# Patient Record
Sex: FEMALE | Race: WHITE | NOT HISPANIC OR LATINO
[De-identification: names, ages, dates, MRNs, and addresses within clinical notes are randomized per-mention and may not be internally consistent; named-entity substitution may affect disease eponyms.]

---

## 2019-10-03 PROBLEM — Z00.00 ENCOUNTER FOR PREVENTIVE HEALTH EXAMINATION: Status: ACTIVE | Noted: 2019-10-03

## 2019-10-09 PROBLEM — M54.16 LUMBAR RADICULOPATHY: Status: RESOLVED | Noted: 2019-10-09 | Resolved: 2019-10-09

## 2019-10-09 PROBLEM — M32.9 HISTORY OF SYSTEMIC LUPUS ERYTHEMATOSUS (SLE): Status: RESOLVED | Noted: 2019-10-09 | Resolved: 2019-10-09

## 2019-10-09 PROBLEM — Z87.891 FORMER SMOKER: Status: ACTIVE | Noted: 2019-10-09

## 2019-10-09 PROBLEM — Z71.9 ENCOUNTER FOR CONSULTATION: Status: ACTIVE | Noted: 2019-10-09

## 2019-10-09 PROBLEM — Z87.39 HISTORY OF SPINAL STENOSIS: Status: RESOLVED | Noted: 2019-10-09 | Resolved: 2019-10-09

## 2019-10-09 PROBLEM — Z86.39 HISTORY OF HYPOTHYROIDISM: Status: RESOLVED | Noted: 2019-10-09 | Resolved: 2019-10-09

## 2019-10-09 RX ORDER — FENTANYL 75 UG/H
75 PATCH, EXTENDED RELEASE TRANSDERMAL
Refills: 0 | Status: ACTIVE | COMMUNITY

## 2019-10-09 RX ORDER — MELOXICAM 15 MG/1
15 TABLET ORAL
Refills: 0 | Status: ACTIVE | COMMUNITY

## 2019-10-09 RX ORDER — POTASSIUM CHLORIDE 1500 MG/1
20 TABLET, FILM COATED, EXTENDED RELEASE ORAL
Refills: 0 | Status: ACTIVE | COMMUNITY

## 2019-10-09 RX ORDER — GABAPENTIN 300 MG/1
300 CAPSULE ORAL
Refills: 0 | Status: ACTIVE | COMMUNITY

## 2019-10-09 RX ORDER — ATORVASTATIN CALCIUM 40 MG/1
40 TABLET, FILM COATED ORAL
Refills: 0 | Status: ACTIVE | COMMUNITY

## 2019-10-09 RX ORDER — CYCLOBENZAPRINE HYDROCHLORIDE 10 MG/1
10 TABLET, FILM COATED ORAL
Refills: 0 | Status: ACTIVE | COMMUNITY

## 2019-10-09 RX ORDER — FUROSEMIDE 20 MG/1
20 TABLET ORAL
Refills: 0 | Status: ACTIVE | COMMUNITY

## 2019-10-09 RX ORDER — PSYLLIUM HUSK 0.4 G
25 MCG CAPSULE ORAL
Refills: 0 | Status: ACTIVE | COMMUNITY

## 2019-10-09 RX ORDER — VERAPAMIL HYDROCHLORIDE 240 MG/1
240 TABLET ORAL
Refills: 0 | Status: ACTIVE | COMMUNITY

## 2019-10-09 RX ORDER — DULOXETINE HYDROCHLORIDE 60 MG/1
60 CAPSULE, DELAYED RELEASE PELLETS ORAL
Refills: 0 | Status: ACTIVE | COMMUNITY

## 2019-10-09 RX ORDER — LEVOTHYROXINE SODIUM 0.15 MG/1
150 TABLET ORAL
Refills: 0 | Status: ACTIVE | COMMUNITY

## 2019-10-09 RX ORDER — LOSARTAN POTASSIUM 100 MG/1
100 TABLET, FILM COATED ORAL
Refills: 0 | Status: ACTIVE | COMMUNITY

## 2019-10-09 RX ORDER — HYDRALAZINE HYDROCHLORIDE 100 MG/1
100 TABLET ORAL
Refills: 0 | Status: ACTIVE | COMMUNITY

## 2019-10-10 ENCOUNTER — APPOINTMENT (OUTPATIENT)
Dept: CARDIOTHORACIC SURGERY | Facility: CLINIC | Age: 69
End: 2019-10-10
Payer: MEDICARE

## 2019-10-10 VITALS
TEMPERATURE: 97.9 F | SYSTOLIC BLOOD PRESSURE: 120 MMHG | DIASTOLIC BLOOD PRESSURE: 84 MMHG | OXYGEN SATURATION: 99 % | HEART RATE: 60 BPM | RESPIRATION RATE: 12 BRPM

## 2019-10-10 DIAGNOSIS — Z87.39 PERSONAL HISTORY OF OTHER DISEASES OF THE MUSCULOSKELETAL SYSTEM AND CONNECTIVE TISSUE: ICD-10-CM

## 2019-10-10 DIAGNOSIS — M32.9 SYSTEMIC LUPUS ERYTHEMATOSUS, UNSPECIFIED: ICD-10-CM

## 2019-10-10 DIAGNOSIS — M54.16 RADICULOPATHY, LUMBAR REGION: ICD-10-CM

## 2019-10-10 DIAGNOSIS — Z78.9 OTHER SPECIFIED HEALTH STATUS: ICD-10-CM

## 2019-10-10 DIAGNOSIS — Z87.891 PERSONAL HISTORY OF NICOTINE DEPENDENCE: ICD-10-CM

## 2019-10-10 DIAGNOSIS — Z71.9 COUNSELING, UNSPECIFIED: ICD-10-CM

## 2019-10-10 DIAGNOSIS — Z86.39 PERSONAL HISTORY OF OTHER ENDOCRINE, NUTRITIONAL AND METABOLIC DISEASE: ICD-10-CM

## 2019-10-10 PROCEDURE — 99204 OFFICE O/P NEW MOD 45 MIN: CPT

## 2019-10-10 RX ORDER — VERAPAMIL HYDROCHLORIDE 240 MG/1
240 CAPSULE, DELAYED RELEASE PELLETS ORAL
Qty: 90 | Refills: 0 | Status: DISCONTINUED | COMMUNITY
Start: 2019-07-15

## 2019-10-11 ENCOUNTER — APPOINTMENT (OUTPATIENT)
Dept: CARDIOTHORACIC SURGERY | Facility: CLINIC | Age: 69
End: 2019-10-11

## 2019-10-11 VITALS — WEIGHT: 198 LBS | HEIGHT: 66 IN | BODY MASS INDEX: 31.82 KG/M2

## 2019-10-11 NOTE — HISTORY OF PRESENT ILLNESS
[FreeTextEntry1] : Gia is a 69 year old female former smoker with PMH of spinal stenosis, osteoarthritis in right knee and bilateral hips, HTN, HLD, hypothyroidism, RA, SLE, moderate AS and dilatation of ascending aorta (5.1 cm) on recent imaging. Recent TTE on 9/24/2019 demonstrated normal global and regional LV systolic function, EF 65%, aortic valve is calcified, moderate AS, mGr 32.5 mmHg, pGr 53.9 mmHg, AoV 3.7 m/s, mild MR, mild TR and mild pulmonary HTN. She was referred for surgical consultation. She denies angina, palpitations, back pain, dizziness or syncope. She reports GONZALEZ and describes "I feel like I cant get enough air in my lung." She also reports pedal edema and she titrates her Lasix dose accordingly.

## 2019-10-11 NOTE — PHYSICAL EXAM
[General Appearance - Alert] : alert [PERRL With Normal Accommodation] : pupils were equal in size, round, and reactive to light [Sclera] : the sclera and conjunctiva were normal [General Appearance - In No Acute Distress] : in no acute distress [Extraocular Movements] : extraocular movements were intact [Jugular Venous Distention Increased] : there was no jugular-venous distention [Outer Ear] : the ears and nose were normal in appearance [] : no respiratory distress [Respiration, Rhythm And Depth] : normal respiratory rhythm and effort [Chest Visual Inspection Thoracic Asymmetry] : no chest asymmetry [Examination Of The Chest] : the chest was normal in appearance [Heart Rate And Rhythm] : heart rate was normal and rhythm regular [Bowel Sounds] : normal bowel sounds [Breast Appearance] : normal in appearance [Breast Palpation Mass] : no palpable masses [Abdomen Soft] : soft [Cervical Lymph Nodes Enlarged Posterior Bilaterally] : posterior cervical [No CVA Tenderness] : no ~M costovertebral angle tenderness [Cervical Lymph Nodes Enlarged Anterior Bilaterally] : anterior cervical [Skin Turgor] : normal skin turgor [Involuntary Movements] : no involuntary movements were seen [Skin Color & Pigmentation] : normal skin color and pigmentation [Oriented To Time, Place, And Person] : oriented to person, place, and time [Impaired Insight] : insight and judgment were intact [No Focal Deficits] : no focal deficits [FreeTextEntry1] : deferred

## 2019-10-11 NOTE — REVIEW OF SYSTEMS
[Feeling Tired] : feeling tired [Negative] : ENT [Chest Pain] : no chest pain [Palpitations] : no palpitations

## 2019-10-11 NOTE — CONSULT LETTER
[Dear  ___] : Dear ~YOLIE, [Please see my note below.] : Please see my note below. [Courtesy Letter:] : I had the pleasure of seeing your patient, [unfilled], in my office today. [Consult Closing:] : Thank you very much for allowing me to participate in the care of this patient.  If you have any questions, please do not hesitate to contact me. [Sincerely,] : Sincerely, [FreeTextEntry2] : Dr. Chetan Montana\par [address]\par [place] [id number] [FreeTextEntry3] : Werner Alvares MD\par  & \par \par Cardiovascular & Thoracic Surgery\par [facility] \par [address]\par [facility] [address]\par

## 2019-10-11 NOTE — REASON FOR VISIT
[Initial Evaluation] : an initial evaluation [FreeTextEntry1] : moderate to severe AS ascending aortic dilatation

## 2019-10-11 NOTE — DATA REVIEWED
[FreeTextEntry1] : TTE on 9/24/2019 demonstrated\par Normal global and regional LV systolic function, EF 65%\par Aortic valve is calcified, Moderate AS, mGr 32.5mmHg, pGr 53.9 mmHg, AoV 3.7 m/s\par mild MR, mild TR\par mild pulmonary HTN \par \par CTA of chest  9/27/2019 demonstrated\par dilatation of the ascending aorta with a max. diameter of 5.1 cm\par Aortic root arch and descending thoracic aorta are normal in size.\par Normal aortic arch branch vessels are present

## 2019-10-11 NOTE — ASSESSMENT
[FreeTextEntry1] : Gia is a 69 year old female with PMH of HTN, HLD, hypothyroidism, RA, SLE, moderate AS and dilatation of ascending aorta (5.1 cm) on recent imaging. She was referred for surgical consultation. \par \par Cardiac imaging, medical records and reports reviewed at length with patient. Recent transthoracic echocardiogram demonstrated normal global and regional LV systolic function, EF 65%, aortic valve is calcified, moderate AS, mGr 32.5 mmHg, pGr 53.9 mmHg, AoV 3.7 m/s, mild MR, mild TR and mild pulmonary HTN.  CTA of chest demonstrated 5 cm ascending aortic aneurysm. Risks, benefits and alternatives to surgery discussed. Risks included but not limited to bleeding, stroke, myocardial Infarction, kidney problems, blood transfusion, permanent pacemaker implantation, infections and death. Operative mortality and complication risks are low. Various approaches discussed in detail. I would recommend an aortic valve replacement and ascending aorta replacement.  All questions have been fully answered and concerns addressed. Patient would like to proceed with surgery.\par \par Plan:\par 1) Cardiac Catherization (RIGHT AND LEFT)\par 2) Complete antibiotic therapy as per \par 3) Complete dental work before proceeding with surgery \par

## 2019-11-05 ENCOUNTER — OUTPATIENT (OUTPATIENT)
Dept: OUTPATIENT SERVICES | Facility: HOSPITAL | Age: 69
LOS: 1 days | End: 2019-11-05
Payer: MEDICARE

## 2019-11-05 VITALS
SYSTOLIC BLOOD PRESSURE: 169 MMHG | DIASTOLIC BLOOD PRESSURE: 86 MMHG | RESPIRATION RATE: 17 BRPM | WEIGHT: 190.92 LBS | TEMPERATURE: 98 F | HEART RATE: 63 BPM | OXYGEN SATURATION: 96 % | HEIGHT: 70 IN

## 2019-11-05 DIAGNOSIS — I35.0 NONRHEUMATIC AORTIC (VALVE) STENOSIS: ICD-10-CM

## 2019-11-05 DIAGNOSIS — I77.810 THORACIC AORTIC ECTASIA: ICD-10-CM

## 2019-11-05 DIAGNOSIS — Z98.49 CATARACT EXTRACTION STATUS, UNSPECIFIED EYE: Chronic | ICD-10-CM

## 2019-11-05 DIAGNOSIS — Z98.890 OTHER SPECIFIED POSTPROCEDURAL STATES: Chronic | ICD-10-CM

## 2019-11-05 DIAGNOSIS — Z90.710 ACQUIRED ABSENCE OF BOTH CERVIX AND UTERUS: Chronic | ICD-10-CM

## 2019-11-05 DIAGNOSIS — Z98.51 TUBAL LIGATION STATUS: Chronic | ICD-10-CM

## 2019-11-05 DIAGNOSIS — Z98.891 HISTORY OF UTERINE SCAR FROM PREVIOUS SURGERY: Chronic | ICD-10-CM

## 2019-11-05 LAB
ALBUMIN SERPL ELPH-MCNC: 4.4 G/DL — SIGNIFICANT CHANGE UP (ref 3.3–5)
ALP SERPL-CCNC: 81 U/L — SIGNIFICANT CHANGE UP (ref 40–120)
ALT FLD-CCNC: 23 U/L — SIGNIFICANT CHANGE UP (ref 10–45)
ANION GAP SERPL CALC-SCNC: 10 MMOL/L — SIGNIFICANT CHANGE UP (ref 5–17)
AST SERPL-CCNC: 81 U/L — HIGH (ref 10–40)
BILIRUB SERPL-MCNC: 0.4 MG/DL — SIGNIFICANT CHANGE UP (ref 0.2–1.2)
BUN SERPL-MCNC: 13 MG/DL — SIGNIFICANT CHANGE UP (ref 7–23)
CALCIUM SERPL-MCNC: 9.5 MG/DL — SIGNIFICANT CHANGE UP (ref 8.4–10.5)
CHLORIDE SERPL-SCNC: 102 MMOL/L — SIGNIFICANT CHANGE UP (ref 96–108)
CO2 SERPL-SCNC: 23 MMOL/L — SIGNIFICANT CHANGE UP (ref 22–31)
CREAT SERPL-MCNC: 0.61 MG/DL — SIGNIFICANT CHANGE UP (ref 0.5–1.3)
GLUCOSE SERPL-MCNC: 101 MG/DL — HIGH (ref 70–99)
HCT VFR BLD CALC: 39.4 % — SIGNIFICANT CHANGE UP (ref 34.5–45)
HCT VFR BLD CALC: 40.2 % — SIGNIFICANT CHANGE UP (ref 34.5–45)
HGB BLD-MCNC: 13 G/DL — SIGNIFICANT CHANGE UP (ref 11.5–15.5)
HGB BLD-MCNC: 13.5 G/DL — SIGNIFICANT CHANGE UP (ref 11.5–15.5)
MCHC RBC-ENTMCNC: 31.2 PG — SIGNIFICANT CHANGE UP (ref 27–34)
MCHC RBC-ENTMCNC: 31.8 PG — SIGNIFICANT CHANGE UP (ref 27–34)
MCHC RBC-ENTMCNC: 33 GM/DL — SIGNIFICANT CHANGE UP (ref 32–36)
MCHC RBC-ENTMCNC: 33.6 GM/DL — SIGNIFICANT CHANGE UP (ref 32–36)
MCV RBC AUTO: 94.5 FL — SIGNIFICANT CHANGE UP (ref 80–100)
MCV RBC AUTO: 94.6 FL — SIGNIFICANT CHANGE UP (ref 80–100)
NRBC # BLD: 0 /100 WBCS — SIGNIFICANT CHANGE UP (ref 0–0)
NRBC # BLD: 0 /100 WBCS — SIGNIFICANT CHANGE UP (ref 0–0)
PLATELET # BLD AUTO: 277 K/UL — SIGNIFICANT CHANGE UP (ref 150–400)
PLATELET # BLD AUTO: 288 K/UL — SIGNIFICANT CHANGE UP (ref 150–400)
POTASSIUM SERPL-MCNC: 3.7 MMOL/L — SIGNIFICANT CHANGE UP (ref 3.5–5.3)
POTASSIUM SERPL-MCNC: 7.6 MMOL/L — CRITICAL HIGH (ref 3.5–5.3)
POTASSIUM SERPL-SCNC: 3.7 MMOL/L — SIGNIFICANT CHANGE UP (ref 3.5–5.3)
POTASSIUM SERPL-SCNC: 7.6 MMOL/L — CRITICAL HIGH (ref 3.5–5.3)
PROT SERPL-MCNC: 8.1 G/DL — SIGNIFICANT CHANGE UP (ref 6–8.3)
RBC # BLD: 4.17 M/UL — SIGNIFICANT CHANGE UP (ref 3.8–5.2)
RBC # BLD: 4.25 M/UL — SIGNIFICANT CHANGE UP (ref 3.8–5.2)
RBC # FLD: 12.8 % — SIGNIFICANT CHANGE UP (ref 10.3–14.5)
RBC # FLD: 12.8 % — SIGNIFICANT CHANGE UP (ref 10.3–14.5)
SODIUM SERPL-SCNC: 135 MMOL/L — SIGNIFICANT CHANGE UP (ref 135–145)
WBC # BLD: 7.4 K/UL — SIGNIFICANT CHANGE UP (ref 3.8–10.5)
WBC # BLD: 8.08 K/UL — SIGNIFICANT CHANGE UP (ref 3.8–10.5)
WBC # FLD AUTO: 7.4 K/UL — SIGNIFICANT CHANGE UP (ref 3.8–10.5)
WBC # FLD AUTO: 8.08 K/UL — SIGNIFICANT CHANGE UP (ref 3.8–10.5)

## 2019-11-05 PROCEDURE — 84132 ASSAY OF SERUM POTASSIUM: CPT

## 2019-11-05 PROCEDURE — C1894: CPT

## 2019-11-05 PROCEDURE — C1769: CPT

## 2019-11-05 PROCEDURE — 80053 COMPREHEN METABOLIC PANEL: CPT

## 2019-11-05 PROCEDURE — 93010 ELECTROCARDIOGRAM REPORT: CPT

## 2019-11-05 PROCEDURE — 99152 MOD SED SAME PHYS/QHP 5/>YRS: CPT

## 2019-11-05 PROCEDURE — C1887: CPT

## 2019-11-05 PROCEDURE — 93456 R HRT CORONARY ARTERY ANGIO: CPT

## 2019-11-05 PROCEDURE — 93456 R HRT CORONARY ARTERY ANGIO: CPT | Mod: 26

## 2019-11-05 PROCEDURE — 93005 ELECTROCARDIOGRAM TRACING: CPT

## 2019-11-05 PROCEDURE — 85027 COMPLETE CBC AUTOMATED: CPT

## 2019-11-05 RX ORDER — SODIUM CHLORIDE 9 MG/ML
3 INJECTION INTRAMUSCULAR; INTRAVENOUS; SUBCUTANEOUS EVERY 8 HOURS
Refills: 0 | Status: DISCONTINUED | OUTPATIENT
Start: 2019-11-05 | End: 2019-11-22

## 2019-11-05 RX ORDER — HYDRALAZINE HCL 50 MG
100 TABLET ORAL ONCE
Refills: 0 | Status: DISCONTINUED | OUTPATIENT
Start: 2019-11-05 | End: 2019-11-22

## 2019-11-05 RX ORDER — OXYCODONE AND ACETAMINOPHEN 5; 325 MG/1; MG/1
2 TABLET ORAL ONCE
Refills: 0 | Status: COMPLETED | OUTPATIENT
Start: 2019-11-05 | End: 2019-11-05

## 2019-11-05 NOTE — H&P CARDIOLOGY - FAMILY HISTORY
Mother  Still living? No  Family history of pancreatic cancer, Age at diagnosis: Age Unknown     Grandparent  Still living? No  Family history of stomach cancer, Age at diagnosis: Age Unknown  Family history of aneurysm, Age at diagnosis: Age Unknown     Father  Still living? No  Family history of aneurysm, Age at diagnosis: Age Unknown

## 2019-11-05 NOTE — H&P CARDIOLOGY - PMH
Bipolar depression    Cataract    Fibromyalgia    Hip dislocation, left  avascular necrosis  HLD (hyperlipidemia)    HTN (hypertension)    Hypothyroid    Spinal stenosis of lumbar region    Uterine cancer

## 2019-11-05 NOTE — H&P CARDIOLOGY - HISTORY OF PRESENT ILLNESS
68 yo no implantable device with PMHx of HTN, HLD, spinal stenosis, bipolar depression, fibromyalgia, former smoker, hypothyroidism, avascular hip necrosis with left hip dislocation presents for cardiac cath. pt reports she has been feeling SOB, dry cough, difficulty of swallowing and hoarseness for few years, and had recent dental abscess which lead to further investigation found to have Aortic aneurysm.   cardiologist Dr. Cristobal ([facility]) 68 yo no implantable device with PMHx of HTN, HLD, spinal stenosis, bipolar depression, fibromyalgia, former smoker, hypothyroidism, osteoarthritis with right knee and bilateral hips (avascular hip necrosis with left hip dislocation), RA, SLE, heart murmur (mod AS) presents for cardiac cath. pt reports she has been feeling SOB, dry cough, difficulty of swallowing and hoarseness for few years, Echo showed moderate AS and dilatation of ascending aorta (5.1 cm) on recent imaging. Recent TTE on 9/24/2019 showed EF 65%, aortic valve is calcified, moderate AS, mGr 32.5 mmHg, pGr 53.9 mmHg, AoV 3.7 m/s, mild MR, mild TR and mild pulmonary HTN. She was referred for surgical consultation. She denies angina, palpitations, back pain, dizziness or syncope. She reports GONZALEZ.     cardiologist Dr. Cristobal ([place])

## 2019-11-05 NOTE — CHART NOTE - NSCHARTNOTEFT_GEN_A_CORE
Called by pt's spouse "bleeding."   Rt Groin bleeding noted after pt stood up pulled her pants.   5x5 cm sized hematoma on the puncture site manually compressed over 20 minutes.   VSS, no c/o dizziness or light headed.   CBC stable, no further hematoma noted.   Plan BR another hour, monitor   Slowly ambulate pt and discharge if stable. Called by pt's spouse "bleeding."   Rt Groin bleeding noted after pt stood up pulled her pants.   5x5 cm sized hematoma on the puncture site manually compressed over 20 minutes.   VSS, no c/o dizziness or light headed.   CBC stable, no further hematoma noted.   Plan BR another hour, monitor   Slowly ambulate pt and discharge if stable.      2 hours after above event, pt's c/o pain on right groin, site mild tender.   Recompressed again for 10-15 minutes.   /90, Hydralazine 100mg po & Ativan 2mg po (pt's home mid day dose) are given.    1720 pt is slowly sitting up in bed, VSS. site mild sore, no bleeding or hematoma. Called by pt's spouse "bleeding."   Rt Groin bleeding noted after pt stood up pulled her pants.   5x5 cm sized hematoma on the puncture site manually compressed over 20 minutes.   VSS, no c/o dizziness or light headed.   CBC stable, no further hematoma noted.   Plan BR another hour, monitor   Slowly ambulate pt and discharge if stable.      15:30   2 hours after above event, pt's c/o pain on right groin, site mild tender.   Recompressed again for 10-15 minutes.   /90, Hydralazine 100mg po & Ativan 2mg po (pt's home mid day dose) are given.    17:20   pt is slowly sitting up in bed, VSS. site mild sore, no bleeding or hematoma.

## 2019-11-05 NOTE — H&P CARDIOLOGY - PSH
H/O total hysterectomy    H/O tubal ligation    H/O:   x2  History of cataract surgery    History of hip surgery

## 2019-12-04 PROBLEM — S73.005A UNSPECIFIED DISLOCATION OF LEFT HIP, INITIAL ENCOUNTER: Chronic | Status: ACTIVE | Noted: 2019-11-05

## 2019-12-04 PROBLEM — I10 ESSENTIAL (PRIMARY) HYPERTENSION: Chronic | Status: ACTIVE | Noted: 2019-11-05

## 2019-12-04 PROBLEM — E03.9 HYPOTHYROIDISM, UNSPECIFIED: Chronic | Status: ACTIVE | Noted: 2019-11-05

## 2019-12-04 PROBLEM — F31.9 BIPOLAR DISORDER, UNSPECIFIED: Chronic | Status: ACTIVE | Noted: 2019-11-05

## 2019-12-04 PROBLEM — M48.061 SPINAL STENOSIS, LUMBAR REGION WITHOUT NEUROGENIC CLAUDICATION: Chronic | Status: ACTIVE | Noted: 2019-11-05

## 2019-12-04 PROBLEM — E78.5 HYPERLIPIDEMIA, UNSPECIFIED: Chronic | Status: ACTIVE | Noted: 2019-11-05

## 2019-12-04 PROBLEM — C55 MALIGNANT NEOPLASM OF UTERUS, PART UNSPECIFIED: Chronic | Status: ACTIVE | Noted: 2019-11-05

## 2019-12-04 PROBLEM — M79.7 FIBROMYALGIA: Chronic | Status: ACTIVE | Noted: 2019-11-05

## 2019-12-04 PROBLEM — H26.9 UNSPECIFIED CATARACT: Chronic | Status: ACTIVE | Noted: 2019-11-05

## 2020-01-15 PROBLEM — I77.810 ASCENDING AORTA DILATION: Status: ACTIVE | Noted: 2019-10-10

## 2020-01-15 PROBLEM — I35.0 AORTIC STENOSIS: Status: ACTIVE | Noted: 2019-10-09

## 2020-01-16 ENCOUNTER — APPOINTMENT (OUTPATIENT)
Dept: CARDIOTHORACIC SURGERY | Facility: CLINIC | Age: 70
End: 2020-01-16

## 2020-01-16 DIAGNOSIS — I77.810 THORACIC AORTIC ECTASIA: ICD-10-CM

## 2020-01-16 DIAGNOSIS — I35.0 NONRHEUMATIC AORTIC (VALVE) STENOSIS: ICD-10-CM

## 2020-01-21 ENCOUNTER — APPOINTMENT (OUTPATIENT)
Dept: CARDIOTHORACIC SURGERY | Facility: CLINIC | Age: 70
End: 2020-01-21

## 2020-01-23 VITALS — BODY MASS INDEX: 31.66 KG/M2 | WEIGHT: 197 LBS | HEIGHT: 66 IN

## 2020-01-23 VITALS — RESPIRATION RATE: 12 BRPM | SYSTOLIC BLOOD PRESSURE: 120 MMHG | TEMPERATURE: 98 F | DIASTOLIC BLOOD PRESSURE: 68 MMHG

## 2020-01-23 VITALS — OXYGEN SATURATION: 96 % | HEART RATE: 64 BPM | RESPIRATION RATE: 12 BRPM

## 2020-01-23 RX ORDER — LAMOTRIGINE 100 MG/1
100 TABLET ORAL DAILY
Refills: 0 | Status: ACTIVE | COMMUNITY

## 2020-01-23 RX ORDER — PROCHLORPERAZINE MALEATE 10 MG/1
10 TABLET ORAL DAILY
Refills: 0 | Status: ACTIVE | COMMUNITY

## 2020-01-23 RX ORDER — AMOXICILLIN AND CLAVULANATE POTASSIUM 875; 125 MG/1; MG/1
875-125 TABLET, COATED ORAL
Qty: 20 | Refills: 0 | Status: COMPLETED | COMMUNITY
Start: 2019-09-21 | End: 2020-01-23

## 2020-01-23 RX ORDER — LORAZEPAM 2 MG/1
2 TABLET ORAL 3 TIMES DAILY
Refills: 0 | Status: ACTIVE | COMMUNITY

## 2020-01-27 ENCOUNTER — APPOINTMENT (OUTPATIENT)
Dept: CARDIOTHORACIC SURGERY | Facility: CLINIC | Age: 70
End: 2020-01-27

## 2020-02-06 NOTE — HISTORY OF PRESENT ILLNESS
[FreeTextEntry1] : Gia is a 69 year old female former smoker with PMH of spinal stenosis, osteoarthritis in right knee and bilateral hips, HTN, HLD, hypothyroidism, RA, SLE, moderate AS and dilatation of ascending aorta (5.1 cm) on recent imaging. Recent TTE on 9/24/2019 demonstrated normal global and regional LV systolic function, EF 65%, aortic valve is calcified, moderate AS, mGr 32.5 mmHg, pGr 53.9 mmHg, AoV 3.7 m/s, mild MR, mild TR and mild pulmonary HTN. She was referred for surgical consultation. She denies angina, palpitations, back pain, dizziness or syncope. She reports GONZALEZ and describes "I feel like I cant get enough air in my lung." She also reports pedal edema and she titrates her Lasix dose accordingly. \par \par Surgery (AVR/ascending aortic replacement) booked for 12/5/2019 but postponed due to recent hip fracture. She returns today for followup and further discussion of surgical plan.

## 2020-02-06 NOTE — CONSULT LETTER
[Dear  ___] : Dear ~YOLIE, [Courtesy Letter:] : I had the pleasure of seeing your patient, [unfilled], in my office today. [Please see my note below.] : Please see my note below. [Consult Closing:] : Thank you very much for allowing me to participate in the care of this patient.  If you have any questions, please do not hesitate to contact me. [Sincerely,] : Sincerely, [FreeTextEntry2] : Dr. Chetan Montana\par [address]\par [place] [phone] [FreeTextEntry3] : Werner Alvares MD\par  & \par \par Cardiovascular & Thoracic Surgery\par [facility] \par [address]\par [facility] [address]\par

## 2020-02-12 ENCOUNTER — APPOINTMENT (OUTPATIENT)
Dept: CARDIOTHORACIC SURGERY | Facility: CLINIC | Age: 70
End: 2020-02-12

## 2020-02-13 ENCOUNTER — OUTPATIENT (OUTPATIENT)
Dept: OUTPATIENT SERVICES | Facility: HOSPITAL | Age: 70
LOS: 1 days | End: 2020-02-13
Payer: MEDICARE

## 2020-02-13 VITALS
RESPIRATION RATE: 16 BRPM | HEIGHT: 65.5 IN | SYSTOLIC BLOOD PRESSURE: 156 MMHG | OXYGEN SATURATION: 96 % | DIASTOLIC BLOOD PRESSURE: 86 MMHG | WEIGHT: 197.09 LBS | TEMPERATURE: 98 F | HEART RATE: 57 BPM

## 2020-02-13 DIAGNOSIS — F31.9 BIPOLAR DISORDER, UNSPECIFIED: ICD-10-CM

## 2020-02-13 DIAGNOSIS — Z98.890 OTHER SPECIFIED POSTPROCEDURAL STATES: Chronic | ICD-10-CM

## 2020-02-13 DIAGNOSIS — Z98.49 CATARACT EXTRACTION STATUS, UNSPECIFIED EYE: Chronic | ICD-10-CM

## 2020-02-13 DIAGNOSIS — R52 PAIN, UNSPECIFIED: ICD-10-CM

## 2020-02-13 DIAGNOSIS — I35.0 NONRHEUMATIC AORTIC (VALVE) STENOSIS: ICD-10-CM

## 2020-02-13 DIAGNOSIS — Z98.51 TUBAL LIGATION STATUS: Chronic | ICD-10-CM

## 2020-02-13 DIAGNOSIS — Z01.818 ENCOUNTER FOR OTHER PREPROCEDURAL EXAMINATION: ICD-10-CM

## 2020-02-13 DIAGNOSIS — I71.2 THORACIC AORTIC ANEURYSM, WITHOUT RUPTURE: ICD-10-CM

## 2020-02-13 DIAGNOSIS — I10 ESSENTIAL (PRIMARY) HYPERTENSION: ICD-10-CM

## 2020-02-13 DIAGNOSIS — Z29.9 ENCOUNTER FOR PROPHYLACTIC MEASURES, UNSPECIFIED: ICD-10-CM

## 2020-02-13 DIAGNOSIS — Z98.891 HISTORY OF UTERINE SCAR FROM PREVIOUS SURGERY: Chronic | ICD-10-CM

## 2020-02-13 DIAGNOSIS — Z90.710 ACQUIRED ABSENCE OF BOTH CERVIX AND UTERUS: Chronic | ICD-10-CM

## 2020-02-13 LAB
ANION GAP SERPL CALC-SCNC: 14 MMOL/L — SIGNIFICANT CHANGE UP (ref 5–17)
BLD GP AB SCN SERPL QL: NEGATIVE — SIGNIFICANT CHANGE UP
BUN SERPL-MCNC: 18 MG/DL — SIGNIFICANT CHANGE UP (ref 7–23)
CALCIUM SERPL-MCNC: 9.5 MG/DL — SIGNIFICANT CHANGE UP (ref 8.4–10.5)
CHLORIDE SERPL-SCNC: 104 MMOL/L — SIGNIFICANT CHANGE UP (ref 96–108)
CO2 SERPL-SCNC: 23 MMOL/L — SIGNIFICANT CHANGE UP (ref 22–31)
CREAT SERPL-MCNC: 0.66 MG/DL — SIGNIFICANT CHANGE UP (ref 0.5–1.3)
GLUCOSE SERPL-MCNC: 106 MG/DL — HIGH (ref 70–99)
HCT VFR BLD CALC: 39.5 % — SIGNIFICANT CHANGE UP (ref 34.5–45)
HGB BLD-MCNC: 12.5 G/DL — SIGNIFICANT CHANGE UP (ref 11.5–15.5)
MCHC RBC-ENTMCNC: 31.6 GM/DL — LOW (ref 32–36)
MCHC RBC-ENTMCNC: 31.8 PG — SIGNIFICANT CHANGE UP (ref 27–34)
MCV RBC AUTO: 100.5 FL — HIGH (ref 80–100)
NRBC # BLD: 0 /100 WBCS — SIGNIFICANT CHANGE UP (ref 0–0)
PLATELET # BLD AUTO: 278 K/UL — SIGNIFICANT CHANGE UP (ref 150–400)
POTASSIUM SERPL-MCNC: 4 MMOL/L — SIGNIFICANT CHANGE UP (ref 3.5–5.3)
POTASSIUM SERPL-SCNC: 4 MMOL/L — SIGNIFICANT CHANGE UP (ref 3.5–5.3)
RBC # BLD: 3.93 M/UL — SIGNIFICANT CHANGE UP (ref 3.8–5.2)
RBC # FLD: 14 % — SIGNIFICANT CHANGE UP (ref 10.3–14.5)
RH IG SCN BLD-IMP: POSITIVE — SIGNIFICANT CHANGE UP
SODIUM SERPL-SCNC: 141 MMOL/L — SIGNIFICANT CHANGE UP (ref 135–145)
WBC # BLD: 6.61 K/UL — SIGNIFICANT CHANGE UP (ref 3.8–10.5)
WBC # FLD AUTO: 6.61 K/UL — SIGNIFICANT CHANGE UP (ref 3.8–10.5)

## 2020-02-13 PROCEDURE — 71046 X-RAY EXAM CHEST 2 VIEWS: CPT | Mod: 26

## 2020-02-13 PROCEDURE — 87640 STAPH A DNA AMP PROBE: CPT

## 2020-02-13 PROCEDURE — 86850 RBC ANTIBODY SCREEN: CPT

## 2020-02-13 PROCEDURE — 83036 HEMOGLOBIN GLYCOSYLATED A1C: CPT

## 2020-02-13 PROCEDURE — 87641 MR-STAPH DNA AMP PROBE: CPT

## 2020-02-13 PROCEDURE — 80048 BASIC METABOLIC PNL TOTAL CA: CPT

## 2020-02-13 PROCEDURE — 86900 BLOOD TYPING SEROLOGIC ABO: CPT

## 2020-02-13 PROCEDURE — 86901 BLOOD TYPING SEROLOGIC RH(D): CPT

## 2020-02-13 PROCEDURE — G0463: CPT

## 2020-02-13 PROCEDURE — 71046 X-RAY EXAM CHEST 2 VIEWS: CPT

## 2020-02-13 PROCEDURE — 85027 COMPLETE CBC AUTOMATED: CPT

## 2020-02-13 RX ORDER — OMEPRAZOLE 10 MG/1
1 CAPSULE, DELAYED RELEASE ORAL
Qty: 0 | Refills: 0 | DISCHARGE

## 2020-02-13 RX ORDER — SODIUM CHLORIDE 9 MG/ML
3 INJECTION INTRAMUSCULAR; INTRAVENOUS; SUBCUTANEOUS EVERY 8 HOURS
Refills: 0 | Status: DISCONTINUED | OUTPATIENT
Start: 2020-02-19 | End: 2020-03-01

## 2020-02-13 NOTE — H&P PST ADULT - NSICDXPASTMEDICALHX_GEN_ALL_CORE_FT
PAST MEDICAL HISTORY:  Bipolar depression     Cataract     Fibromyalgia     Hip dislocation, left avascular necrosis    HLD (hyperlipidemia)     HTN (hypertension)     Hypothyroid     Spinal stenosis of lumbar region     Subacute systemic lupus erythematosus     Uterine cancer PAST MEDICAL HISTORY:  Bipolar depression     Cataract     Fibromyalgia     H/O aortic aneurysm and thoracic aortic aneurysm    Hip dislocation, left avascular necrosis    HLD (hyperlipidemia)     HTN (hypertension)     Hypothyroid     Moderate aortic stenosis     Spinal stenosis of lumbar region     Subacute systemic lupus erythematosus     Uterine cancer

## 2020-02-13 NOTE — H&P PST ADULT - ACTIVITY
Cane, walker with spinal stenosis, wheelchair presently Presently on wheelchair due to fractured left hip

## 2020-02-13 NOTE — H&P PST ADULT - HISTORY OF PRESENT ILLNESS
68 yo no implantable device with PMHx of HTN, HLD, spinal stenosis, bipolar depression, fibromyalgia, former smoker, hypothyroidism, osteoarthritis with right knee and bilateral hips (avascular hip necrosis with left hip dislocation), RA, SLE, heart murmur (mod AS) presents for cardiac cath. pt reports she has been feeling SOB, dry cough, difficulty of swallowing and hoarseness for few years, Echo showed moderate AS and dilatation of ascending aorta (5.1 cm) on recent imaging. Recent TTE on 9/24/2019 showed EF 65%, aortic valve is calcified, moderate AS, mGr 32.5 mmHg, pGr 53.9 mmHg, AoV 3.7 m/s, mild MR, mild TR and mild pulmonary HTN. She was referred for surgical consultation. She denies angina, palpitations, back pain, dizziness or syncope. She reports GONZALEZ.     cardiologist Dr. Cristobal ([place]) 70 yo female, PMH HTN, HLD, bipolar disorder, fibromyalgia, hypothyroid, chronic pain due to OA bilateral knees and hips, SLE, RA, known heart murmur due to moderate AS (follows up with cardiology), incidental finding dilation of ascending aorta 5.1cm on CT scan and aortic/thoracic aneurysm, had pre-op cardiac cath 11/5/19 (normal coronaries), pt. fell on left hip and suffered fracture, and surgery postponed. Pt. presents to PST today for scheduled Aortic Valve Replacement, Ascending Aortic Replacement on 2/20/20. Pt. was on antibiotics for a cough and taking her last dose today - no further cough, denies fever, chills, ches pain, SOB, changes in bowel/urinary habits.    Pt. will have her Fentanyl patch DOS - pt. was told to tell anesthesiologist that she is wearing it and it will have to be removed right before going to OR (d/w Dr. Castillo) 68 yo female, PMH HTN, HLD, bipolar disorder, fibromyalgia, hypothyroid, chronic pain due to OA bilateral knees and hips, SLE, RA, known heart murmur due to moderate AS (follows up with cardiology), incidental finding dilation of ascending aorta 5.1cm on CT scan and aortic/thoracic aneurysm, had pre-op cardiac cath 11/5/19 (normal coronaries), pt. fell on left hip and suffered fracture, and surgery postponed. Pt. presents to PST today for scheduled Aortic Valve Replacement, Ascending Aortic Replacement on 2/20/20. Pt. was on antibiotics for a cough and taking her last dose today - no further cough, denies fever, chills, ches pain, SOB, changes in bowel/urinary habits.    Pt. will have her Fentanyl patch DOS - pt. was told to tell anesthesiologist that she is wearing it and it will have to be removed right before going to OR (d/w Dr. Castillo) Pain management consult requested

## 2020-02-13 NOTE — H&P PST ADULT - OTHER CARE PROVIDERS
Dr. De, cardiologist ; Dr. Ba, pt. bairon Dr. De, cardiologist ; Dr. Ba, pain management, [phone]

## 2020-02-13 NOTE — H&P PST ADULT - NSICDXPASTSURGICALHX_GEN_ALL_CORE_FT
PAST SURGICAL HISTORY:  H/O total hysterectomy     H/O tubal ligation     H/O:  x2    History of cataract surgery     History of hip surgery

## 2020-02-13 NOTE — H&P PST ADULT - DOES THIS PATIENT HAVE A HISTORY OF OR HAS BEEN DX WITH HEART FAILURE?
Chief Complaint:  Ashok Bean is here today for follow up visit from ER - Constipation.    Medications:  medications verified and updated  Refills needed today?  NO  denies Latex allergy or sensitivity  Tobacco History:  verified               yes

## 2020-02-13 NOTE — H&P PST ADULT - ASSESSMENT
ELVIAI VTE 2.0 SCORE [CLOT updated 2019]    AGE RELATED RISK FACTORS                                                       MOBILITY RELATED FACTORS  [ ] Age 41-60 years                                            (1 Point)                    [ ] Bed rest                                                        (1 Point)  [x ] Age: 61-74 years                                           (2 Points)                  [ ] Plaster cast                                                   (2 Points)  [ ] Age= 75 years                                              (3 Points)                    [ ] Bed bound for more than 72 hours                 (2 Points)    DISEASE RELATED RISK FACTORS                                               GENDER SPECIFIC FACTORS  [ x] Edema in the lower extremities                       (1 Point)              [ ] Pregnancy                                                     (1 Point)  [ ] Varicose veins                                               (1 Point)                     [ ] Post-partum < 6 weeks                                   (1 Point)             [ x] BMI > 25 Kg/m2                                            (1 Point)                     [ ] Hormonal therapy  or oral contraception          (1 Point)                 [ ] Sepsis (in the previous month)                        (1 Point)               [ ] History of pregnancy complications                 (1 point)  [ ] Pneumonia or serious lung disease                                               [ ] Unexplained or recurrent                     (1 Point)           (in the previous month)                               (1 Point)  [ ] Abnormal pulmonary function test                     (1 Point)                 SURGERY RELATED RISK FACTORS  [ ] Acute myocardial infarction                              (1 Point)               [ ]  Section                                             (1 Point)  [ ] Congestive heart failure (in the previous month)  (1 Point)      [ ] Minor surgery                                                  (1 Point)   [ ] Inflammatory bowel disease                             (1 Point)               [ ] Arthroscopic surgery                                        (2 Points)  [ ] Central venous access                                      (2 Points)                [x ] General surgery lasting more than 45 minutes (2 points)  [x ] Malignancy- Present or previous                   (2 Points)                [ ] Elective arthroplasty                                         (5 points)    [ ] Stroke (in the previous month)                          (5 Points)                                                                                                                                                           HEMATOLOGY RELATED FACTORS                                                 TRAUMA RELATED RISK FACTORS  [ ] Prior episodes of VTE                                     (3 Points)                [ ] Fracture of the hip, pelvis, or leg                       (5 Points)  [ ] Positive family history for VTE                         (3 Points)             [ ] Acute spinal cord injury (in the previous month)  (5 Points)  [ ] Prothrombin 33893 A                                     (3 Points)               [ ] Paralysis  (less than 1 month)                             (5 Points)  [ ] Factor V Leiden                                             (3 Points)                  [ ] Multiple Trauma within 1 month                        (5 Points)  [ ] Lupus anticoagulants                                     (3 Points)                                                           [ ] Anticardiolipin antibodies                               (3 Points)                                                       [ ] High homocysteine in the blood                      (3 Points)                                             [ ] Other congenital or acquired thrombophilia      (3 Points)                                                [ ] Heparin induced thrombocytopenia                  (3 Points)                                     Total Score [8          ]

## 2020-02-13 NOTE — H&P PST ADULT - NSANTHOSAYNRD_GEN_A_CORE
No. SAYDA screening performed.  STOP BANG Legend: 0-2 = LOW Risk; 3-4 = INTERMEDIATE Risk; 5-8 = HIGH Risk

## 2020-02-13 NOTE — H&P PST ADULT - NSICDXFAMILYHX_GEN_ALL_CORE_FT
FAMILY HISTORY:  Father  Still living? No  Family history of aneurysm, Age at diagnosis: Age Unknown    Mother  Still living? No  Family history of pancreatic cancer, Age at diagnosis: Age Unknown    Grandparent  Still living? No  Family history of aneurysm, Age at diagnosis: Age Unknown  Family history of stomach cancer, Age at diagnosis: Age Unknown

## 2020-02-13 NOTE — H&P PST ADULT - NSICDXPROBLEM_GEN_ALL_CORE_FT
PROBLEM DIAGNOSES  Problem: Aortic valve stenosis  Assessment and Plan: Aortic Valve Replacement, Ascending Aortic Replacement  Pre-op instructions, including Chlorhexidine soap, provided - all questions answered    Problem: Bipolar disorder  Assessment and Plan: Continue pshych meds as directed    Problem: Chronic generalized pain  Assessment and Plan: Continue pain medications and muscle relaxers as indicated    Problem: Hypertension  Assessment and Plan: continue BP/cardiac meds as directed, including am of surgery with sip of water    Problem: Prophylactic measure  Assessment and Plan: The Caprini score indicates that this patient is at high risk for a VTE event (score 6 or greater). Surgical patients in this group will benefit from both pharmacologic prophylaxis and intermittent compression devices.  The surgical team will determine the balance between VTE risk and bleeding risk, and other clinical considerations

## 2020-02-14 DIAGNOSIS — Z22.322 CARRIER OR SUSPECTED CARRIER OF METHICILLIN RESISTANT STAPHYLOCOCCUS AUREUS: ICD-10-CM

## 2020-02-14 LAB
HBA1C BLD-MCNC: 5.5 % — SIGNIFICANT CHANGE UP (ref 4–5.6)
MRSA PCR RESULT.: DETECTED
S AUREUS DNA NOSE QL NAA+PROBE: DETECTED

## 2020-02-14 RX ORDER — MUPIROCIN 20 MG/G
2 OINTMENT TOPICAL
Qty: 1 | Refills: 0 | Status: ACTIVE | COMMUNITY
Start: 2020-02-14 | End: 1900-01-01

## 2020-02-19 ENCOUNTER — APPOINTMENT (OUTPATIENT)
Dept: CARDIOTHORACIC SURGERY | Facility: HOSPITAL | Age: 70
End: 2020-02-19

## 2020-02-19 ENCOUNTER — RESULT REVIEW (OUTPATIENT)
Age: 70
End: 2020-02-19

## 2020-02-19 ENCOUNTER — INPATIENT (INPATIENT)
Facility: HOSPITAL | Age: 70
LOS: 10 days | Discharge: HOME | DRG: 219 | End: 2020-03-01
Attending: THORACIC SURGERY (CARDIOTHORACIC VASCULAR SURGERY) | Admitting: THORACIC SURGERY (CARDIOTHORACIC VASCULAR SURGERY)
Payer: MEDICARE

## 2020-02-19 VITALS
OXYGEN SATURATION: 94 % | HEART RATE: 60 BPM | TEMPERATURE: 98 F | SYSTOLIC BLOOD PRESSURE: 139 MMHG | WEIGHT: 197.09 LBS | RESPIRATION RATE: 16 BRPM | HEIGHT: 65 IN | DIASTOLIC BLOOD PRESSURE: 85 MMHG

## 2020-02-19 DIAGNOSIS — I35.0 NONRHEUMATIC AORTIC (VALVE) STENOSIS: ICD-10-CM

## 2020-02-19 DIAGNOSIS — Z90.710 ACQUIRED ABSENCE OF BOTH CERVIX AND UTERUS: Chronic | ICD-10-CM

## 2020-02-19 DIAGNOSIS — Z98.51 TUBAL LIGATION STATUS: Chronic | ICD-10-CM

## 2020-02-19 DIAGNOSIS — I71.2 THORACIC AORTIC ANEURYSM, WITHOUT RUPTURE: ICD-10-CM

## 2020-02-19 DIAGNOSIS — Z98.49 CATARACT EXTRACTION STATUS, UNSPECIFIED EYE: Chronic | ICD-10-CM

## 2020-02-19 DIAGNOSIS — Z98.890 OTHER SPECIFIED POSTPROCEDURAL STATES: Chronic | ICD-10-CM

## 2020-02-19 DIAGNOSIS — Z98.891 HISTORY OF UTERINE SCAR FROM PREVIOUS SURGERY: Chronic | ICD-10-CM

## 2020-02-19 PROBLEM — Z86.79 PERSONAL HISTORY OF OTHER DISEASES OF THE CIRCULATORY SYSTEM: Chronic | Status: ACTIVE | Noted: 2020-02-13

## 2020-02-19 PROBLEM — L93.0 DISCOID LUPUS ERYTHEMATOSUS: Chronic | Status: ACTIVE | Noted: 2020-02-13

## 2020-02-19 LAB
ALBUMIN SERPL ELPH-MCNC: 2.8 G/DL — LOW (ref 3.3–5)
ALP SERPL-CCNC: 34 U/L — LOW (ref 40–120)
ALT FLD-CCNC: 8 U/L — LOW (ref 10–45)
ANION GAP SERPL CALC-SCNC: 18 MMOL/L — HIGH (ref 5–17)
APTT BLD: 29.7 SEC — SIGNIFICANT CHANGE UP (ref 27.5–36.3)
AST SERPL-CCNC: 20 U/L — SIGNIFICANT CHANGE UP (ref 10–40)
BASE EXCESS BLDV CALC-SCNC: -1.2 MMOL/L — SIGNIFICANT CHANGE UP (ref -2–2)
BASE EXCESS BLDV CALC-SCNC: -2.4 MMOL/L — LOW (ref -2–2)
BASE EXCESS BLDV CALC-SCNC: 1.8 MMOL/L — SIGNIFICANT CHANGE UP (ref -2–2)
BASE EXCESS BLDV CALC-SCNC: 2 MMOL/L — SIGNIFICANT CHANGE UP (ref -2–2)
BASE EXCESS BLDV CALC-SCNC: 2.7 MMOL/L — HIGH (ref -2–2)
BASE EXCESS BLDV CALC-SCNC: 2.8 MMOL/L — HIGH (ref -2–2)
BASOPHILS # BLD AUTO: 0.03 K/UL — SIGNIFICANT CHANGE UP (ref 0–0.2)
BASOPHILS NFR BLD AUTO: 0.3 % — SIGNIFICANT CHANGE UP (ref 0–2)
BILIRUB SERPL-MCNC: 0.5 MG/DL — SIGNIFICANT CHANGE UP (ref 0.2–1.2)
BLOOD GAS VENOUS - CREATININE: SIGNIFICANT CHANGE UP MG/DL (ref 0.5–1.3)
BUN SERPL-MCNC: 15 MG/DL — SIGNIFICANT CHANGE UP (ref 7–23)
CA-I SERPL-SCNC: 0.93 MMOL/L — LOW (ref 1.12–1.3)
CA-I SERPL-SCNC: 0.97 MMOL/L — LOW (ref 1.12–1.3)
CA-I SERPL-SCNC: 0.99 MMOL/L — LOW (ref 1.12–1.3)
CA-I SERPL-SCNC: 0.99 MMOL/L — LOW (ref 1.12–1.3)
CA-I SERPL-SCNC: 1 MMOL/L — LOW (ref 1.12–1.3)
CA-I SERPL-SCNC: 1.01 MMOL/L — LOW (ref 1.12–1.3)
CALCIUM SERPL-MCNC: 8.2 MG/DL — LOW (ref 8.4–10.5)
CHLORIDE BLDV-SCNC: SIGNIFICANT CHANGE UP MMOL/L (ref 96–108)
CHLORIDE SERPL-SCNC: 105 MMOL/L — SIGNIFICANT CHANGE UP (ref 96–108)
CK MB BLD-MCNC: 12.5 % — HIGH (ref 0–3.5)
CK MB CFR SERPL CALC: 21.7 NG/ML — HIGH (ref 0–3.8)
CK SERPL-CCNC: 174 U/L — HIGH (ref 25–170)
CO2 SERPL-SCNC: 20 MMOL/L — LOW (ref 22–31)
CREAT SERPL-MCNC: 0.46 MG/DL — LOW (ref 0.5–1.3)
EOSINOPHIL # BLD AUTO: 0.06 K/UL — SIGNIFICANT CHANGE UP (ref 0–0.5)
EOSINOPHIL NFR BLD AUTO: 0.5 % — SIGNIFICANT CHANGE UP (ref 0–6)
FIBRINOGEN PPP-MCNC: 289 MG/DL — LOW (ref 350–510)
GAS PNL BLDA: SIGNIFICANT CHANGE UP
GAS PNL BLDV: 139 MMOL/L — SIGNIFICANT CHANGE UP (ref 135–145)
GAS PNL BLDV: 140 MMOL/L — SIGNIFICANT CHANGE UP (ref 135–145)
GAS PNL BLDV: 140 MMOL/L — SIGNIFICANT CHANGE UP (ref 135–145)
GAS PNL BLDV: 141 MMOL/L — SIGNIFICANT CHANGE UP (ref 135–145)
GAS PNL BLDV: SIGNIFICANT CHANGE UP
GLUCOSE BLDV-MCNC: 113 MG/DL — HIGH (ref 70–99)
GLUCOSE BLDV-MCNC: 127 MG/DL — HIGH (ref 70–99)
GLUCOSE BLDV-MCNC: 139 MG/DL — HIGH (ref 70–99)
GLUCOSE BLDV-MCNC: 153 MG/DL — HIGH (ref 70–99)
GLUCOSE BLDV-MCNC: 162 MG/DL — HIGH (ref 70–99)
GLUCOSE BLDV-MCNC: 177 MG/DL — HIGH (ref 70–99)
GLUCOSE SERPL-MCNC: 126 MG/DL — HIGH (ref 70–99)
HCO3 BLDV-SCNC: 22 MMOL/L — SIGNIFICANT CHANGE UP (ref 21–29)
HCO3 BLDV-SCNC: 26 MMOL/L — SIGNIFICANT CHANGE UP (ref 21–29)
HCO3 BLDV-SCNC: 27 MMOL/L — SIGNIFICANT CHANGE UP (ref 21–29)
HCO3 BLDV-SCNC: 28 MMOL/L — SIGNIFICANT CHANGE UP (ref 21–29)
HCO3 BLDV-SCNC: 28 MMOL/L — SIGNIFICANT CHANGE UP (ref 21–29)
HCO3 BLDV-SCNC: 29 MMOL/L — SIGNIFICANT CHANGE UP (ref 21–29)
HCT VFR BLD CALC: 25.7 % — LOW (ref 34.5–45)
HCT VFR BLD CALC: 28.1 % — LOW (ref 34.5–45)
HCT VFR BLDA CALC: 24 % — LOW (ref 39–50)
HCT VFR BLDA CALC: 25 % — LOW (ref 39–50)
HCT VFR BLDA CALC: 28 % — LOW (ref 39–50)
HCT VFR BLDA CALC: 30 % — LOW (ref 39–50)
HGB BLD CALC-MCNC: 7.6 G/DL — LOW (ref 11.5–15.5)
HGB BLD CALC-MCNC: 8 G/DL — LOW (ref 11.5–15.5)
HGB BLD CALC-MCNC: 8.9 G/DL — LOW (ref 11.5–15.5)
HGB BLD CALC-MCNC: 9.1 G/DL — LOW (ref 11.5–15.5)
HGB BLD CALC-MCNC: 9.1 G/DL — LOW (ref 11.5–15.5)
HGB BLD CALC-MCNC: 9.7 G/DL — LOW (ref 11.5–15.5)
HGB BLD-MCNC: 8.5 G/DL — LOW (ref 11.5–15.5)
HGB BLD-MCNC: 9.1 G/DL — LOW (ref 11.5–15.5)
HOROWITZ INDEX BLDV+IHG-RTO: 0 — SIGNIFICANT CHANGE UP
IMM GRANULOCYTES NFR BLD AUTO: 1.2 % — SIGNIFICANT CHANGE UP (ref 0–1.5)
INR BLD: 1.07 RATIO — SIGNIFICANT CHANGE UP (ref 0.88–1.16)
LACTATE BLDV-MCNC: 0.8 MMOL/L — SIGNIFICANT CHANGE UP (ref 0.7–2)
LACTATE BLDV-MCNC: 0.9 MMOL/L — SIGNIFICANT CHANGE UP (ref 0.7–2)
LACTATE BLDV-MCNC: 1 MMOL/L — SIGNIFICANT CHANGE UP (ref 0.7–2)
LACTATE BLDV-MCNC: 1.4 MMOL/L — SIGNIFICANT CHANGE UP (ref 0.7–2)
LACTATE BLDV-MCNC: 1.6 MMOL/L — SIGNIFICANT CHANGE UP (ref 0.7–2)
LACTATE BLDV-MCNC: 2.4 MMOL/L — HIGH (ref 0.7–2)
LYMPHOCYTES # BLD AUTO: 1.1 K/UL — SIGNIFICANT CHANGE UP (ref 1–3.3)
LYMPHOCYTES # BLD AUTO: 9.7 % — LOW (ref 13–44)
MAGNESIUM SERPL-MCNC: 2.8 MG/DL — HIGH (ref 1.6–2.6)
MCHC RBC-ENTMCNC: 31.8 PG — SIGNIFICANT CHANGE UP (ref 27–34)
MCHC RBC-ENTMCNC: 32 PG — SIGNIFICANT CHANGE UP (ref 27–34)
MCHC RBC-ENTMCNC: 32.4 GM/DL — SIGNIFICANT CHANGE UP (ref 32–36)
MCHC RBC-ENTMCNC: 33.1 GM/DL — SIGNIFICANT CHANGE UP (ref 32–36)
MCV RBC AUTO: 96.6 FL — SIGNIFICANT CHANGE UP (ref 80–100)
MCV RBC AUTO: 98.3 FL — SIGNIFICANT CHANGE UP (ref 80–100)
MONOCYTES # BLD AUTO: 0.62 K/UL — SIGNIFICANT CHANGE UP (ref 0–0.9)
MONOCYTES NFR BLD AUTO: 5.5 % — SIGNIFICANT CHANGE UP (ref 2–14)
NEUTROPHILS # BLD AUTO: 9.37 K/UL — HIGH (ref 1.8–7.4)
NEUTROPHILS NFR BLD AUTO: 82.8 % — HIGH (ref 43–77)
NRBC # BLD: 0 /100 WBCS — SIGNIFICANT CHANGE UP (ref 0–0)
NRBC # BLD: 0 /100 WBCS — SIGNIFICANT CHANGE UP (ref 0–0)
PCO2 BLDV: 40 MMHG — SIGNIFICANT CHANGE UP (ref 35–50)
PCO2 BLDV: 48 MMHG — SIGNIFICANT CHANGE UP (ref 35–50)
PCO2 BLDV: 49 MMHG — SIGNIFICANT CHANGE UP (ref 35–50)
PCO2 BLDV: 53 MMHG — HIGH (ref 35–50)
PCO2 BLDV: 54 MMHG — HIGH (ref 35–50)
PCO2 BLDV: 58 MMHG — HIGH (ref 35–50)
PH BLDV: 7.27 — LOW (ref 7.35–7.45)
PH BLDV: 7.34 — LOW (ref 7.35–7.45)
PH BLDV: 7.34 — LOW (ref 7.35–7.45)
PH BLDV: 7.36 — SIGNIFICANT CHANGE UP (ref 7.35–7.45)
PH BLDV: 7.37 — SIGNIFICANT CHANGE UP (ref 7.35–7.45)
PH BLDV: 7.37 — SIGNIFICANT CHANGE UP (ref 7.35–7.45)
PHOSPHATE SERPL-MCNC: 2.2 MG/DL — LOW (ref 2.5–4.5)
PLATELET # BLD AUTO: 145 K/UL — LOW (ref 150–400)
PLATELET # BLD AUTO: SIGNIFICANT CHANGE UP K/UL (ref 150–400)
PLATELET MAPPING ACTF MAX AMPLITUDE: 10.9 MM — SIGNIFICANT CHANGE UP (ref 2–19)
PLATELET MAPPING ADP MAXIMUM AMPLITUDE: 57.6 MM — SIGNIFICANT CHANGE UP (ref 45–69)
PLATELET MAPPING ADP PERCENT INHIBITION: 13.8 % — SIGNIFICANT CHANGE UP (ref 0–17)
PLATELET MAPPING HKH MAXIMUM AMPLITUDE: 65.1 MM — SIGNIFICANT CHANGE UP (ref 53–68)
PO2 BLDV: 132 MMHG — HIGH (ref 25–45)
PO2 BLDV: 138 MMHG — HIGH (ref 25–45)
PO2 BLDV: 143 MMHG — HIGH (ref 25–45)
PO2 BLDV: 56 MMHG — HIGH (ref 25–45)
PO2 BLDV: 56 MMHG — HIGH (ref 25–45)
PO2 BLDV: 86 MMHG — HIGH (ref 25–45)
POTASSIUM BLDV-SCNC: 3.5 MMOL/L — SIGNIFICANT CHANGE UP (ref 3.5–5)
POTASSIUM BLDV-SCNC: 4 MMOL/L — SIGNIFICANT CHANGE UP (ref 3.5–5)
POTASSIUM BLDV-SCNC: 4.7 MMOL/L — SIGNIFICANT CHANGE UP (ref 3.5–5)
POTASSIUM BLDV-SCNC: 5.1 MMOL/L — HIGH (ref 3.5–5)
POTASSIUM BLDV-SCNC: 5.2 MMOL/L — HIGH (ref 3.5–5)
POTASSIUM BLDV-SCNC: 5.5 MMOL/L — HIGH (ref 3.5–5)
POTASSIUM SERPL-MCNC: 3.8 MMOL/L — SIGNIFICANT CHANGE UP (ref 3.5–5.3)
POTASSIUM SERPL-SCNC: 3.8 MMOL/L — SIGNIFICANT CHANGE UP (ref 3.5–5.3)
PROT SERPL-MCNC: 4.1 G/DL — LOW (ref 6–8.3)
PROTHROM AB SERPL-ACNC: 12.2 SEC — SIGNIFICANT CHANGE UP (ref 10–12.9)
RBC # BLD: 2.66 M/UL — LOW (ref 3.8–5.2)
RBC # BLD: 2.86 M/UL — LOW (ref 3.8–5.2)
RBC # FLD: 13.5 % — SIGNIFICANT CHANGE UP (ref 10.3–14.5)
RBC # FLD: 13.5 % — SIGNIFICANT CHANGE UP (ref 10.3–14.5)
SAO2 % BLDV: 82 % — SIGNIFICANT CHANGE UP (ref 67–88)
SAO2 % BLDV: 86 % — SIGNIFICANT CHANGE UP (ref 67–88)
SAO2 % BLDV: 96 % — HIGH (ref 67–88)
SAO2 % BLDV: 99 % — HIGH (ref 67–88)
SODIUM SERPL-SCNC: 143 MMOL/L — SIGNIFICANT CHANGE UP (ref 135–145)
TROPONIN T, HIGH SENSITIVITY RESULT: 289 NG/L — HIGH (ref 0–51)
WBC # BLD: 11.32 K/UL — HIGH (ref 3.8–10.5)
WBC # BLD: 7.38 K/UL — SIGNIFICANT CHANGE UP (ref 3.8–10.5)
WBC # FLD AUTO: 11.32 K/UL — HIGH (ref 3.8–10.5)
WBC # FLD AUTO: 7.38 K/UL — SIGNIFICANT CHANGE UP (ref 3.8–10.5)

## 2020-02-19 PROCEDURE — 93010 ELECTROCARDIOGRAM REPORT: CPT

## 2020-02-19 PROCEDURE — 88304 TISSUE EXAM BY PATHOLOGIST: CPT | Mod: 26

## 2020-02-19 PROCEDURE — 71045 X-RAY EXAM CHEST 1 VIEW: CPT | Mod: 26

## 2020-02-19 PROCEDURE — 88305 TISSUE EXAM BY PATHOLOGIST: CPT | Mod: 26

## 2020-02-19 PROCEDURE — 33859 AS-AORT GRF F/DS OTH/THN DSJ: CPT

## 2020-02-19 PROCEDURE — 99291 CRITICAL CARE FIRST HOUR: CPT

## 2020-02-19 PROCEDURE — 33405 REPLACEMENT AORTIC VALVE OPN: CPT

## 2020-02-19 RX ORDER — PANTOPRAZOLE SODIUM 20 MG/1
40 TABLET, DELAYED RELEASE ORAL DAILY
Refills: 0 | Status: DISCONTINUED | OUTPATIENT
Start: 2020-02-19 | End: 2020-02-26

## 2020-02-19 RX ORDER — CEFUROXIME AXETIL 250 MG
1500 TABLET ORAL ONCE
Refills: 0 | Status: DISCONTINUED | OUTPATIENT
Start: 2020-02-19 | End: 2020-02-19

## 2020-02-19 RX ORDER — PROPOFOL 10 MG/ML
50 INJECTION, EMULSION INTRAVENOUS
Qty: 1000 | Refills: 0 | Status: DISCONTINUED | OUTPATIENT
Start: 2020-02-19 | End: 2020-02-20

## 2020-02-19 RX ORDER — DULOXETINE HYDROCHLORIDE 30 MG/1
60 CAPSULE, DELAYED RELEASE ORAL
Refills: 0 | Status: DISCONTINUED | OUTPATIENT
Start: 2020-02-20 | End: 2020-03-01

## 2020-02-19 RX ORDER — MEPERIDINE HYDROCHLORIDE 50 MG/ML
25 INJECTION INTRAMUSCULAR; INTRAVENOUS; SUBCUTANEOUS ONCE
Refills: 0 | Status: DISCONTINUED | OUTPATIENT
Start: 2020-02-19 | End: 2020-02-20

## 2020-02-19 RX ORDER — DOBUTAMINE HCL 250MG/20ML
2.5 VIAL (ML) INTRAVENOUS
Qty: 500 | Refills: 0 | Status: DISCONTINUED | OUTPATIENT
Start: 2020-02-19 | End: 2020-02-20

## 2020-02-19 RX ORDER — POTASSIUM CHLORIDE 20 MEQ
10 PACKET (EA) ORAL
Refills: 0 | Status: DISCONTINUED | OUTPATIENT
Start: 2020-02-19 | End: 2020-02-22

## 2020-02-19 RX ORDER — ALBUMIN HUMAN 25 %
250 VIAL (ML) INTRAVENOUS ONCE
Refills: 0 | Status: COMPLETED | OUTPATIENT
Start: 2020-02-19 | End: 2020-02-19

## 2020-02-19 RX ORDER — DEXTROSE 50 % IN WATER 50 %
25 SYRINGE (ML) INTRAVENOUS
Refills: 0 | Status: DISCONTINUED | OUTPATIENT
Start: 2020-02-19 | End: 2020-02-20

## 2020-02-19 RX ORDER — POTASSIUM CHLORIDE 20 MEQ
10 PACKET (EA) ORAL
Refills: 0 | Status: COMPLETED | OUTPATIENT
Start: 2020-02-19 | End: 2020-02-19

## 2020-02-19 RX ORDER — VANCOMYCIN HCL 1 G
1250 VIAL (EA) INTRAVENOUS ONCE
Refills: 0 | Status: COMPLETED | OUTPATIENT
Start: 2020-02-19 | End: 2020-02-19

## 2020-02-19 RX ORDER — SODIUM CHLORIDE 9 MG/ML
1000 INJECTION, SOLUTION INTRAVENOUS
Refills: 0 | Status: DISCONTINUED | OUTPATIENT
Start: 2020-02-19 | End: 2020-02-20

## 2020-02-19 RX ORDER — FENTANYL CITRATE 50 UG/ML
50 INJECTION INTRAVENOUS ONCE
Refills: 0 | Status: DISCONTINUED | OUTPATIENT
Start: 2020-02-19 | End: 2020-02-19

## 2020-02-19 RX ORDER — LIDOCAINE HCL 20 MG/ML
0.2 VIAL (ML) INJECTION ONCE
Refills: 0 | Status: DISCONTINUED | OUTPATIENT
Start: 2020-02-19 | End: 2020-02-19

## 2020-02-19 RX ORDER — SODIUM CHLORIDE 9 MG/ML
250 INJECTION, SOLUTION INTRAVENOUS ONCE
Refills: 0 | Status: COMPLETED | OUTPATIENT
Start: 2020-02-19 | End: 2020-02-19

## 2020-02-19 RX ORDER — CHLORHEXIDINE GLUCONATE 213 G/1000ML
1 SOLUTION TOPICAL ONCE
Refills: 0 | Status: DISCONTINUED | OUTPATIENT
Start: 2020-02-19 | End: 2020-02-19

## 2020-02-19 RX ORDER — ALBUMIN HUMAN 25 %
250 VIAL (ML) INTRAVENOUS
Refills: 0 | Status: COMPLETED | OUTPATIENT
Start: 2020-02-19 | End: 2020-02-19

## 2020-02-19 RX ORDER — POTASSIUM PHOSPHATE, MONOBASIC POTASSIUM PHOSPHATE, DIBASIC 236; 224 MG/ML; MG/ML
30 INJECTION, SOLUTION INTRAVENOUS ONCE
Refills: 0 | Status: COMPLETED | OUTPATIENT
Start: 2020-02-19 | End: 2020-02-19

## 2020-02-19 RX ORDER — LEVOTHYROXINE SODIUM 125 MCG
150 TABLET ORAL DAILY
Refills: 0 | Status: DISCONTINUED | OUTPATIENT
Start: 2020-02-20 | End: 2020-03-01

## 2020-02-19 RX ORDER — ATORVASTATIN CALCIUM 80 MG/1
40 TABLET, FILM COATED ORAL AT BEDTIME
Refills: 0 | Status: DISCONTINUED | OUTPATIENT
Start: 2020-02-20 | End: 2020-03-01

## 2020-02-19 RX ORDER — DEXMEDETOMIDINE HYDROCHLORIDE IN 0.9% SODIUM CHLORIDE 4 UG/ML
0.3 INJECTION INTRAVENOUS
Qty: 200 | Refills: 0 | Status: DISCONTINUED | OUTPATIENT
Start: 2020-02-19 | End: 2020-02-20

## 2020-02-19 RX ORDER — POLYETHYLENE GLYCOL 3350 17 G/17G
17 POWDER, FOR SOLUTION ORAL DAILY
Refills: 0 | Status: DISCONTINUED | OUTPATIENT
Start: 2020-02-19 | End: 2020-03-01

## 2020-02-19 RX ORDER — MUPIROCIN 20 MG/G
1 OINTMENT TOPICAL EVERY 12 HOURS
Refills: 0 | Status: COMPLETED | OUTPATIENT
Start: 2020-02-19 | End: 2020-02-24

## 2020-02-19 RX ORDER — METOCLOPRAMIDE HCL 10 MG
10 TABLET ORAL EVERY 8 HOURS
Refills: 0 | Status: COMPLETED | OUTPATIENT
Start: 2020-02-19 | End: 2020-02-21

## 2020-02-19 RX ORDER — DEXTROSE 50 % IN WATER 50 %
50 SYRINGE (ML) INTRAVENOUS
Refills: 0 | Status: DISCONTINUED | OUTPATIENT
Start: 2020-02-19 | End: 2020-02-20

## 2020-02-19 RX ORDER — SODIUM CHLORIDE 9 MG/ML
1000 INJECTION INTRAMUSCULAR; INTRAVENOUS; SUBCUTANEOUS
Refills: 0 | Status: DISCONTINUED | OUTPATIENT
Start: 2020-02-19 | End: 2020-02-23

## 2020-02-19 RX ORDER — SODIUM CHLORIDE 9 MG/ML
1500 INJECTION, SOLUTION INTRAVENOUS ONCE
Refills: 0 | Status: COMPLETED | OUTPATIENT
Start: 2020-02-19 | End: 2020-02-19

## 2020-02-19 RX ORDER — CYCLOBENZAPRINE HYDROCHLORIDE 10 MG/1
10 TABLET, FILM COATED ORAL THREE TIMES A DAY
Refills: 0 | Status: DISCONTINUED | OUTPATIENT
Start: 2020-02-20 | End: 2020-03-01

## 2020-02-19 RX ORDER — NICARDIPINE HYDROCHLORIDE 30 MG/1
5 CAPSULE, EXTENDED RELEASE ORAL
Qty: 40 | Refills: 0 | Status: DISCONTINUED | OUTPATIENT
Start: 2020-02-19 | End: 2020-02-20

## 2020-02-19 RX ORDER — VANCOMYCIN HCL 1 G
1000 VIAL (EA) INTRAVENOUS EVERY 12 HOURS
Refills: 0 | Status: COMPLETED | OUTPATIENT
Start: 2020-02-19 | End: 2020-02-20

## 2020-02-19 RX ORDER — CHLORHEXIDINE GLUCONATE 213 G/1000ML
5 SOLUTION TOPICAL EVERY 4 HOURS
Refills: 0 | Status: DISCONTINUED | OUTPATIENT
Start: 2020-02-19 | End: 2020-02-20

## 2020-02-19 RX ORDER — CHLORHEXIDINE GLUCONATE 213 G/1000ML
1 SOLUTION TOPICAL
Refills: 0 | Status: DISCONTINUED | OUTPATIENT
Start: 2020-02-19 | End: 2020-03-01

## 2020-02-19 RX ORDER — SODIUM CHLORIDE 9 MG/ML
250 INJECTION INTRAMUSCULAR; INTRAVENOUS; SUBCUTANEOUS ONCE
Refills: 0 | Status: COMPLETED | OUTPATIENT
Start: 2020-02-19 | End: 2020-02-19

## 2020-02-19 RX ORDER — INSULIN HUMAN 100 [IU]/ML
3 INJECTION, SOLUTION SUBCUTANEOUS
Qty: 100 | Refills: 0 | Status: DISCONTINUED | OUTPATIENT
Start: 2020-02-19 | End: 2020-02-20

## 2020-02-19 RX ORDER — CEFUROXIME AXETIL 250 MG
1500 TABLET ORAL EVERY 8 HOURS
Refills: 0 | Status: COMPLETED | OUTPATIENT
Start: 2020-02-19 | End: 2020-02-21

## 2020-02-19 RX ORDER — MILRINONE LACTATE 1 MG/ML
0.2 INJECTION, SOLUTION INTRAVENOUS
Qty: 20 | Refills: 0 | Status: DISCONTINUED | OUTPATIENT
Start: 2020-02-19 | End: 2020-02-20

## 2020-02-19 RX ADMIN — FENTANYL CITRATE 50 MICROGRAM(S): 50 INJECTION INTRAVENOUS at 16:00

## 2020-02-19 RX ADMIN — Medication 6.66 MICROGRAM(S)/KG/MIN: at 17:09

## 2020-02-19 RX ADMIN — Medication 125 MILLILITER(S): at 17:07

## 2020-02-19 RX ADMIN — PROPOFOL 26.64 MICROGRAM(S)/KG/MIN: 10 INJECTION, EMULSION INTRAVENOUS at 20:18

## 2020-02-19 RX ADMIN — DEXMEDETOMIDINE HYDROCHLORIDE IN 0.9% SODIUM CHLORIDE 6.66 MICROGRAM(S)/KG/HR: 4 INJECTION INTRAVENOUS at 17:08

## 2020-02-19 RX ADMIN — Medication 100 MILLIEQUIVALENT(S): at 21:12

## 2020-02-19 RX ADMIN — Medication 125 MILLILITER(S): at 14:00

## 2020-02-19 RX ADMIN — Medication 100 MILLIEQUIVALENT(S): at 22:53

## 2020-02-19 RX ADMIN — PANTOPRAZOLE SODIUM 40 MILLIGRAM(S): 20 TABLET, DELAYED RELEASE ORAL at 16:25

## 2020-02-19 RX ADMIN — Medication 100 MILLIGRAM(S): at 16:26

## 2020-02-19 RX ADMIN — NICARDIPINE HYDROCHLORIDE 25 MG/HR: 30 CAPSULE, EXTENDED RELEASE ORAL at 17:08

## 2020-02-19 RX ADMIN — FENTANYL CITRATE 50 MICROGRAM(S): 50 INJECTION INTRAVENOUS at 16:15

## 2020-02-19 RX ADMIN — CHLORHEXIDINE GLUCONATE 5 MILLILITER(S): 213 SOLUTION TOPICAL at 21:13

## 2020-02-19 RX ADMIN — Medication 125 MILLILITER(S): at 15:30

## 2020-02-19 RX ADMIN — Medication 125 MILLILITER(S): at 22:26

## 2020-02-19 RX ADMIN — NICARDIPINE HYDROCHLORIDE 25 MG/HR: 30 CAPSULE, EXTENDED RELEASE ORAL at 20:18

## 2020-02-19 RX ADMIN — Medication 100 MILLIEQUIVALENT(S): at 15:30

## 2020-02-19 RX ADMIN — SODIUM CHLORIDE 10 MILLILITER(S): 9 INJECTION INTRAMUSCULAR; INTRAVENOUS; SUBCUTANEOUS at 17:08

## 2020-02-19 RX ADMIN — Medication 100 MILLIEQUIVALENT(S): at 17:06

## 2020-02-19 RX ADMIN — CHLORHEXIDINE GLUCONATE 1 APPLICATION(S): 213 SOLUTION TOPICAL at 20:17

## 2020-02-19 RX ADMIN — SODIUM CHLORIDE 3 MILLILITER(S): 9 INJECTION INTRAMUSCULAR; INTRAVENOUS; SUBCUTANEOUS at 21:27

## 2020-02-19 RX ADMIN — Medication 6.66 MICROGRAM(S)/KG/MIN: at 20:18

## 2020-02-19 RX ADMIN — SODIUM CHLORIDE 250 MILLILITER(S): 9 INJECTION INTRAMUSCULAR; INTRAVENOUS; SUBCUTANEOUS at 20:33

## 2020-02-19 RX ADMIN — Medication 10 MILLIGRAM(S): at 21:13

## 2020-02-19 RX ADMIN — Medication 125 MILLILITER(S): at 14:45

## 2020-02-19 RX ADMIN — POTASSIUM PHOSPHATE, MONOBASIC POTASSIUM PHOSPHATE, DIBASIC 83.33 MILLIMOLE(S): 236; 224 INJECTION, SOLUTION INTRAVENOUS at 17:48

## 2020-02-19 RX ADMIN — Medication 250 MILLIGRAM(S): at 20:17

## 2020-02-19 RX ADMIN — SODIUM CHLORIDE 3 MILLILITER(S): 9 INJECTION INTRAMUSCULAR; INTRAVENOUS; SUBCUTANEOUS at 16:03

## 2020-02-19 RX ADMIN — SODIUM CHLORIDE 750 MILLILITER(S): 9 INJECTION, SOLUTION INTRAVENOUS at 15:30

## 2020-02-19 RX ADMIN — SODIUM CHLORIDE 3000 MILLILITER(S): 9 INJECTION, SOLUTION INTRAVENOUS at 14:00

## 2020-02-19 RX ADMIN — Medication 125 MILLILITER(S): at 22:06

## 2020-02-19 RX ADMIN — CHLORHEXIDINE GLUCONATE 5 MILLILITER(S): 213 SOLUTION TOPICAL at 17:06

## 2020-02-19 RX ADMIN — MUPIROCIN 1 APPLICATION(S): 20 OINTMENT TOPICAL at 17:06

## 2020-02-19 RX ADMIN — MILRINONE LACTATE 10.66 MICROGRAM(S)/KG/MIN: 1 INJECTION, SOLUTION INTRAVENOUS at 23:31

## 2020-02-19 RX ADMIN — Medication 100 MILLIEQUIVALENT(S): at 20:32

## 2020-02-19 RX ADMIN — DEXMEDETOMIDINE HYDROCHLORIDE IN 0.9% SODIUM CHLORIDE 6.66 MICROGRAM(S)/KG/HR: 4 INJECTION INTRAVENOUS at 20:17

## 2020-02-19 RX ADMIN — PROPOFOL 26.64 MICROGRAM(S)/KG/MIN: 10 INJECTION, EMULSION INTRAVENOUS at 17:09

## 2020-02-19 NOTE — PRE-ANESTHESIA EVALUATION ADULT - NSANTHSNORERD_ENT_A_CORE
As per father, asthma and cough x3 days, took home nebulizers with no relief. No fever nor chills. Audibile wheezes on auscultation. No distress. Pt playful in triage. No

## 2020-02-19 NOTE — PRE-OP CHECKLIST - SITE MARKED BY ANESTHESIOLOGIST
Anesthesia Pre-Procedure Evaluation    Patient: Bindu Forte   MRN: [id number] : 1976          Preoperative Diagnosis: .    Procedure(s):  MINILAPAROTOMY, POSTPARTUM, WITH TUBAL LIGATION    Past Medical History:   Diagnosis Date     Postpartum depression     currently being treated for anxiety and depression denies any problems at present on mreds     History reviewed. No pertinent surgical history.  Anesthesia Evaluation     .             ROS/MED HX    ENT/Pulmonary:  - neg pulmonary ROS     Neurologic:  - neg neurologic ROS     Cardiovascular:  - neg cardiovascular ROS       METS/Exercise Tolerance:     Hematologic:  - neg hematologic  ROS       Musculoskeletal:  - neg musculoskeletal ROS       GI/Hepatic:  - neg GI/hepatic ROS       Renal/Genitourinary:  - ROS Renal section negative       Endo:  - neg endo ROS       Psychiatric:  - neg psychiatric ROS       Infectious Disease:  - neg infectious disease ROS       Malignancy:         Other: Comment: Post partum tubal  .Lab Test        19                       1010          1726          1755          1909          WBC           --          8.6          8.4          7.2           HGB          10.9*        11.2*        11.4*        11.8          MCV           --          84           85           87            PLT           --          339          343          346            Lab Test        19                       1726          1755          1800          NA           137          137          138           POTASSIUM    3.8          3.9          4.4           CHLORIDE     106          108          107           CO2          24           23           23            BUN          6*           5*           7             CR           0.72         0.80         0.70          ANIONGAP     7            6            8             ARPITA          8.5          8.5          9.1           GLC  "         85           64*          84                                     Physical Exam  Normal systems: cardiovascular and pulmonary    Airway   Mallampati: II    Dental     Cardiovascular   Rhythm and rate: regular and normal      Pulmonary    breath sounds clear to auscultation            Lab Results   Component Value Date    WBC 8.6 08/06/2019    HGB 10.9 (L) 08/30/2019    HCT 34.6 (L) 08/06/2019     08/06/2019     08/06/2019    POTASSIUM 3.8 08/06/2019    CHLORIDE 106 08/06/2019    CO2 24 08/06/2019    BUN 6 (L) 08/06/2019    CR 0.72 08/06/2019    GLC 85 08/06/2019    ARPITA 8.5 08/06/2019    MAG 1.9 09/19/2014    ALBUMIN 2.8 (L) 08/06/2019    PROTTOTAL 7.2 08/06/2019    ALT 13 08/06/2019    AST 17 08/06/2019    ALKPHOS 159 (H) 08/06/2019    BILITOTAL 0.3 08/06/2019    TSH 2.85 01/23/2015    HCGS Negative 03/15/2007       Preop Vitals  BP Readings from Last 3 Encounters:   08/30/19 (!) 149/91   08/28/19 110/72   08/22/19 96/62    Pulse Readings from Last 3 Encounters:   08/30/19 68   06/04/19 90   01/10/19 80      Resp Readings from Last 3 Encounters:   08/30/19 16   06/04/19 16   01/10/19 18    SpO2 Readings from Last 3 Encounters:   08/30/19 100%   06/04/19 100%   01/10/19 100%      Temp Readings from Last 1 Encounters:   08/30/19 97.8  F (36.6  C) (Temporal)    Ht Readings from Last 1 Encounters:   08/30/19 1.676 m (5' 5.98\")      Wt Readings from Last 1 Encounters:   08/28/19 88.1 kg (194 lb 4.8 oz)    Estimated body mass index is 31.38 kg/m  as calculated from the following:    Height as of this encounter: 1.676 m (5' 5.98\").    Weight as of an earlier encounter on 8/28/19: 88.1 kg (194 lb 4.8 oz).       Anesthesia Plan      History & Physical Review  History and physical reviewed and following examination; no interval change.    ASA Status:  2 .        Plan for General and ETT with Intravenous induction. Maintenance will be Inhalation and Balanced.    PONV prophylaxis:  Ondansetron (or other " 5HT-3) and Dexamethasone or Solumedrol       Postoperative Care  Postoperative pain management:  IV analgesics, Oral pain medications and Multi-modal analgesia.      Consents  Anesthetic plan, risks, benefits and alternatives discussed with:  Patient or representative..                 Tamir Patiño DO                    .   n/a

## 2020-02-19 NOTE — BRIEF OPERATIVE NOTE - COMMENTS
Aortic Cross Clamp Time:  133mins    EBL unable to be determined secondary to usage of cardiotomy suction and usage of cellsaver

## 2020-02-19 NOTE — BRIEF OPERATIVE NOTE - NSICDXBRIEFPROCEDURE_GEN_ALL_CORE_FT
PROCEDURES:  Replacement, aortic valve, and ascending aorta 19-Feb-2020 14:16:47  Jaylen Duffy  Replacement, aortic valve, with prosthetic valve, with cardiopulmonary bypass 19-Feb-2020 14:15:08  Jaylen Duffy

## 2020-02-19 NOTE — BRIEF OPERATIVE NOTE - NSICDXBRIEFPREOP_GEN_ALL_CORE_FT
PRE-OP DIAGNOSIS:  Ascending aortic aneurysm 19-Feb-2020 14:12:57  Jaylen Duffy  Aortic stenosis 19-Feb-2020 14:12:45  Jaylen Duffy

## 2020-02-19 NOTE — BRIEF OPERATIVE NOTE - OPERATION/FINDINGS
Ascending aortic aneurysm and bicuspid aortic valve; Native aortic valve excised and ascending aorta resected; AVR #21 inserted and ascending aorta replaced with Hemashield Platinum #34mm graft

## 2020-02-19 NOTE — BRIEF OPERATIVE NOTE - NSICDXBRIEFPOSTOP_GEN_ALL_CORE_FT
POST-OP DIAGNOSIS:  Bicuspid aortic valve 19-Feb-2020 14:13:44  Jaylen Duffy  Ascending aortic aneurysm 19-Feb-2020 14:13:20  Jaylen Duffy  Aortic stenosis 19-Feb-2020 14:13:09  Jaylen Duffy

## 2020-02-19 NOTE — PRE-ANESTHESIA EVALUATION ADULT - NSANTHPMHFT_GEN_ALL_CORE
68 yo.  PMHx of HTN, HLD, spinal stenosis, bipolar depression, fibromyalgia, former smoker, hypothyroidism, osteoarthritis with right knee and bilateral hips (avascular hip necrosis with left hip dislocation), RA, SLE, heart murmur (mod AS)   Previous Echo showed moderate AS and dilatation of ascending aorta (5.1 cm) on recent imaging (CT), clean coronaries on cath.  Recent TTE on 9/24/2019 showed EF 65%, aortic valve is calcified, moderate AS, mGr 32.5 mmHg, pGr 53.9 mmHg, AoV 3.7 m/s, mild MR, mild TR and mild pulmonary HTN.

## 2020-02-19 NOTE — PROGRESS NOTE ADULT - SUBJECTIVE AND OBJECTIVE BOX
SUZIE ELY  MRN-[id number]  Patient is a 69y old  Female who presents with a chief complaint of "aneurysm repair and aortic valve surgery" (2020 15:57)    HPI:  70 yo female, PMH HTN, HLD, bipolar disorder, fibromyalgia, hypothyroid, chronic pain due to OA bilateral knees and hips, SLE, RA, known heart murmur due to moderate AS (follows up with cardiology), incidental finding dilation of ascending aorta 5.1cm on CT scan and aortic/thoracic aneurysm, had pre-op cardiac cath 19 (normal coronaries), pt. fell on left hip and suffered fracture, and surgery postponed. Pt. presents to PST today for scheduled Aortic Valve Replacement, Ascending Aortic Replacement on 20. Pt. was on antibiotics for a cough and taking her last dose today - no further cough, denies fever, chills, ches pain, SOB, changes in bowel/urinary habits.    Pt. will have her Fentanyl patch DOS - pt. was told to tell anesthesiologist that she is wearing it and it will have to be removed right before going to OR (d/w Dr. Castillo) Pain management consult requested (2020 15:57)      Surgery/Hospital course:  2020 Aortic valve replacement AVR/ Ascending aorta replacement     post op, intubated. full vent support  sedated  inotropic support dobutamine  BP control with cardene titrate       Physical Exam:  Vital Signs Last 24 Hrs  T(C): 35 (2020 14:00), Max: 36.5 (2020 06:29)  T(F): 95 (2020 14:00), Max: 97.7 (2020 06:29)  HR: 75 (2020 20:00) (60 - 87)  BP: 139/85 (2020 07:03) (139/85 - 139/85)  BP(mean): --  RR: 0 (2020 20:00) (0 - 20)  SpO2: 99% (2020 20:00) (94% - 100%)  Gen:  full vent support   CNS: sedated  Neck: no JVD  RES : clear , no wheezing    Chest:   + chest tubes                     CVS: Regular  rhythm. Normal S1/S2  Abd: Soft, non-distended. Bowel sounds present.  Skin: No rash.+ Tatoo  Ext:  no edema, A Line  ============================I/O===========================   I&O's Detail    2020 07:01  -  2020 20:10  --------------------------------------------------------  IN:    Albumin 5%  - 250 mL: 1000 mL    dexmedetomidine Infusion: 233.8 mL    DOBUTamine Infusion: 46.9 mL    IV PiggyBack: 200 mL    Lactated Ringers IV Bolus: 1750 mL    niCARdipine Infusion: 75 mL    propofol Infusion: 160.2 mL    sodium chloride 0.9%.: 70 mL  Total IN: 3535.9 mL    OUT:    Bulb: 185 mL    Chest Tube: 15 mL    Indwelling Catheter - Urethral: 3950 mL  Total OUT: 4150 mL    Total NET: -614.1 mL  ============================ LABS =========================                        9.1    7.38  )-----------( 145      ( 2020 19:45 )             28.1     -    143  |  105  |  15  ----------------------------<  126<H>  3.8   |  20<L>  |  0.46<L>    Ca    8.2<L>      2020 14:43  Phos  2.2       Mg     2.8         TPro  4.1<L>  /  Alb  2.8<L>  /  TBili  0.5  /  DBili  x   /  AST  20  /  ALT  8<L>  /  AlkPhos  34<L>      LIVER FUNCTIONS - ( 2020 14:43 )  Alb: 2.8 g/dL / Pro: 4.1 g/dL / ALK PHOS: 34 U/L / ALT: 8 U/L / AST: 20 U/L / GGT: x           PT/INR - ( 2020 14:42 )   PT: 12.2 sec;   INR: 1.07 ratio  PTT - ( 2020 14:42 )  PTT:29.7 sec  ABG - ( 2020 19:34 )pH, Arterial: 7.44  pH, Blood: x     /  pCO2: 40    /  pO2: 135   / HCO3: 26    / Base Excess: 2.4   /  SaO2: 99        ======================Micro/Rad/Cardio=================  CXR: Reviewed  Echo:Reviewed  ======================================================  PAST MEDICAL & SURGICAL HISTORY:  H/O aortic aneurysm: and thoracic aortic aneurysm  Moderate aortic stenosis  Subacute systemic lupus erythematosus  Cataract  Hypothyroid  Hip dislocation, left: avascular necrosis  Fibromyalgia  Uterine cancer  Spinal stenosis of lumbar region  Bipolar depression  HLD (hyperlipidemia)  HTN (hypertension)  H/O tubal ligation  History of cataract surgery  H/O total hysterectomy  H/O: : x2  History of hip surgery    ====================ASSESMENT ==============  2020 Aortic valve replacement AVR/ Ascending aorta replacement   acute  systolic CHF  hemodynamic instability  lactic acidosis  Anemia blood loss  Hypothyroidism   Spinal stenosis of lumbar region  Bipolar depression  HLD (hyperlipidemia)  HTN (hypertension)    Plan:  ====================== NEUROLOGY=====================  iv sedation  dexMEDEtomidine Infusion 0.3 MICROgram(s)/kG/Hr (6.66 mL/Hr) IV Continuous <Continuous>  meperidine     Injectable 25 milliGRAM(s) IV Push once  metoclopramide Injectable 10 milliGRAM(s) IV Push every 8 hours  propofol Infusion 50 MICROgram(s)/kG/Min (26.64 mL/Hr) IV Continuous <Continuous>    ==================== RESPIRATORY======================  Mechanical Ventilation:  Mode: AC/ CMV (Assist Control/ Continuous Mandatory Ventilation)  RR (machine): 10  TV (machine): 650  FiO2: 50  PEEP: 5  wean to extubate when stable    ====================CARDIOVASCULAR==================  inotropic support  BP control   DOBUTamine Infusion 2.5 MICROgram(s)/kG/Min (6.66 mL/Hr) IV Continuous <Continuous>  niCARdipine Infusion 5 mG/Hr (25 mL/Hr) IV Continuous <Continuous>    ===================HEMATOLOGIC/ONC ===================    monitor plts and Hb/Hct  ===================== RENAL =========================  Monsivais for monitoring urine output    ==================== GASTROINTESTINAL===================  dextrose 5%. 1000 milliLiter(s) (15 mL/Hr) IV Continuous <Continuous>  pantoprazole  Injectable 40 milliGRAM(s) IV Push daily  polyethylene glycol 3350 17 Gram(s) Oral daily  potassium chloride  10 mEq/50 mL IVPB 10 milliEquivalent(s) IV Intermittent every 1 hour  potassium chloride  10 mEq/50 mL IVPB 10 milliEquivalent(s) IV Intermittent every 1 hour  potassium chloride  10 mEq/50 mL IVPB 10 milliEquivalent(s) IV Intermittent every 1 hour  sodium chloride 0.9% lock flush 3 milliLiter(s) IV Push every 8 hours  sodium chloride 0.9%. 1000 milliLiter(s) (10 mL/Hr) IV Continuous <Continuous>    =======================    ENDOCRINE  =====================  glycemic control  insulin regular Infusion 3 Unit(s)/Hr (3 mL/Hr) IV Continuous <Continuous>    ========================INFECTIOUS DISEASE================  cefuroxime  IVPB 1500 milliGRAM(s) IV Intermittent every 8 hours  vancomycin  IVPB 1000 milliGRAM(s) IV Intermittent every 12 hours    Patient requires continuous monitoring with bedside rhythm monitoring,arterial line,pulse oximetry,ventilator monitoring;intermittent blood gas analysis.  Care plan discussed with ICU care team.  patient remain critical; required more than usual post op care; I have spent 40 minutes providing non routine post op care, revaluated multiple times during the day . SUZIE ELY  MRN-[id number]  Patient is a 69y old  Female who presents with a chief complaint of "aneurysm repair and aortic valve surgery" (2020 15:57)    HPI:  70 yo female, PMH HTN, HLD, bipolar disorder, fibromyalgia, hypothyroid, chronic pain due to OA bilateral knees and hips, SLE, RA, known heart murmur due to moderate AS (follows up with cardiology), incidental finding dilation of ascending aorta 5.1cm on CT scan and aortic/thoracic aneurysm, had pre-op cardiac cath 19 (normal coronaries), pt. fell on left hip and suffered fracture, and surgery postponed. Pt. presents to PST today for scheduled Aortic Valve Replacement, Ascending Aortic Replacement on 20. Pt. was on antibiotics for a cough and taking her last dose today - no further cough, denies fever, chills, ches pain, SOB, changes in bowel/urinary habits.    Pt. will have her Fentanyl patch DOS - pt. was told to tell anesthesiologist that she is wearing it and it will have to be removed right before going to OR (d/w Dr. Castillo) Pain management consult requested (2020 15:57)      Surgery/Hospital course:  2020 Aortic valve replacement AVR/ Ascending aorta replacement     post op, intubated. full vent support  sedated  inotropic support dobutamine  BP control with cardene titrate   pt becomes hypertensive whenever wake up for weaning, will switch Dobutamine to primacor.      Physical Exam:  Vital Signs Last 24 Hrs  T(C): 35 (2020 14:00), Max: 36.5 (2020 06:29)  T(F): 95 (2020 14:00), Max: 97.7 (2020 06:29)  HR: 75 (2020 20:00) (60 - 87)  BP: 139/85 (2020 07:03) (139/85 - 139/85)  BP(mean): --  RR: 0 (2020 20:00) (0 - 20)  SpO2: 99% (2020 20:00) (94% - 100%)  Gen:  full vent support   CNS: sedated  Neck: no JVD  RES : clear , no wheezing    Chest:   + chest tubes                     CVS: Regular  rhythm. Normal S1/S2  Abd: Soft, non-distended. Bowel sounds present.  Skin: No rash.+ Tatoo  Ext:  no edema, A Line  ============================I/O===========================   I&O's Detail    2020 07:01  -  2020 20:10  --------------------------------------------------------  IN:    Albumin 5%  - 250 mL: 1000 mL    dexmedetomidine Infusion: 233.8 mL    DOBUTamine Infusion: 46.9 mL    IV PiggyBack: 200 mL    Lactated Ringers IV Bolus: 1750 mL    niCARdipine Infusion: 75 mL    propofol Infusion: 160.2 mL    sodium chloride 0.9%.: 70 mL  Total IN: 3535.9 mL    OUT:    Bulb: 185 mL    Chest Tube: 15 mL    Indwelling Catheter - Urethral: 3950 mL  Total OUT: 4150 mL    Total NET: -614.1 mL  ============================ LABS =========================                        9.1    7.38  )-----------( 145      ( 2020 19:45 )             28.1         143  |  105  |  15  ----------------------------<  126<H>  3.8   |  20<L>  |  0.46<L>    Ca    8.2<L>      2020 14:43  Phos  2.2       Mg     2.8         TPro  4.1<L>  /  Alb  2.8<L>  /  TBili  0.5  /  DBili  x   /  AST  20  /  ALT  8<L>  /  AlkPhos  34<L>      LIVER FUNCTIONS - ( 2020 14:43 )  Alb: 2.8 g/dL / Pro: 4.1 g/dL / ALK PHOS: 34 U/L / ALT: 8 U/L / AST: 20 U/L / GGT: x           PT/INR - ( 2020 14:42 )   PT: 12.2 sec;   INR: 1.07 ratio  PTT - ( 2020 14:42 )  PTT:29.7 sec  ABG - ( 2020 19:34 )pH, Arterial: 7.44  pH, Blood: x     /  pCO2: 40    /  pO2: 135   / HCO3: 26    / Base Excess: 2.4   /  SaO2: 99        ======================Micro/Rad/Cardio=================  CXR: Reviewed  Echo:Reviewed  ======================================================  PAST MEDICAL & SURGICAL HISTORY:  H/O aortic aneurysm: and thoracic aortic aneurysm  Moderate aortic stenosis  Subacute systemic lupus erythematosus  Cataract  Hypothyroid  Hip dislocation, left: avascular necrosis  Fibromyalgia  Uterine cancer  Spinal stenosis of lumbar region  Bipolar depression  HLD (hyperlipidemia)  HTN (hypertension)  H/O tubal ligation  History of cataract surgery  H/O total hysterectomy  H/O: : x2  History of hip surgery    ====================ASSESMENT ==============  2020 Aortic valve replacement AVR/ Ascending aorta replacement   acute  systolic CHF  hemodynamic instability  lactic acidosis  Anemia blood loss  Hypothyroidism   Spinal stenosis of lumbar region  Bipolar depression  HLD (hyperlipidemia)  HTN (hypertension)    Plan:  post op, intubated. full vent support  sedated  inotropic support dobutamine  BP control with cardene titrate   pt becomes hypertensive whenever wake up for weaning, will switch Dobutamine to primacor.  ====================== NEUROLOGY=====================  iv sedation  dexMEDEtomidine Infusion 0.3 MICROgram(s)/kG/Hr (6.66 mL/Hr) IV Continuous <Continuous>  meperidine     Injectable 25 milliGRAM(s) IV Push once  metoclopramide Injectable 10 milliGRAM(s) IV Push every 8 hours  propofol Infusion 50 MICROgram(s)/kG/Min (26.64 mL/Hr) IV Continuous <Continuous>  ==================== RESPIRATORY======================  Mechanical Ventilation:  Mode: AC/ CMV (Assist Control/ Continuous Mandatory Ventilation)  RR (machine): 10  TV (machine): 650  FiO2: 50  PEEP: 5  wean to extubate when stable    ====================CARDIOVASCULAR==================  inotropic support  BP control   DOBUTamine Infusion 2.5 MICROgram(s)/kG/Min (6.66 mL/Hr) IV Continuous <Continuous>  niCARdipine Infusion 5 mG/Hr (25 mL/Hr) IV Continuous <Continuous>  add primacor drip   ===================HEMATOLOGIC/ONC ===================  monitor plts and Hb/Hct  ===================== RENAL =========================  Monsivais for monitoring urine output    ==================== GASTROINTESTINAL===================  dextrose 5%. 1000 milliLiter(s) (15 mL/Hr) IV Continuous <Continuous>  pantoprazole  Injectable 40 milliGRAM(s) IV Push daily  polyethylene glycol 3350 17 Gram(s) Oral daily  potassium chloride  10 mEq/50 mL IVPB 10 milliEquivalent(s) IV Intermittent every 1 hour  potassium chloride  10 mEq/50 mL IVPB 10 milliEquivalent(s) IV Intermittent every 1 hour  potassium chloride  10 mEq/50 mL IVPB 10 milliEquivalent(s) IV Intermittent every 1 hour  sodium chloride 0.9% lock flush 3 milliLiter(s) IV Push every 8 hours  sodium chloride 0.9%. 1000 milliLiter(s) (10 mL/Hr) IV Continuous <Continuous>    =======================    ENDOCRINE  =====================  glycemic control  insulin regular Infusion 3 Unit(s)/Hr (3 mL/Hr) IV Continuous <Continuous>    ========================INFECTIOUS DISEASE================  cefuroxime  IVPB 1500 milliGRAM(s) IV Intermittent every 8 hours  vancomycin  IVPB 1000 milliGRAM(s) IV Intermittent every 12 hours    Patient requires continuous monitoring with bedside rhythm monitoring,arterial line,pulse oximetry,ventilator monitoring;intermittent blood gas analysis.  Care plan discussed with ICU care team.  patient remain critical; required more than usual post op care; I have spent 40 minutes providing non routine post op care, revaluated multiple times during the day .

## 2020-02-20 LAB
ALBUMIN SERPL ELPH-MCNC: 4.4 G/DL — SIGNIFICANT CHANGE UP (ref 3.3–5)
ALP SERPL-CCNC: 37 U/L — LOW (ref 40–120)
ALT FLD-CCNC: 10 U/L — SIGNIFICANT CHANGE UP (ref 10–45)
ANION GAP SERPL CALC-SCNC: 12 MMOL/L — SIGNIFICANT CHANGE UP (ref 5–17)
APTT BLD: 34.4 SEC — SIGNIFICANT CHANGE UP (ref 27.5–36.3)
AST SERPL-CCNC: 26 U/L — SIGNIFICANT CHANGE UP (ref 10–40)
BILIRUB SERPL-MCNC: 0.5 MG/DL — SIGNIFICANT CHANGE UP (ref 0.2–1.2)
BUN SERPL-MCNC: 10 MG/DL — SIGNIFICANT CHANGE UP (ref 7–23)
CALCIUM SERPL-MCNC: 8.3 MG/DL — LOW (ref 8.4–10.5)
CHLORIDE SERPL-SCNC: 107 MMOL/L — SIGNIFICANT CHANGE UP (ref 96–108)
CO2 SERPL-SCNC: 23 MMOL/L — SIGNIFICANT CHANGE UP (ref 22–31)
CREAT SERPL-MCNC: 0.46 MG/DL — LOW (ref 0.5–1.3)
GAS PNL BLDA: SIGNIFICANT CHANGE UP
GLUCOSE SERPL-MCNC: 131 MG/DL — HIGH (ref 70–99)
HCT VFR BLD CALC: 27.4 % — LOW (ref 34.5–45)
HCT VFR BLD CALC: 27.7 % — LOW (ref 34.5–45)
HGB BLD-MCNC: 8.4 G/DL — LOW (ref 11.5–15.5)
HGB BLD-MCNC: 9 G/DL — LOW (ref 11.5–15.5)
INR BLD: 1.16 RATIO — SIGNIFICANT CHANGE UP (ref 0.88–1.16)
MAGNESIUM SERPL-MCNC: 2.4 MG/DL — SIGNIFICANT CHANGE UP (ref 1.6–2.6)
MCHC RBC-ENTMCNC: 30.7 GM/DL — LOW (ref 32–36)
MCHC RBC-ENTMCNC: 31.3 PG — SIGNIFICANT CHANGE UP (ref 27–34)
MCHC RBC-ENTMCNC: 31.7 PG — SIGNIFICANT CHANGE UP (ref 27–34)
MCHC RBC-ENTMCNC: 32.5 GM/DL — SIGNIFICANT CHANGE UP (ref 32–36)
MCV RBC AUTO: 102.2 FL — HIGH (ref 80–100)
MCV RBC AUTO: 97.5 FL — SIGNIFICANT CHANGE UP (ref 80–100)
NRBC # BLD: 0 /100 WBCS — SIGNIFICANT CHANGE UP (ref 0–0)
NRBC # BLD: 0 /100 WBCS — SIGNIFICANT CHANGE UP (ref 0–0)
PHOSPHATE SERPL-MCNC: 3.3 MG/DL — SIGNIFICANT CHANGE UP (ref 2.5–4.5)
PLATELET # BLD AUTO: 145 K/UL — LOW (ref 150–400)
PLATELET # BLD AUTO: 192 K/UL — SIGNIFICANT CHANGE UP (ref 150–400)
POTASSIUM SERPL-MCNC: 4.1 MMOL/L — SIGNIFICANT CHANGE UP (ref 3.5–5.3)
POTASSIUM SERPL-SCNC: 4.1 MMOL/L — SIGNIFICANT CHANGE UP (ref 3.5–5.3)
PROT SERPL-MCNC: 5.8 G/DL — LOW (ref 6–8.3)
PROTHROM AB SERPL-ACNC: 13.4 SEC — HIGH (ref 10–12.9)
RBC # BLD: 2.68 M/UL — LOW (ref 3.8–5.2)
RBC # BLD: 2.84 M/UL — LOW (ref 3.8–5.2)
RBC # FLD: 13.6 % — SIGNIFICANT CHANGE UP (ref 10.3–14.5)
RBC # FLD: 13.8 % — SIGNIFICANT CHANGE UP (ref 10.3–14.5)
SODIUM SERPL-SCNC: 142 MMOL/L — SIGNIFICANT CHANGE UP (ref 135–145)
WBC # BLD: 11.2 K/UL — HIGH (ref 3.8–10.5)
WBC # BLD: 6.84 K/UL — SIGNIFICANT CHANGE UP (ref 3.8–10.5)
WBC # FLD AUTO: 11.2 K/UL — HIGH (ref 3.8–10.5)
WBC # FLD AUTO: 6.84 K/UL — SIGNIFICANT CHANGE UP (ref 3.8–10.5)

## 2020-02-20 PROCEDURE — 71045 X-RAY EXAM CHEST 1 VIEW: CPT | Mod: 26,76

## 2020-02-20 PROCEDURE — 99291 CRITICAL CARE FIRST HOUR: CPT

## 2020-02-20 PROCEDURE — 93010 ELECTROCARDIOGRAM REPORT: CPT

## 2020-02-20 RX ORDER — ONDANSETRON 8 MG/1
4 TABLET, FILM COATED ORAL EVERY 6 HOURS
Refills: 0 | Status: DISCONTINUED | OUTPATIENT
Start: 2020-02-20 | End: 2020-03-01

## 2020-02-20 RX ORDER — INSULIN LISPRO 100/ML
VIAL (ML) SUBCUTANEOUS AT BEDTIME
Refills: 0 | Status: DISCONTINUED | OUTPATIENT
Start: 2020-02-20 | End: 2020-02-23

## 2020-02-20 RX ORDER — DEXTROSE 50 % IN WATER 50 %
15 SYRINGE (ML) INTRAVENOUS ONCE
Refills: 0 | Status: DISCONTINUED | OUTPATIENT
Start: 2020-02-20 | End: 2020-02-22

## 2020-02-20 RX ORDER — HYDROMORPHONE HYDROCHLORIDE 2 MG/ML
30 INJECTION INTRAMUSCULAR; INTRAVENOUS; SUBCUTANEOUS
Refills: 0 | Status: DISCONTINUED | OUTPATIENT
Start: 2020-02-20 | End: 2020-02-21

## 2020-02-20 RX ORDER — NALOXONE HYDROCHLORIDE 4 MG/.1ML
0.1 SPRAY NASAL
Refills: 0 | Status: DISCONTINUED | OUTPATIENT
Start: 2020-02-20 | End: 2020-02-21

## 2020-02-20 RX ORDER — AMINOCAPROIC ACID 500 MG/1
5 TABLET ORAL
Qty: 5 | Refills: 0 | Status: COMPLETED | OUTPATIENT
Start: 2020-02-20 | End: 2020-02-20

## 2020-02-20 RX ORDER — POTASSIUM CHLORIDE 20 MEQ
10 PACKET (EA) ORAL ONCE
Refills: 0 | Status: COMPLETED | OUTPATIENT
Start: 2020-02-20 | End: 2020-02-20

## 2020-02-20 RX ORDER — ENOXAPARIN SODIUM 100 MG/ML
40 INJECTION SUBCUTANEOUS DAILY
Refills: 0 | Status: DISCONTINUED | OUTPATIENT
Start: 2020-02-20 | End: 2020-02-21

## 2020-02-20 RX ORDER — HYDROMORPHONE HYDROCHLORIDE 2 MG/ML
0.5 INJECTION INTRAMUSCULAR; INTRAVENOUS; SUBCUTANEOUS
Refills: 0 | Status: DISCONTINUED | OUTPATIENT
Start: 2020-02-20 | End: 2020-02-21

## 2020-02-20 RX ORDER — POTASSIUM CHLORIDE 20 MEQ
10 PACKET (EA) ORAL ONCE
Refills: 0 | Status: DISCONTINUED | OUTPATIENT
Start: 2020-02-20 | End: 2020-02-20

## 2020-02-20 RX ORDER — SODIUM CHLORIDE 9 MG/ML
500 INJECTION, SOLUTION INTRAVENOUS ONCE
Refills: 0 | Status: COMPLETED | OUTPATIENT
Start: 2020-02-20 | End: 2020-02-20

## 2020-02-20 RX ORDER — ACETAMINOPHEN 500 MG
1000 TABLET ORAL ONCE
Refills: 0 | Status: COMPLETED | OUTPATIENT
Start: 2020-02-20 | End: 2020-02-20

## 2020-02-20 RX ORDER — DOBUTAMINE HCL 250MG/20ML
1.25 VIAL (ML) INTRAVENOUS
Qty: 500 | Refills: 0 | Status: DISCONTINUED | OUTPATIENT
Start: 2020-02-20 | End: 2020-02-20

## 2020-02-20 RX ORDER — INSULIN LISPRO 100/ML
VIAL (ML) SUBCUTANEOUS
Refills: 0 | Status: DISCONTINUED | OUTPATIENT
Start: 2020-02-20 | End: 2020-02-23

## 2020-02-20 RX ORDER — POTASSIUM CHLORIDE 20 MEQ
20 PACKET (EA) ORAL ONCE
Refills: 0 | Status: COMPLETED | OUTPATIENT
Start: 2020-02-20 | End: 2020-02-20

## 2020-02-20 RX ORDER — DEXTROSE 50 % IN WATER 50 %
12.5 SYRINGE (ML) INTRAVENOUS ONCE
Refills: 0 | Status: DISCONTINUED | OUTPATIENT
Start: 2020-02-20 | End: 2020-02-22

## 2020-02-20 RX ORDER — KETOROLAC TROMETHAMINE 30 MG/ML
30 SYRINGE (ML) INJECTION EVERY 8 HOURS
Refills: 0 | Status: DISCONTINUED | OUTPATIENT
Start: 2020-02-20 | End: 2020-02-21

## 2020-02-20 RX ORDER — POTASSIUM CHLORIDE 20 MEQ
10 PACKET (EA) ORAL
Refills: 0 | Status: COMPLETED | OUTPATIENT
Start: 2020-02-20 | End: 2020-02-20

## 2020-02-20 RX ORDER — KETOROLAC TROMETHAMINE 30 MG/ML
30 SYRINGE (ML) INJECTION ONCE
Refills: 0 | Status: DISCONTINUED | OUTPATIENT
Start: 2020-02-20 | End: 2020-02-20

## 2020-02-20 RX ORDER — ASPIRIN/CALCIUM CARB/MAGNESIUM 324 MG
325 TABLET ORAL DAILY
Refills: 0 | Status: DISCONTINUED | OUTPATIENT
Start: 2020-02-20 | End: 2020-03-01

## 2020-02-20 RX ORDER — NOREPINEPHRINE BITARTRATE/D5W 8 MG/250ML
0.05 PLASTIC BAG, INJECTION (ML) INTRAVENOUS
Qty: 8 | Refills: 0 | Status: DISCONTINUED | OUTPATIENT
Start: 2020-02-20 | End: 2020-02-21

## 2020-02-20 RX ORDER — SODIUM CHLORIDE 9 MG/ML
1000 INJECTION, SOLUTION INTRAVENOUS
Refills: 0 | Status: DISCONTINUED | OUTPATIENT
Start: 2020-02-20 | End: 2020-02-23

## 2020-02-20 RX ORDER — ALBUMIN HUMAN 25 %
250 VIAL (ML) INTRAVENOUS ONCE
Refills: 0 | Status: COMPLETED | OUTPATIENT
Start: 2020-02-20 | End: 2020-02-20

## 2020-02-20 RX ORDER — LAMOTRIGINE 25 MG/1
150 TABLET, ORALLY DISINTEGRATING ORAL EVERY 12 HOURS
Refills: 0 | Status: DISCONTINUED | OUTPATIENT
Start: 2020-02-20 | End: 2020-03-01

## 2020-02-20 RX ORDER — GLUCAGON INJECTION, SOLUTION 0.5 MG/.1ML
1 INJECTION, SOLUTION SUBCUTANEOUS ONCE
Refills: 0 | Status: DISCONTINUED | OUTPATIENT
Start: 2020-02-20 | End: 2020-02-22

## 2020-02-20 RX ORDER — FENTANYL CITRATE 50 UG/ML
50 INJECTION INTRAVENOUS ONCE
Refills: 0 | Status: DISCONTINUED | OUTPATIENT
Start: 2020-02-20 | End: 2020-02-20

## 2020-02-20 RX ORDER — METHADONE HYDROCHLORIDE 40 MG/1
10 TABLET ORAL EVERY 12 HOURS
Refills: 0 | Status: DISCONTINUED | OUTPATIENT
Start: 2020-02-20 | End: 2020-02-21

## 2020-02-20 RX ADMIN — CHLORHEXIDINE GLUCONATE 5 MILLILITER(S): 213 SOLUTION TOPICAL at 05:21

## 2020-02-20 RX ADMIN — Medication 20 MILLIEQUIVALENT(S): at 17:55

## 2020-02-20 RX ADMIN — Medication 30 MILLIGRAM(S): at 00:11

## 2020-02-20 RX ADMIN — FENTANYL CITRATE 50 MICROGRAM(S): 50 INJECTION INTRAVENOUS at 05:52

## 2020-02-20 RX ADMIN — Medication 100 MILLIGRAM(S): at 10:31

## 2020-02-20 RX ADMIN — Medication 100 MILLIEQUIVALENT(S): at 02:30

## 2020-02-20 RX ADMIN — METHADONE HYDROCHLORIDE 10 MILLIGRAM(S): 40 TABLET ORAL at 06:46

## 2020-02-20 RX ADMIN — AMINOCAPROIC ACID 250 GM/HR: 500 TABLET ORAL at 20:10

## 2020-02-20 RX ADMIN — Medication 3.33 MICROGRAM(S)/KG/MIN: at 08:10

## 2020-02-20 RX ADMIN — Medication 150 MICROGRAM(S): at 07:17

## 2020-02-20 RX ADMIN — ATORVASTATIN CALCIUM 40 MILLIGRAM(S): 80 TABLET, FILM COATED ORAL at 21:40

## 2020-02-20 RX ADMIN — Medication 100 MILLIEQUIVALENT(S): at 02:03

## 2020-02-20 RX ADMIN — CHLORHEXIDINE GLUCONATE 5 MILLILITER(S): 213 SOLUTION TOPICAL at 02:05

## 2020-02-20 RX ADMIN — HYDROMORPHONE HYDROCHLORIDE 30 MILLILITER(S): 2 INJECTION INTRAMUSCULAR; INTRAVENOUS; SUBCUTANEOUS at 07:58

## 2020-02-20 RX ADMIN — SODIUM CHLORIDE 3 MILLILITER(S): 9 INJECTION INTRAMUSCULAR; INTRAVENOUS; SUBCUTANEOUS at 13:05

## 2020-02-20 RX ADMIN — PANTOPRAZOLE SODIUM 40 MILLIGRAM(S): 20 TABLET, DELAYED RELEASE ORAL at 11:51

## 2020-02-20 RX ADMIN — SODIUM CHLORIDE 3 MILLILITER(S): 9 INJECTION INTRAMUSCULAR; INTRAVENOUS; SUBCUTANEOUS at 21:47

## 2020-02-20 RX ADMIN — Medication 30 MILLIGRAM(S): at 14:36

## 2020-02-20 RX ADMIN — Medication 1000 MILLIGRAM(S): at 10:11

## 2020-02-20 RX ADMIN — Medication 10 MILLIGRAM(S): at 21:40

## 2020-02-20 RX ADMIN — MUPIROCIN 1 APPLICATION(S): 20 OINTMENT TOPICAL at 18:46

## 2020-02-20 RX ADMIN — Medication 10 MILLIGRAM(S): at 14:21

## 2020-02-20 RX ADMIN — Medication 125 MILLILITER(S): at 11:34

## 2020-02-20 RX ADMIN — Medication 10 MILLIGRAM(S): at 05:21

## 2020-02-20 RX ADMIN — SODIUM CHLORIDE 3 MILLILITER(S): 9 INJECTION INTRAMUSCULAR; INTRAVENOUS; SUBCUTANEOUS at 05:22

## 2020-02-20 RX ADMIN — Medication 325 MILLIGRAM(S): at 11:51

## 2020-02-20 RX ADMIN — POLYETHYLENE GLYCOL 3350 17 GRAM(S): 17 POWDER, FOR SOLUTION ORAL at 11:51

## 2020-02-20 RX ADMIN — FENTANYL CITRATE 50 MICROGRAM(S): 50 INJECTION INTRAVENOUS at 06:38

## 2020-02-20 RX ADMIN — Medication 100 MILLIGRAM(S): at 17:55

## 2020-02-20 RX ADMIN — Medication 100 MILLIGRAM(S): at 00:02

## 2020-02-20 RX ADMIN — Medication 400 MILLIGRAM(S): at 09:51

## 2020-02-20 RX ADMIN — Medication 250 MILLIGRAM(S): at 08:42

## 2020-02-20 RX ADMIN — SODIUM CHLORIDE 1000 MILLILITER(S): 9 INJECTION, SOLUTION INTRAVENOUS at 11:33

## 2020-02-20 RX ADMIN — METHADONE HYDROCHLORIDE 10 MILLIGRAM(S): 40 TABLET ORAL at 17:55

## 2020-02-20 RX ADMIN — HYDROMORPHONE HYDROCHLORIDE 30 MILLILITER(S): 2 INJECTION INTRAMUSCULAR; INTRAVENOUS; SUBCUTANEOUS at 19:18

## 2020-02-20 RX ADMIN — HYDROMORPHONE HYDROCHLORIDE 30 MILLILITER(S): 2 INJECTION INTRAMUSCULAR; INTRAVENOUS; SUBCUTANEOUS at 19:57

## 2020-02-20 RX ADMIN — Medication 30 MILLIGRAM(S): at 21:40

## 2020-02-20 RX ADMIN — Medication 6.66 MICROGRAM(S)/KG/MIN: at 10:00

## 2020-02-20 RX ADMIN — Medication 30 MILLIGRAM(S): at 00:26

## 2020-02-20 RX ADMIN — ENOXAPARIN SODIUM 40 MILLIGRAM(S): 100 INJECTION SUBCUTANEOUS at 11:51

## 2020-02-20 RX ADMIN — MUPIROCIN 1 APPLICATION(S): 20 OINTMENT TOPICAL at 05:22

## 2020-02-20 RX ADMIN — Medication 125 MILLILITER(S): at 08:30

## 2020-02-20 RX ADMIN — Medication 250 MILLIGRAM(S): at 21:40

## 2020-02-20 RX ADMIN — Medication 30 MILLIGRAM(S): at 14:21

## 2020-02-20 RX ADMIN — DULOXETINE HYDROCHLORIDE 60 MILLIGRAM(S): 30 CAPSULE, DELAYED RELEASE ORAL at 06:46

## 2020-02-20 RX ADMIN — CHLORHEXIDINE GLUCONATE 1 APPLICATION(S): 213 SOLUTION TOPICAL at 05:11

## 2020-02-20 RX ADMIN — Medication 8.32 MICROGRAM(S)/KG/MIN: at 10:17

## 2020-02-20 RX ADMIN — DULOXETINE HYDROCHLORIDE 60 MILLIGRAM(S): 30 CAPSULE, DELAYED RELEASE ORAL at 17:56

## 2020-02-20 RX ADMIN — LAMOTRIGINE 150 MILLIGRAM(S): 25 TABLET, ORALLY DISINTEGRATING ORAL at 14:22

## 2020-02-20 RX ADMIN — Medication 100 MILLIEQUIVALENT(S): at 03:32

## 2020-02-20 NOTE — PROVIDER CONTACT NOTE (OTHER) - SITUATION
Patient day 1 post op aortic aneurysm repair and AVR Patient day 1 post op aortic aneurysm repair and AVR.

## 2020-02-20 NOTE — PHYSICAL THERAPY INITIAL EVALUATION ADULT - PERTINENT HX OF CURRENT PROBLEM, REHAB EVAL
Pt is a 70 yo F w/ PMH HTN, HLD, bipolar disorder, fibromyalgia, chronic pain due to OA BL knees & hips, SLE, RA, heart murmur due to moderate AS (follows up with cardiology). An incidental finding dilation of ascending aorta 5.1cm on CT scan and aortic/thoracic aneurysm. Had pre-op cardiac cath 11/5/19 (normal coronaries), however pt fell on L hip and suffered fracture, surgery postponed. Presented to PST today for scheduled Aortic Valve Replacement, Ascending Aortic Replacement on 2/20/20.

## 2020-02-20 NOTE — PROVIDER CONTACT NOTE (OTHER) - BACKGROUND
Patient on PCA pump. 0.3mg dilaudid demand dose. Patient on PCA pump. Patient received 0.5mg initial dose per order and gets 0.3mg dilaudid demand dose. Toradol, methadone standing orders.

## 2020-02-20 NOTE — AIRWAY REMOVAL NOTE  ADULT & PEDS - ARTIFICAL AIRWAY REMOVAL COMMENTS
Written order for extubation verified. The patient was identified by full name and birth date compared to the identification band. Present during the procedure was Zen Ulloa RN.

## 2020-02-20 NOTE — PHYSICAL THERAPY INITIAL EVALUATION ADULT - PLANNED THERAPY INTERVENTIONS, PT EVAL
1. GOAL: Pt will be able to negotiate 4 steps +HR independently with reciprocal pattern in 3 weeks./transfer training/gait training/bed mobility training

## 2020-02-20 NOTE — PHYSICAL THERAPY INITIAL EVALUATION ADULT - ADDITIONAL COMMENTS
Pt states she lives with her daughter and friends. There are 4 steps to enter with +handrail and none once inside. At baseline, pt ambulates with rolling walker. Of note, pt states she has leg length discrepancy (LLE shorter than RLE), and ambulates on LLE toes.

## 2020-02-20 NOTE — PHYSICAL THERAPY INITIAL EVALUATION ADULT - GAIT DEVIATIONS NOTED, PT EVAL
decreased step length/leg length dispreancy, step to gait pattern/decreased han/decreased weight-shifting ability

## 2020-02-20 NOTE — PROGRESS NOTE ADULT - SUBJECTIVE AND OBJECTIVE BOX
CRITICAL CARE ATTENDING - CTICU    MEDICATIONS  (STANDING):  aspirin enteric coated 325 milliGRAM(s) Oral daily  atorvastatin 40 milliGRAM(s) Oral at bedtime  cefuroxime  IVPB 1500 milliGRAM(s) IV Intermittent every 8 hours  chlorhexidine 0.12% Liquid 5 milliLiter(s) Oral Mucosa every 4 hours  chlorhexidine 2% Cloths 1 Application(s) Topical <User Schedule>  dexMEDEtomidine Infusion 0.3 MICROgram(s)/kG/Hr (6.66 mL/Hr) IV Continuous <Continuous>  dextrose 5%. 1000 milliLiter(s) (15 mL/Hr) IV Continuous <Continuous>  dextrose 50% Injectable 50 milliLiter(s) IV Push every 15 minutes  dextrose 50% Injectable 25 milliLiter(s) IV Push every 15 minutes  DOBUTamine Infusion 1.25 MICROgram(s)/kG/Min (3.33 mL/Hr) IV Continuous <Continuous>  DULoxetine 60 milliGRAM(s) Oral two times a day  HYDROmorphone PCA (1 mG/mL) 30 milliLiter(s) PCA Continuous PCA Continuous  insulin regular Infusion 3 Unit(s)/Hr (3 mL/Hr) IV Continuous <Continuous>  ketorolac   Injectable 30 milliGRAM(s) IV Push every 8 hours  levothyroxine 150 MICROGram(s) Oral daily  meperidine     Injectable 25 milliGRAM(s) IV Push once  methadone    Tablet 10 milliGRAM(s) Oral every 12 hours  metoclopramide Injectable 10 milliGRAM(s) IV Push every 8 hours  mupirocin 2% Ointment 1 Application(s) Topical every 12 hours  niCARdipine Infusion 5 mG/Hr (25 mL/Hr) IV Continuous <Continuous>  pantoprazole  Injectable 40 milliGRAM(s) IV Push daily  polyethylene glycol 3350 17 Gram(s) Oral daily  potassium chloride  10 mEq/50 mL IVPB 10 milliEquivalent(s) IV Intermittent every 1 hour  potassium chloride  10 mEq/50 mL IVPB 10 milliEquivalent(s) IV Intermittent every 1 hour  potassium chloride  10 mEq/50 mL IVPB 10 milliEquivalent(s) IV Intermittent every 1 hour  sodium chloride 0.9% lock flush 3 milliLiter(s) IV Push every 8 hours  sodium chloride 0.9%. 1000 milliLiter(s) (10 mL/Hr) IV Continuous <Continuous>  vancomycin  IVPB 1000 milliGRAM(s) IV Intermittent every 12 hours                                    9.0    6.84  )-----------( 145      ( 2020 00:22 )             27.7       0220    142  |  107  |  10  ----------------------------<  131<H>  4.1   |  23  |  0.46<L>    Ca    8.3<L>      2020 00:22  Phos  3.3       Mg     2.4         TPro  5.8<L>  /  Alb  4.4  /  TBili  0.5  /  DBili  x   /  AST  26  /  ALT  10  /  AlkPhos  37<L>        PT/INR - ( 2020 14:42 )   PT: 12.2 sec;   INR: 1.07 ratio         PTT - ( 2020 14:42 )  PTT:29.7 sec    Mode: CPAP with PS  FiO2: 50  PEEP: 5  PS: 5  MAP: 8  PIP: 15      Daily     Daily Weight in k.9 (2020 00:00)       @ 07:01  -   @ 07:00  --------------------------------------------------------  IN: 5980.7 mL / OUT: 6080 mL / NET: -99.3 mL        Critically Ill patient  : [ ] preoperative ,   [x ] post operative    Requires :  [x ] Arterial Line   [x] Central Line  [ ] PA catheter  [ ] IABP  [ ] ECMO  [ ] LVAD  [ ] Ventilator  [ ] pacemaker [ ] Impella.                      [ x] ABG's     [x ] Pulse Oxymetry Monitoring  Bedside evaluation , monitoring , treatment of hemodynamics , fluids , IVP/ IVCD meds.        Diagnosis:     POD 1 - AVR, Ascending aorta replacement     hx of SLE     Moderate AS     Requires chest PT, pulmonary toilet, suctioning to maintain SaO2,  patent airway and treat atelectasis.     Hemodynamic lability,  instability. Requires IVCD [ ] vasopressors [x ] inotropes  [ x] vasodilator  [ ]IVSS fluid  to maintain MAP, perfusion, C.I.     IVCD insulin         By signing my name below, I, Araseli Ramirez, attest that this documentation has been prepared under the direction and in the presence of Brian Rogers MD.   Electronically Signed: Iain Segovia. 20 @ 07:06    Discussed with CT surgeon, Physician's Assistant - Nurse Practitioner- Critical care medicine team.   Dicussed at  AM / PM rounds.   Chart, labs , films reviewed.    Total Time: CRITICAL CARE ATTENDING - CTICU    MEDICATIONS  (STANDING):  aspirin enteric coated 325 milliGRAM(s) Oral daily  atorvastatin 40 milliGRAM(s) Oral at bedtime  cefuroxime  IVPB 1500 milliGRAM(s) IV Intermittent every 8 hours  chlorhexidine 0.12% Liquid 5 milliLiter(s) Oral Mucosa every 4 hours  chlorhexidine 2% Cloths 1 Application(s) Topical <User Schedule>  dexMEDEtomidine Infusion 0.3 MICROgram(s)/kG/Hr (6.66 mL/Hr) IV Continuous <Continuous>  dextrose 5%. 1000 milliLiter(s) (15 mL/Hr) IV Continuous <Continuous>  dextrose 50% Injectable 50 milliLiter(s) IV Push every 15 minutes  dextrose 50% Injectable 25 milliLiter(s) IV Push every 15 minutes  DOBUTamine Infusion 1.25 MICROgram(s)/kG/Min (3.33 mL/Hr) IV Continuous <Continuous>  DULoxetine 60 milliGRAM(s) Oral two times a day  HYDROmorphone PCA (1 mG/mL) 30 milliLiter(s) PCA Continuous PCA Continuous  insulin regular Infusion 3 Unit(s)/Hr (3 mL/Hr) IV Continuous <Continuous>  ketorolac   Injectable 30 milliGRAM(s) IV Push every 8 hours  levothyroxine 150 MICROGram(s) Oral daily  meperidine     Injectable 25 milliGRAM(s) IV Push once  methadone    Tablet 10 milliGRAM(s) Oral every 12 hours  metoclopramide Injectable 10 milliGRAM(s) IV Push every 8 hours  mupirocin 2% Ointment 1 Application(s) Topical every 12 hours  niCARdipine Infusion 5 mG/Hr (25 mL/Hr) IV Continuous <Continuous>  pantoprazole  Injectable 40 milliGRAM(s) IV Push daily  polyethylene glycol 3350 17 Gram(s) Oral daily  potassium chloride  10 mEq/50 mL IVPB 10 milliEquivalent(s) IV Intermittent every 1 hour  potassium chloride  10 mEq/50 mL IVPB 10 milliEquivalent(s) IV Intermittent every 1 hour  potassium chloride  10 mEq/50 mL IVPB 10 milliEquivalent(s) IV Intermittent every 1 hour  sodium chloride 0.9% lock flush 3 milliLiter(s) IV Push every 8 hours  sodium chloride 0.9%. 1000 milliLiter(s) (10 mL/Hr) IV Continuous <Continuous>  vancomycin  IVPB 1000 milliGRAM(s) IV Intermittent every 12 hours                                    9.0    6.84  )-----------( 145      ( 2020 00:22 )             27.7       0220    142  |  107  |  10  ----------------------------<  131<H>  4.1   |  23  |  0.46<L>    Ca    8.3<L>      2020 00:22  Phos  3.3       Mg     2.4         TPro  5.8<L>  /  Alb  4.4  /  TBili  0.5  /  DBili  x   /  AST  26  /  ALT  10  /  AlkPhos  37<L>        PT/INR - ( 2020 14:42 )   PT: 12.2 sec;   INR: 1.07 ratio         PTT - ( 2020 14:42 )  PTT:29.7 sec    Mode: CPAP with PS  FiO2: 50  PEEP: 5  PS: 5  MAP: 8  PIP: 15      Daily     Daily Weight in k.9 (2020 00:00)       @ 07:01  -   @ 07:00  --------------------------------------------------------  IN: 5980.7 mL / OUT: 6080 mL / NET: -99.3 mL        Critically Ill patient  : [ ] preoperative ,   [x ] post operative    Requires :  [x ] Arterial Line   [x] Central Line  [ ] PA catheter  [ ] IABP  [ ] ECMO  [ ] LVAD  [ ] Ventilator  [ x] pacemaker [ ] Impella.                      [ x] ABG's     [x ] Pulse Oxymetry Monitoring  Bedside evaluation , monitoring , treatment of hemodynamics , fluids , IVP/ IVCD meds.        Diagnosis:     POD 1 - AVR / , Ascending aorta replacement     Hypotension a/w Hypertension, requiring intravenous medication.     Extubated this AM    hx of SLE     Moderate AS     Requires chest PT, pulmonary toilet, suctioning to maintain SaO2,  patent airway and treat atelectasis.     Hemodynamic lability,  instability. Requires IVCD [ ] vasopressors [x ] inotropes  [ x] vasodilator  [ ]IVSS fluid  to maintain MAP, perfusion, C.I.     IVCD insulin     CHF- acute [ x]   chronic [x ]    systolic [ x]   diatolic [ x]          - Echo- EF -             [ ] RV dysfunction          - Cxr-cardiomegally, edema          - Clinical-  [x ]inotropes   [ ]pressors   [xdiuresis   [ ]IABP   [ ]ECMO   [ ]LVAD   [ ]Respiratory Failure  [x] IVCD  vasodilator     ECG     Chest Tube Drainage     Temporary pacemaker (TPM) interrogation and setting.     Thrombocytopenia     Chronic Pain management     I, Brian Rogers, personally performed the services described in this documentation. All medical record entries made by the scribe were at my direction and in my presence. I have reviewed the chart and agree that the record reflects my personal performance and is accurate and complete.   Brian Rogers MD.                     -         By signing my name below, I, Araseli Ramirez, attest that this documentation has been prepared under the direction and in the presence of Brian Rogers MD.   Electronically Signed: Iain Segovia. 20 @ 07:06    Discussed with CT surgeon, Physician's Assistant - Nurse Practitioner- Critical care medicine team.   Dicussed at  AM / PM rounds.   Chart, labs , films reviewed.    Total Time: 30 min

## 2020-02-20 NOTE — PROVIDER CONTACT NOTE (OTHER) - ASSESSMENT
Patient appears lethargic, responds appropriately to orientation questions however is slurring words. Patient appears lethargic, responds appropriately to orientation questions and follows commands, pupils equal and reactive; however is slurring words. VSS, currently on levo for hypotension.

## 2020-02-21 LAB
ALBUMIN SERPL ELPH-MCNC: 3.7 G/DL — SIGNIFICANT CHANGE UP (ref 3.3–5)
ALP SERPL-CCNC: 39 U/L — LOW (ref 40–120)
ALT FLD-CCNC: 10 U/L — SIGNIFICANT CHANGE UP (ref 10–45)
ANION GAP SERPL CALC-SCNC: 10 MMOL/L — SIGNIFICANT CHANGE UP (ref 5–17)
AST SERPL-CCNC: 21 U/L — SIGNIFICANT CHANGE UP (ref 10–40)
BASOPHILS # BLD AUTO: 0.02 K/UL — SIGNIFICANT CHANGE UP (ref 0–0.2)
BASOPHILS NFR BLD AUTO: 0.2 % — SIGNIFICANT CHANGE UP (ref 0–2)
BILIRUB SERPL-MCNC: 0.6 MG/DL — SIGNIFICANT CHANGE UP (ref 0.2–1.2)
BUN SERPL-MCNC: 10 MG/DL — SIGNIFICANT CHANGE UP (ref 7–23)
CALCIUM SERPL-MCNC: 8.6 MG/DL — SIGNIFICANT CHANGE UP (ref 8.4–10.5)
CHLORIDE SERPL-SCNC: 108 MMOL/L — SIGNIFICANT CHANGE UP (ref 96–108)
CO2 SERPL-SCNC: 22 MMOL/L — SIGNIFICANT CHANGE UP (ref 22–31)
CREAT SERPL-MCNC: 0.56 MG/DL — SIGNIFICANT CHANGE UP (ref 0.5–1.3)
EOSINOPHIL # BLD AUTO: 0.06 K/UL — SIGNIFICANT CHANGE UP (ref 0–0.5)
EOSINOPHIL NFR BLD AUTO: 0.7 % — SIGNIFICANT CHANGE UP (ref 0–6)
GAS PNL BLDA: SIGNIFICANT CHANGE UP
GLUCOSE SERPL-MCNC: 121 MG/DL — HIGH (ref 70–99)
HCT VFR BLD CALC: 27.8 % — LOW (ref 34.5–45)
HCT VFR BLD CALC: 30.4 % — LOW (ref 34.5–45)
HGB BLD-MCNC: 8.9 G/DL — LOW (ref 11.5–15.5)
HGB BLD-MCNC: 9.4 G/DL — LOW (ref 11.5–15.5)
IMM GRANULOCYTES NFR BLD AUTO: 0.4 % — SIGNIFICANT CHANGE UP (ref 0–1.5)
LYMPHOCYTES # BLD AUTO: 1.1 K/UL — SIGNIFICANT CHANGE UP (ref 1–3.3)
LYMPHOCYTES # BLD AUTO: 12.2 % — LOW (ref 13–44)
MCHC RBC-ENTMCNC: 30.9 GM/DL — LOW (ref 32–36)
MCHC RBC-ENTMCNC: 31.8 PG — SIGNIFICANT CHANGE UP (ref 27–34)
MCHC RBC-ENTMCNC: 32 GM/DL — SIGNIFICANT CHANGE UP (ref 32–36)
MCHC RBC-ENTMCNC: 32.4 PG — SIGNIFICANT CHANGE UP (ref 27–34)
MCV RBC AUTO: 101.1 FL — HIGH (ref 80–100)
MCV RBC AUTO: 102.7 FL — HIGH (ref 80–100)
MONOCYTES # BLD AUTO: 1.22 K/UL — HIGH (ref 0–0.9)
MONOCYTES NFR BLD AUTO: 13.5 % — SIGNIFICANT CHANGE UP (ref 2–14)
NEUTROPHILS # BLD AUTO: 6.6 K/UL — SIGNIFICANT CHANGE UP (ref 1.8–7.4)
NEUTROPHILS NFR BLD AUTO: 73 % — SIGNIFICANT CHANGE UP (ref 43–77)
NRBC # BLD: 0 /100 WBCS — SIGNIFICANT CHANGE UP (ref 0–0)
NRBC # BLD: 0 /100 WBCS — SIGNIFICANT CHANGE UP (ref 0–0)
PLATELET # BLD AUTO: 146 K/UL — LOW (ref 150–400)
PLATELET # BLD AUTO: 169 K/UL — SIGNIFICANT CHANGE UP (ref 150–400)
POTASSIUM SERPL-MCNC: 4.7 MMOL/L — SIGNIFICANT CHANGE UP (ref 3.5–5.3)
POTASSIUM SERPL-SCNC: 4.7 MMOL/L — SIGNIFICANT CHANGE UP (ref 3.5–5.3)
PROT SERPL-MCNC: 5.7 G/DL — LOW (ref 6–8.3)
RBC # BLD: 2.75 M/UL — LOW (ref 3.8–5.2)
RBC # BLD: 2.96 M/UL — LOW (ref 3.8–5.2)
RBC # FLD: 14.7 % — HIGH (ref 10.3–14.5)
RBC # FLD: 14.9 % — HIGH (ref 10.3–14.5)
SODIUM SERPL-SCNC: 140 MMOL/L — SIGNIFICANT CHANGE UP (ref 135–145)
WBC # BLD: 12.76 K/UL — HIGH (ref 3.8–10.5)
WBC # BLD: 9.04 K/UL — SIGNIFICANT CHANGE UP (ref 3.8–10.5)
WBC # FLD AUTO: 12.76 K/UL — HIGH (ref 3.8–10.5)
WBC # FLD AUTO: 9.04 K/UL — SIGNIFICANT CHANGE UP (ref 3.8–10.5)

## 2020-02-21 PROCEDURE — 99291 CRITICAL CARE FIRST HOUR: CPT

## 2020-02-21 PROCEDURE — 71045 X-RAY EXAM CHEST 1 VIEW: CPT | Mod: 26

## 2020-02-21 RX ORDER — FUROSEMIDE 40 MG
40 TABLET ORAL DAILY
Refills: 0 | Status: DISCONTINUED | OUTPATIENT
Start: 2020-02-21 | End: 2020-02-28

## 2020-02-21 RX ORDER — METHADONE HYDROCHLORIDE 40 MG/1
5 TABLET ORAL EVERY 12 HOURS
Refills: 0 | Status: DISCONTINUED | OUTPATIENT
Start: 2020-02-21 | End: 2020-02-22

## 2020-02-21 RX ORDER — FUROSEMIDE 40 MG
20 TABLET ORAL ONCE
Refills: 0 | Status: COMPLETED | OUTPATIENT
Start: 2020-02-21 | End: 2020-02-21

## 2020-02-21 RX ORDER — SPIRONOLACTONE 25 MG/1
25 TABLET, FILM COATED ORAL EVERY 12 HOURS
Refills: 0 | Status: DISCONTINUED | OUTPATIENT
Start: 2020-02-21 | End: 2020-02-23

## 2020-02-21 RX ADMIN — DULOXETINE HYDROCHLORIDE 60 MILLIGRAM(S): 30 CAPSULE, DELAYED RELEASE ORAL at 18:30

## 2020-02-21 RX ADMIN — MUPIROCIN 1 APPLICATION(S): 20 OINTMENT TOPICAL at 18:31

## 2020-02-21 RX ADMIN — CHLORHEXIDINE GLUCONATE 1 APPLICATION(S): 213 SOLUTION TOPICAL at 06:31

## 2020-02-21 RX ADMIN — SODIUM CHLORIDE 3 MILLILITER(S): 9 INJECTION INTRAMUSCULAR; INTRAVENOUS; SUBCUTANEOUS at 21:55

## 2020-02-21 RX ADMIN — SODIUM CHLORIDE 3 MILLILITER(S): 9 INJECTION INTRAMUSCULAR; INTRAVENOUS; SUBCUTANEOUS at 14:16

## 2020-02-21 RX ADMIN — POLYETHYLENE GLYCOL 3350 17 GRAM(S): 17 POWDER, FOR SOLUTION ORAL at 14:14

## 2020-02-21 RX ADMIN — Medication 20 MILLIGRAM(S): at 11:13

## 2020-02-21 RX ADMIN — ATORVASTATIN CALCIUM 40 MILLIGRAM(S): 80 TABLET, FILM COATED ORAL at 22:13

## 2020-02-21 RX ADMIN — LAMOTRIGINE 150 MILLIGRAM(S): 25 TABLET, ORALLY DISINTEGRATING ORAL at 00:35

## 2020-02-21 RX ADMIN — METHADONE HYDROCHLORIDE 10 MILLIGRAM(S): 40 TABLET ORAL at 06:32

## 2020-02-21 RX ADMIN — Medication 30 MILLIGRAM(S): at 14:29

## 2020-02-21 RX ADMIN — Medication 10 MILLIGRAM(S): at 06:32

## 2020-02-21 RX ADMIN — METHADONE HYDROCHLORIDE 10 MILLIGRAM(S): 40 TABLET ORAL at 18:30

## 2020-02-21 RX ADMIN — SODIUM CHLORIDE 3 MILLILITER(S): 9 INJECTION INTRAMUSCULAR; INTRAVENOUS; SUBCUTANEOUS at 06:34

## 2020-02-21 RX ADMIN — HYDROMORPHONE HYDROCHLORIDE 30 MILLILITER(S): 2 INJECTION INTRAMUSCULAR; INTRAVENOUS; SUBCUTANEOUS at 07:09

## 2020-02-21 RX ADMIN — Medication 100 MILLIGRAM(S): at 00:35

## 2020-02-21 RX ADMIN — DULOXETINE HYDROCHLORIDE 60 MILLIGRAM(S): 30 CAPSULE, DELAYED RELEASE ORAL at 06:32

## 2020-02-21 RX ADMIN — SPIRONOLACTONE 25 MILLIGRAM(S): 25 TABLET, FILM COATED ORAL at 06:32

## 2020-02-21 RX ADMIN — Medication 30 MILLIGRAM(S): at 06:32

## 2020-02-21 RX ADMIN — MUPIROCIN 1 APPLICATION(S): 20 OINTMENT TOPICAL at 06:33

## 2020-02-21 RX ADMIN — Medication 10 MILLIGRAM(S): at 14:14

## 2020-02-21 RX ADMIN — Medication 40 MILLIGRAM(S): at 06:32

## 2020-02-21 RX ADMIN — LAMOTRIGINE 150 MILLIGRAM(S): 25 TABLET, ORALLY DISINTEGRATING ORAL at 14:15

## 2020-02-21 RX ADMIN — Medication 325 MILLIGRAM(S): at 14:13

## 2020-02-21 RX ADMIN — HYDROMORPHONE HYDROCHLORIDE 30 MILLILITER(S): 2 INJECTION INTRAMUSCULAR; INTRAVENOUS; SUBCUTANEOUS at 19:10

## 2020-02-21 RX ADMIN — Medication 1 MILLIGRAM(S): at 16:42

## 2020-02-21 RX ADMIN — HYDROMORPHONE HYDROCHLORIDE 30 MILLILITER(S): 2 INJECTION INTRAMUSCULAR; INTRAVENOUS; SUBCUTANEOUS at 09:13

## 2020-02-21 RX ADMIN — Medication 150 MICROGRAM(S): at 06:32

## 2020-02-21 RX ADMIN — SPIRONOLACTONE 25 MILLIGRAM(S): 25 TABLET, FILM COATED ORAL at 18:30

## 2020-02-21 RX ADMIN — Medication 30 MILLIGRAM(S): at 14:14

## 2020-02-21 RX ADMIN — PANTOPRAZOLE SODIUM 40 MILLIGRAM(S): 20 TABLET, DELAYED RELEASE ORAL at 14:13

## 2020-02-21 NOTE — PROGRESS NOTE ADULT - SUBJECTIVE AND OBJECTIVE BOX
Day 1 of Anesthesia Pain Management Service    SUBJECTIVE: Patient is doing well with IV PCA    Pain Scale Score:	[X] Refer to charted pain scores    THERAPY:    [ ] IV PCA Morphine		[ ] 5 mg/mL	[ ] 1 mg/mL  [X] IV PCA Hydromorphone	[ ] 5 mg/mL	[X] 1 mg/mL  [ ] IV PCA Fentanyl		[ ] 50 micrograms/mL    Demand dose: 0.25 mg     Lockout: 6 minutes   Continuous Rate: 0 mg/hr  4 Hour Limit: 6 mg    MEDICATIONS  (STANDING):  aspirin enteric coated 325 milliGRAM(s) Oral daily  atorvastatin 40 milliGRAM(s) Oral at bedtime  chlorhexidine 2% Cloths 1 Application(s) Topical <User Schedule>  dextrose 5%. 1000 milliLiter(s) (50 mL/Hr) IV Continuous <Continuous>  dextrose 50% Injectable 12.5 Gram(s) IV Push once  DULoxetine 60 milliGRAM(s) Oral two times a day  furosemide    Tablet 40 milliGRAM(s) Oral daily  HYDROmorphone PCA (1 mG/mL) 30 milliLiter(s) PCA Continuous PCA Continuous  insulin lispro (HumaLOG) corrective regimen sliding scale   SubCutaneous three times a day before meals  insulin lispro (HumaLOG) corrective regimen sliding scale   SubCutaneous at bedtime  ketorolac   Injectable 30 milliGRAM(s) IV Push every 8 hours  lamoTRIgine 150 milliGRAM(s) Oral every 12 hours  levothyroxine 150 MICROGram(s) Oral daily  methadone    Tablet 10 milliGRAM(s) Oral every 12 hours  metoclopramide Injectable 10 milliGRAM(s) IV Push every 8 hours  mupirocin 2% Ointment 1 Application(s) Topical every 12 hours  norepinephrine Infusion 0.05 MICROgram(s)/kG/Min (8.325 mL/Hr) IV Continuous <Continuous>  pantoprazole  Injectable 40 milliGRAM(s) IV Push daily  polyethylene glycol 3350 17 Gram(s) Oral daily  potassium chloride  10 mEq/50 mL IVPB 10 milliEquivalent(s) IV Intermittent every 1 hour  potassium chloride  10 mEq/50 mL IVPB 10 milliEquivalent(s) IV Intermittent every 1 hour  potassium chloride  10 mEq/50 mL IVPB 10 milliEquivalent(s) IV Intermittent every 1 hour  sodium chloride 0.9% lock flush 3 milliLiter(s) IV Push every 8 hours  sodium chloride 0.9%. 1000 milliLiter(s) (10 mL/Hr) IV Continuous <Continuous>  spironolactone 25 milliGRAM(s) Oral every 12 hours    MEDICATIONS  (PRN):  cyclobenzaprine 10 milliGRAM(s) Oral three times a day PRN Muscle Spasm  dextrose 40% Gel 15 Gram(s) Oral once PRN Blood Glucose LESS THAN 70 milliGRAM(s)/deciLiter  glucagon  Injectable 1 milliGRAM(s) IntraMuscular once PRN Glucose <70 milliGRAM(s)/deciLiter  HYDROmorphone PCA (1 mG/mL) Rescue Clinician Bolus 0.5 milliGRAM(s) IV Push every 15 minutes PRN for Pain Scale GREATER THAN 6  LORazepam     Tablet 1 milliGRAM(s) Oral every 8 hours PRN Anxiety  naloxone Injectable 0.1 milliGRAM(s) IV Push every 3 minutes PRN For ANY of the following changes in patient status:  A. RR LESS THAN 10 breaths per minute, B. Oxygen saturation LESS THAN 90%, C. Sedation score of 6  ondansetron Injectable 4 milliGRAM(s) IV Push every 6 hours PRN Nausea      OBJECTIVE:    Sedation Score:	[ X] Alert	[ ] Drowsy 	[ ] Arousable	[ ] Asleep	[ ] Unresponsive    Side Effects:	[X ] None	[ ] Nausea	[ ] Vomiting	[ ] Pruritus  		[ ] Other:    Vital Signs Last 24 Hrs  T(C): 37.6 (21 Feb 2020 08:00), Max: 37.7 (20 Feb 2020 20:00)  T(F): 99.7 (21 Feb 2020 08:00), Max: 99.9 (20 Feb 2020 20:00)  HR: 87 (21 Feb 2020 09:00) (75 - 92)  BP: --  BP(mean): --  RR: 7 (21 Feb 2020 09:00) (5 - 22)  SpO2: 98% (21 Feb 2020 09:00) (91% - 100%)    ASSESSMENT/ PLAN    Therapy to  be:               [X] Continued   [ ] Discontinued   [ ] Changed to PRN Analgesics    Documentation and Verification of current medications:   [X] Done	[ ] Not done, not eligible    Comments: Bolus decreased from 0.3 mg to 0.25 mg

## 2020-02-21 NOTE — DIETITIAN INITIAL EVALUATION ADULT. - ADD RECOMMEND
1. Provide food preferences as requested by Pt/family within diet restrictions  2. Encourage PO intake during meal times 3. Reviewed menu ordering procedures 4. Add ensure enlive x2 daily to supplement PO intake. 5. Increased nutrient needs diet education provided.

## 2020-02-21 NOTE — DIETITIAN INITIAL EVALUATION ADULT. - ENERGY NEEDS
Ht: 5'5", Wt: 195lbs, BMI: 6.5kg/m2, IBW: 125lbs(+/-10%). 156%IBW  Pertinent information: 69 year old female with PMHx of HTN, HLD, Bipolar, Fibromyalgia, OA, RA, SLE with known moderate AS. On routine follow up found with aortic aneurysm. POD 2 from AVR and Ascending aortic repair.   +1 generalize Edema, Skin intact

## 2020-02-21 NOTE — PROGRESS NOTE ADULT - SUBJECTIVE AND OBJECTIVE BOX
CRITICAL CARE ATTENDING - CTICU    MEDICATIONS  (STANDING):  aspirin enteric coated 325 milliGRAM(s) Oral daily  atorvastatin 40 milliGRAM(s) Oral at bedtime  chlorhexidine 2% Cloths 1 Application(s) Topical <User Schedule>  dextrose 5%. 1000 milliLiter(s) (50 mL/Hr) IV Continuous <Continuous>  dextrose 50% Injectable 12.5 Gram(s) IV Push once  DULoxetine 60 milliGRAM(s) Oral two times a day  enoxaparin Injectable 40 milliGRAM(s) SubCutaneous daily  furosemide    Tablet 40 milliGRAM(s) Oral daily  HYDROmorphone PCA (1 mG/mL) 30 milliLiter(s) PCA Continuous PCA Continuous  insulin lispro (HumaLOG) corrective regimen sliding scale   SubCutaneous three times a day before meals  insulin lispro (HumaLOG) corrective regimen sliding scale   SubCutaneous at bedtime  ketorolac   Injectable 30 milliGRAM(s) IV Push every 8 hours  lamoTRIgine 150 milliGRAM(s) Oral every 12 hours  levothyroxine 150 MICROGram(s) Oral daily  methadone    Tablet 10 milliGRAM(s) Oral every 12 hours  metoclopramide Injectable 10 milliGRAM(s) IV Push every 8 hours  mupirocin 2% Ointment 1 Application(s) Topical every 12 hours  norepinephrine Infusion 0.05 MICROgram(s)/kG/Min (8.325 mL/Hr) IV Continuous <Continuous>  pantoprazole  Injectable 40 milliGRAM(s) IV Push daily  polyethylene glycol 3350 17 Gram(s) Oral daily  potassium chloride  10 mEq/50 mL IVPB 10 milliEquivalent(s) IV Intermittent every 1 hour  potassium chloride  10 mEq/50 mL IVPB 10 milliEquivalent(s) IV Intermittent every 1 hour  potassium chloride  10 mEq/50 mL IVPB 10 milliEquivalent(s) IV Intermittent every 1 hour  sodium chloride 0.9% lock flush 3 milliLiter(s) IV Push every 8 hours  sodium chloride 0.9%. 1000 milliLiter(s) (10 mL/Hr) IV Continuous <Continuous>  spironolactone 25 milliGRAM(s) Oral every 12 hours                                    8.9    9.04  )-----------( 146      ( 21 Feb 2020 00:51 )             27.8       02-21    140  |  108  |  10  ----------------------------<  121<H>  4.7   |  22  |  0.56    Ca    8.6      21 Feb 2020 00:50  Phos  3.3     02-20  Mg     2.4     02-20    TPro  5.7<L>  /  Alb  3.7  /  TBili  0.6  /  DBili  x   /  AST  21  /  ALT  10  /  AlkPhos  39<L>  02-21      PT/INR - ( 20 Feb 2020 17:54 )   PT: 13.4 sec;   INR: 1.16 ratio         PTT - ( 20 Feb 2020 17:54 )  PTT:34.4 sec        Daily     Daily       02-20 @ 07:01  -  02-21 @ 07:00  --------------------------------------------------------  IN: 3580.3 mL / OUT: 1475 mL / NET: 2105.3 mL        Critically Ill patient  : [ ] preoperative ,   [x ] post operative    Requires :  [x ] Arterial Line   [x ] Central Line  [ ] PA catheter  [ ] IABP  [ ] ECMO  [ ] LVAD  [ ] Ventilator  [x ] pacemaker [ ] Impella.                      [ x] ABG's     [x ] Pulse Oxymetry Monitoring  Bedside evaluation , monitoring , treatment of hemodynamics , fluids , IVP/ IVCD meds.        Diagnosis:     POD 2 - AVR / , Ascending aorta replacement     Hypotension a/w Hypertension, requiring intravenous medication.     hx of SLE     Moderate AS     Requires chest PT, pulmonary toilet, suctioning to maintain SaO2,  patent airway and treat atelectasis.     Hemodynamic lability,  instability. Requires IVCD [x ] vasopressors [ ] inotropes  [ ] vasodilator  [ ]IVSS fluid  to maintain MAP, perfusion, C.I.     IVCD insulin     CHF- acute [ x]   chronic [x ]    systolic [ x]   diatolic [ x]          - Echo- EF -             [ ] RV dysfunction          - Cxr-cardiomegally, edema          - Clinical-  [ ]inotropes   [x ]pressors   [xdiuresis   [ ]IABP   [ ]ECMO   [ ]LVAD   [ ]Respiratory Failure  [x] IVCD  vasodilator     ECG     Chest Tube Drainage     Temporary pacemaker (TPM) interrogation and setting.     Thrombocytopenia     Chronic Pain management     Hypotension               By signing my name below, I, Araseli Ramirez, attest that this documentation has been prepared under the direction and in the presence of Brian Rogers MD.   Electronically Signed: Iain Segovia. 02-21-20 @ 07:36    Discussed with CT surgeon, Physician's Assistant - Nurse Practitioner- Critical care medicine team.   Dicussed at  AM / PM rounds.   Chart, labs , films reviewed.    Total Time: CRITICAL CARE ATTENDING - CTICU    MEDICATIONS  (STANDING):  aspirin enteric coated 325 milliGRAM(s) Oral daily  atorvastatin 40 milliGRAM(s) Oral at bedtime  chlorhexidine 2% Cloths 1 Application(s) Topical <User Schedule>  dextrose 5%. 1000 milliLiter(s) (50 mL/Hr) IV Continuous <Continuous>  dextrose 50% Injectable 12.5 Gram(s) IV Push once  DULoxetine 60 milliGRAM(s) Oral two times a day  enoxaparin Injectable 40 milliGRAM(s) SubCutaneous daily  furosemide    Tablet 40 milliGRAM(s) Oral daily  HYDROmorphone PCA (1 mG/mL) 30 milliLiter(s) PCA Continuous PCA Continuous  insulin lispro (HumaLOG) corrective regimen sliding scale   SubCutaneous three times a day before meals  insulin lispro (HumaLOG) corrective regimen sliding scale   SubCutaneous at bedtime  ketorolac   Injectable 30 milliGRAM(s) IV Push every 8 hours  lamoTRIgine 150 milliGRAM(s) Oral every 12 hours  levothyroxine 150 MICROGram(s) Oral daily  methadone    Tablet 10 milliGRAM(s) Oral every 12 hours  metoclopramide Injectable 10 milliGRAM(s) IV Push every 8 hours  mupirocin 2% Ointment 1 Application(s) Topical every 12 hours  norepinephrine Infusion 0.05 MICROgram(s)/kG/Min (8.325 mL/Hr) IV Continuous <Continuous>  pantoprazole  Injectable 40 milliGRAM(s) IV Push daily  polyethylene glycol 3350 17 Gram(s) Oral daily  potassium chloride  10 mEq/50 mL IVPB 10 milliEquivalent(s) IV Intermittent every 1 hour  potassium chloride  10 mEq/50 mL IVPB 10 milliEquivalent(s) IV Intermittent every 1 hour  potassium chloride  10 mEq/50 mL IVPB 10 milliEquivalent(s) IV Intermittent every 1 hour  sodium chloride 0.9% lock flush 3 milliLiter(s) IV Push every 8 hours  sodium chloride 0.9%. 1000 milliLiter(s) (10 mL/Hr) IV Continuous <Continuous>  spironolactone 25 milliGRAM(s) Oral every 12 hours                                    8.9    9.04  )-----------( 146      ( 21 Feb 2020 00:51 )             27.8       02-21    140  |  108  |  10  ----------------------------<  121<H>  4.7   |  22  |  0.56    Ca    8.6      21 Feb 2020 00:50  Phos  3.3     02-20  Mg     2.4     02-20    TPro  5.7<L>  /  Alb  3.7  /  TBili  0.6  /  DBili  x   /  AST  21  /  ALT  10  /  AlkPhos  39<L>  02-21      PT/INR - ( 20 Feb 2020 17:54 )   PT: 13.4 sec;   INR: 1.16 ratio         PTT - ( 20 Feb 2020 17:54 )  PTT:34.4 sec        Daily     Daily       02-20 @ 07:01  -  02-21 @ 07:00  --------------------------------------------------------  IN: 3580.3 mL / OUT: 1475 mL / NET: 2105.3 mL        Critically Ill patient  : [ ] preoperative ,   [x ] post operative    Requires :  [x ] Arterial Line   [x ] Central Line  [ ] PA catheter  [ ] IABP  [ ] ECMO  [ ] LVAD  [ ] Ventilator  [x ] pacemaker [ ] Impella.                      [ x] ABG's     [x ] Pulse Oxymetry Monitoring  Bedside evaluation , monitoring , treatment of hemodynamics , fluids , IVP/ IVCD meds.        Diagnosis:     POD 2 - AVR / , Ascending aorta replacement     Hypotension a/w Hypertension, requiring intravenous medication.     hx of SLE     Moderate AS     Requires chest PT, pulmonary toilet, suctioning to maintain SaO2,  patent airway and treat atelectasis.     Hemodynamic lability,  instability. Requires IVCD [x ] vasopressors [ ] inotropes  [ ] vasodilator  [ x]IVSS fluid  to maintain MAP, perfusion, C.I.     IVCD insulin     CHF- acute [ x]   chronic [x ]    systolic [ x]   diatolic [ x]          - Echo- EF -             [ ] RV dysfunction          - Cxr-cardiomegally, edema          - Clinical-  [ ]inotropes   [x ]pressors   [xdiuresis   [ ]IABP   [ ]ECMO   [ ]LVAD   [ ]Respiratory Failure  [x] IVCD  vasodilator     ECG     Chest Tube Drainage     Temporary pacemaker (TPM) interrogation and setting.     Thrombocytopenia     Chronic Pain management     Hypotension     I, Brian Rogers, personally performed the services described in this documentation. All medical record entries made by the scribe were at my direction and in my presence. I have reviewed the chart and agree that the record reflects my personal performance and is accurate and complete.   Brian Rogers MD.               By signing my name below, I, Araseli Ramirez, attest that this documentation has been prepared under the direction and in the presence of Brian Rogers MD.   Electronically Signed: Iain Segovia. 02-21-20 @ 07:36    Discussed with CT surgeon, Physician's Assistant - Nurse Practitioner- Critical care medicine team.   Dicussed at  AM / PM rounds.   Chart, labs , films reviewed.    Total Time: 30 min

## 2020-02-21 NOTE — DIETITIAN INITIAL EVALUATION ADULT. - OTHER INFO
Pt seen for LOS. Limited participation in RD interview as Pt with chronic pain and receiving pain medications.   Pt reports a fair appetite at home, states daughter does cooking. Consumes often only 1 meal a day; salad, chicken, rice. Drinks water and diet soda. Snacks on hard boiled eggs.  Pt reports UBW to be between 180-195lbs. States she sometimes loses weight while dieting and then will gain it back. Reports admitted weight of 195lbs to be accurate. Pt is currently 213lbs, this increase likely related to intraoperative fluids.   Pt was briefly amenable to increased nutrient needs education. Reviewed increased requirements for post op wound healing. Pt willing to accept ensure enlive daily to help supplement intake.  Pt reports chronic constipation for which she takes "vegetables pills" for. Pt takes Milk thistle and vitamin C PTA. Denies chewing/swallowing difficulty. NKFA

## 2020-02-22 DIAGNOSIS — Z95.2 PRESENCE OF PROSTHETIC HEART VALVE: ICD-10-CM

## 2020-02-22 DIAGNOSIS — I48.91 UNSPECIFIED ATRIAL FIBRILLATION: ICD-10-CM

## 2020-02-22 LAB
GAS PNL BLDA: SIGNIFICANT CHANGE UP
GAS PNL BLDA: SIGNIFICANT CHANGE UP
HCT VFR BLD CALC: 27.9 % — LOW (ref 34.5–45)
HGB BLD-MCNC: 8.7 G/DL — LOW (ref 11.5–15.5)
MCHC RBC-ENTMCNC: 31.2 GM/DL — LOW (ref 32–36)
MCHC RBC-ENTMCNC: 31.2 PG — SIGNIFICANT CHANGE UP (ref 27–34)
MCV RBC AUTO: 100 FL — SIGNIFICANT CHANGE UP (ref 80–100)
NRBC # BLD: 0 /100 WBCS — SIGNIFICANT CHANGE UP (ref 0–0)
PLATELET # BLD AUTO: 140 K/UL — LOW (ref 150–400)
RBC # BLD: 2.79 M/UL — LOW (ref 3.8–5.2)
RBC # FLD: 14.6 % — HIGH (ref 10.3–14.5)
TSH SERPL-MCNC: 0.3 UIU/ML — SIGNIFICANT CHANGE UP (ref 0.27–4.2)
WBC # BLD: 8.41 K/UL — SIGNIFICANT CHANGE UP (ref 3.8–10.5)
WBC # FLD AUTO: 8.41 K/UL — SIGNIFICANT CHANGE UP (ref 3.8–10.5)

## 2020-02-22 PROCEDURE — 99233 SBSQ HOSP IP/OBS HIGH 50: CPT

## 2020-02-22 PROCEDURE — 71045 X-RAY EXAM CHEST 1 VIEW: CPT | Mod: 26

## 2020-02-22 RX ORDER — FUROSEMIDE 40 MG
20 TABLET ORAL ONCE
Refills: 0 | Status: COMPLETED | OUTPATIENT
Start: 2020-02-22 | End: 2020-02-22

## 2020-02-22 RX ORDER — AMIODARONE HYDROCHLORIDE 400 MG/1
400 TABLET ORAL EVERY 8 HOURS
Refills: 0 | Status: COMPLETED | OUTPATIENT
Start: 2020-02-22 | End: 2020-02-25

## 2020-02-22 RX ORDER — ENOXAPARIN SODIUM 100 MG/ML
40 INJECTION SUBCUTANEOUS DAILY
Refills: 0 | Status: DISCONTINUED | OUTPATIENT
Start: 2020-02-22 | End: 2020-02-23

## 2020-02-22 RX ORDER — METOPROLOL TARTRATE 50 MG
50 TABLET ORAL
Refills: 0 | Status: DISCONTINUED | OUTPATIENT
Start: 2020-02-22 | End: 2020-02-23

## 2020-02-22 RX ORDER — AMIODARONE HYDROCHLORIDE 400 MG/1
150 TABLET ORAL ONCE
Refills: 0 | Status: COMPLETED | OUTPATIENT
Start: 2020-02-22 | End: 2020-02-22

## 2020-02-22 RX ORDER — METOPROLOL TARTRATE 50 MG
50 TABLET ORAL ONCE
Refills: 0 | Status: COMPLETED | OUTPATIENT
Start: 2020-02-22 | End: 2020-02-22

## 2020-02-22 RX ORDER — POTASSIUM CHLORIDE 20 MEQ
10 PACKET (EA) ORAL ONCE
Refills: 0 | Status: DISCONTINUED | OUTPATIENT
Start: 2020-02-22 | End: 2020-02-22

## 2020-02-22 RX ORDER — OXYCODONE HYDROCHLORIDE 5 MG/1
10 TABLET ORAL EVERY 4 HOURS
Refills: 0 | Status: DISCONTINUED | OUTPATIENT
Start: 2020-02-22 | End: 2020-02-29

## 2020-02-22 RX ORDER — MAGNESIUM SULFATE 500 MG/ML
2 VIAL (ML) INJECTION ONCE
Refills: 0 | Status: COMPLETED | OUTPATIENT
Start: 2020-02-22 | End: 2020-02-22

## 2020-02-22 RX ORDER — ACETAMINOPHEN 500 MG
650 TABLET ORAL EVERY 6 HOURS
Refills: 0 | Status: DISCONTINUED | OUTPATIENT
Start: 2020-02-22 | End: 2020-03-01

## 2020-02-22 RX ORDER — AMIODARONE HYDROCHLORIDE 400 MG/1
200 TABLET ORAL DAILY
Refills: 0 | Status: DISCONTINUED | OUTPATIENT
Start: 2020-02-26 | End: 2020-02-28

## 2020-02-22 RX ORDER — SORBITOL SOLUTION 70 %
30 SOLUTION, ORAL MISCELLANEOUS ONCE
Refills: 0 | Status: DISCONTINUED | OUTPATIENT
Start: 2020-02-22 | End: 2020-03-01

## 2020-02-22 RX ORDER — AMIODARONE HYDROCHLORIDE 400 MG/1
TABLET ORAL
Refills: 0 | Status: DISCONTINUED | OUTPATIENT
Start: 2020-02-22 | End: 2020-02-22

## 2020-02-22 RX ORDER — POTASSIUM CHLORIDE 20 MEQ
20 PACKET (EA) ORAL ONCE
Refills: 0 | Status: COMPLETED | OUTPATIENT
Start: 2020-02-22 | End: 2020-02-22

## 2020-02-22 RX ORDER — METOPROLOL TARTRATE 50 MG
2.5 TABLET ORAL ONCE
Refills: 0 | Status: COMPLETED | OUTPATIENT
Start: 2020-02-22 | End: 2020-02-22

## 2020-02-22 RX ORDER — AMIODARONE HYDROCHLORIDE 400 MG/1
TABLET ORAL
Refills: 0 | Status: DISCONTINUED | OUTPATIENT
Start: 2020-02-22 | End: 2020-02-28

## 2020-02-22 RX ADMIN — SPIRONOLACTONE 25 MILLIGRAM(S): 25 TABLET, FILM COATED ORAL at 18:44

## 2020-02-22 RX ADMIN — MUPIROCIN 1 APPLICATION(S): 20 OINTMENT TOPICAL at 18:30

## 2020-02-22 RX ADMIN — AMIODARONE HYDROCHLORIDE 600 MILLIGRAM(S): 400 TABLET ORAL at 08:44

## 2020-02-22 RX ADMIN — LAMOTRIGINE 150 MILLIGRAM(S): 25 TABLET, ORALLY DISINTEGRATING ORAL at 00:57

## 2020-02-22 RX ADMIN — Medication 150 MICROGRAM(S): at 05:30

## 2020-02-22 RX ADMIN — AMIODARONE HYDROCHLORIDE 600 MILLIGRAM(S): 400 TABLET ORAL at 10:27

## 2020-02-22 RX ADMIN — OXYCODONE HYDROCHLORIDE 10 MILLIGRAM(S): 5 TABLET ORAL at 04:20

## 2020-02-22 RX ADMIN — SPIRONOLACTONE 25 MILLIGRAM(S): 25 TABLET, FILM COATED ORAL at 05:24

## 2020-02-22 RX ADMIN — AMIODARONE HYDROCHLORIDE 400 MILLIGRAM(S): 400 TABLET ORAL at 14:07

## 2020-02-22 RX ADMIN — LAMOTRIGINE 150 MILLIGRAM(S): 25 TABLET, ORALLY DISINTEGRATING ORAL at 14:07

## 2020-02-22 RX ADMIN — ENOXAPARIN SODIUM 40 MILLIGRAM(S): 100 INJECTION SUBCUTANEOUS at 11:16

## 2020-02-22 RX ADMIN — Medication 40 MILLIGRAM(S): at 05:24

## 2020-02-22 RX ADMIN — SODIUM CHLORIDE 3 MILLILITER(S): 9 INJECTION INTRAMUSCULAR; INTRAVENOUS; SUBCUTANEOUS at 13:24

## 2020-02-22 RX ADMIN — CHLORHEXIDINE GLUCONATE 1 APPLICATION(S): 213 SOLUTION TOPICAL at 05:24

## 2020-02-22 RX ADMIN — OXYCODONE HYDROCHLORIDE 10 MILLIGRAM(S): 5 TABLET ORAL at 17:50

## 2020-02-22 RX ADMIN — PANTOPRAZOLE SODIUM 40 MILLIGRAM(S): 20 TABLET, DELAYED RELEASE ORAL at 11:16

## 2020-02-22 RX ADMIN — DULOXETINE HYDROCHLORIDE 60 MILLIGRAM(S): 30 CAPSULE, DELAYED RELEASE ORAL at 05:24

## 2020-02-22 RX ADMIN — AMIODARONE HYDROCHLORIDE 400 MILLIGRAM(S): 400 TABLET ORAL at 21:01

## 2020-02-22 RX ADMIN — Medication 50 MILLIGRAM(S): at 09:19

## 2020-02-22 RX ADMIN — OXYCODONE HYDROCHLORIDE 10 MILLIGRAM(S): 5 TABLET ORAL at 09:00

## 2020-02-22 RX ADMIN — MUPIROCIN 1 APPLICATION(S): 20 OINTMENT TOPICAL at 05:25

## 2020-02-22 RX ADMIN — AMIODARONE HYDROCHLORIDE 400 MILLIGRAM(S): 400 TABLET ORAL at 11:11

## 2020-02-22 RX ADMIN — OXYCODONE HYDROCHLORIDE 10 MILLIGRAM(S): 5 TABLET ORAL at 08:30

## 2020-02-22 RX ADMIN — Medication 50 MILLIGRAM(S): at 18:28

## 2020-02-22 RX ADMIN — POLYETHYLENE GLYCOL 3350 17 GRAM(S): 17 POWDER, FOR SOLUTION ORAL at 11:16

## 2020-02-22 RX ADMIN — DULOXETINE HYDROCHLORIDE 60 MILLIGRAM(S): 30 CAPSULE, DELAYED RELEASE ORAL at 18:29

## 2020-02-22 RX ADMIN — OXYCODONE HYDROCHLORIDE 10 MILLIGRAM(S): 5 TABLET ORAL at 04:50

## 2020-02-22 RX ADMIN — Medication 0.5 MILLIGRAM(S): at 21:03

## 2020-02-22 RX ADMIN — Medication 20 MILLIGRAM(S): at 02:03

## 2020-02-22 RX ADMIN — Medication 2.5 MILLIGRAM(S): at 08:44

## 2020-02-22 RX ADMIN — Medication 0.5 MILLIGRAM(S): at 06:25

## 2020-02-22 RX ADMIN — Medication 325 MILLIGRAM(S): at 11:16

## 2020-02-22 RX ADMIN — Medication 20 MILLIEQUIVALENT(S): at 09:20

## 2020-02-22 RX ADMIN — SODIUM CHLORIDE 3 MILLILITER(S): 9 INJECTION INTRAMUSCULAR; INTRAVENOUS; SUBCUTANEOUS at 21:00

## 2020-02-22 RX ADMIN — SODIUM CHLORIDE 3 MILLILITER(S): 9 INJECTION INTRAMUSCULAR; INTRAVENOUS; SUBCUTANEOUS at 05:26

## 2020-02-22 RX ADMIN — ATORVASTATIN CALCIUM 40 MILLIGRAM(S): 80 TABLET, FILM COATED ORAL at 21:01

## 2020-02-22 RX ADMIN — Medication 50 GRAM(S): at 09:02

## 2020-02-22 RX ADMIN — OXYCODONE HYDROCHLORIDE 10 MILLIGRAM(S): 5 TABLET ORAL at 17:20

## 2020-02-22 NOTE — PROGRESS NOTE ADULT - SUBJECTIVE AND OBJECTIVE BOX
Subjective: Pt states "I'm OK." Denies any CP, SOB, N/V and palpitations. No acute events overnight.     VITAL SIGNS  Telemetry: A-fib 80s    Vital Signs Last 24 Hrs  T(C): 37 (20 @ 19:37), Max: 37.8 (20 @ 23:00)  T(F): 98.6 (20 @ 19:37), Max: 100 (20 @ 23:00)  HR: 90 (20 @ 19:37) (82 - 102)  BP: 113/56 (20 @ 19:37) (110/62 - 113/56)  RR: 18 (20 @ 19:37) (8 - 23)  SpO2: 89% (20 @ 19:37) (89% - 100%)             07:01  -   @ 07:00  --------------------------------------------------------  IN: 1012 mL / OUT: 1868 mL / NET: -856 mL     07:01  -   @ 21:39  --------------------------------------------------------  IN: 740 mL / OUT: 675 mL / NET: 65 mL    Daily Weight in k.8 (2020 03:00)    Bilirubin Total, Serum: 0.4 mg/dL ( @ 00:47)    CAPILLARY BLOOD GLUCOSE  POCT Blood Glucose.: 110 mg/dL (2020 17:00)  POCT Blood Glucose.: 108 mg/dL (2020 08:17)  POCT Blood Glucose.: 111 mg/dL (2020 22:13)        MEDICATIONS  acetaminophen   Tablet .. 650 milliGRAM(s) Oral every 6 hours PRN  aMIOdarone    Tablet   Oral   aMIOdarone    Tablet 400 milliGRAM(s) Oral every 8 hours  aspirin enteric coated 325 milliGRAM(s) Oral daily  atorvastatin 40 milliGRAM(s) Oral at bedtime  chlorhexidine 2% Cloths 1 Application(s) Topical <User Schedule>  cyclobenzaprine 10 milliGRAM(s) Oral three times a day PRN  dextrose 5%. 1000 milliLiter(s) IV Continuous <Continuous>  DULoxetine 60 milliGRAM(s) Oral two times a day  enoxaparin Injectable 40 milliGRAM(s) SubCutaneous daily  furosemide    Tablet 40 milliGRAM(s) Oral daily  insulin lispro (HumaLOG) corrective regimen sliding scale   SubCutaneous three times a day before meals  insulin lispro (HumaLOG) corrective regimen sliding scale   SubCutaneous at bedtime  lamoTRIgine 150 milliGRAM(s) Oral every 12 hours  levothyroxine 150 MICROGram(s) Oral daily  LORazepam     Tablet 0.5 milliGRAM(s) Oral every 8 hours PRN  metoprolol tartrate 50 milliGRAM(s) Oral two times a day  mupirocin 2% Ointment 1 Application(s) Topical every 12 hours  ondansetron Injectable 4 milliGRAM(s) IV Push every 6 hours PRN  oxyCODONE    IR 10 milliGRAM(s) Oral every 4 hours PRN  pantoprazole  Injectable 40 milliGRAM(s) IV Push daily  polyethylene glycol 3350 17 Gram(s) Oral daily  sodium chloride 0.9% lock flush 3 milliLiter(s) IV Push every 8 hours  sodium chloride 0.9%. 1000 milliLiter(s) IV Continuous <Continuous>  spironolactone 25 milliGRAM(s) Oral every 12 hours    PHYSICAL EXAM  Neurology: A&Ox3, nonfocal, no gross deficits  CV : irregularly irregular, +S1S2  Sternal Wound : Mid-sternal incision with dressing-CDI, stable sternum  PW: VVI 45/12  Lungs: Respirations non-labored, diminished in the bases  Abdomen: Soft, NT/ND, +BSx4Q, last BM pre-op, (-)N/V/D  : Voiding without difficulty, bladder non-distended   Extremities: B/L LE edema, negative calf tenderness, +PP , SVG incision      LABS      135  |  103  |  15  ----------------------------<  126<H>  4.5   |  23  |  0.76    Ca    8.9      2020 00:47  Phos  3.0       Mg     2.1         TPro  6.0  /  Alb  3.7  /  TBili  0.4  /  DBili  x   /  AST  15  /  ALT  7<L>  /  AlkPhos  42                                   8.7    8.41  )-----------( 140      ( 2020 09:13 )             27.9          PT/INR - ( 2020 00:47 )   PT: 12.5 sec;   INR: 1.09 ratio         PTT - ( 2020 00:47 )  PTT:28.7 sec       PAST MEDICAL & SURGICAL HISTORY:  H/O aortic aneurysm: and thoracic aortic aneurysm  Moderate aortic stenosis  Subacute systemic lupus erythematosus  Cataract  Hypothyroid  Hip dislocation, left: avascular necrosis  Fibromyalgia  Uterine cancer  Spinal stenosis of lumbar region  Bipolar depression  HLD (hyperlipidemia)  HTN (hypertension)  H/O tubal ligation  History of cataract surgery  H/O total hysterectomy  H/O: : x2  History of hip surgery       Physical Therapy Rec:   Home  [  ]   Home w/ PT  [  ]  Rehab  [  ]    Discussed with CT Surgery attending. Subjective: Pt states "I'm OK." Denies any CP, SOB, N/V and palpitations. No acute events overnight.     VITAL SIGNS  Telemetry: A-fib 80s    Vital Signs Last 24 Hrs  T(C): 37 (20 @ 19:37), Max: 37.8 (20 @ 23:00)  T(F): 98.6 (20 @ 19:37), Max: 100 (20 @ 23:00)  HR: 90 (20 @ 19:37) (82 - 102)  BP: 113/56 (20 @ 19:37) (110/62 - 113/56)  RR: 18 (20 @ 19:37) (8 - 23)  SpO2: 89% (20 @ 19:37) (89% - 100%)             07:01  -   @ 07:00  --------------------------------------------------------  IN: 1012 mL / OUT: 1868 mL / NET: -856 mL     07:01  -   @ 21:39  --------------------------------------------------------  IN: 740 mL / OUT: 675 mL / NET: 65 mL    Daily Weight in k.8 (2020 03:00)    Bilirubin Total, Serum: 0.4 mg/dL ( @ 00:47)    CAPILLARY BLOOD GLUCOSE  POCT Blood Glucose.: 110 mg/dL (2020 17:00)  POCT Blood Glucose.: 108 mg/dL (2020 08:17)  POCT Blood Glucose.: 111 mg/dL (2020 22:13)        MEDICATIONS  acetaminophen   Tablet .. 650 milliGRAM(s) Oral every 6 hours PRN  aMIOdarone    Tablet   Oral   aMIOdarone    Tablet 400 milliGRAM(s) Oral every 8 hours  aspirin enteric coated 325 milliGRAM(s) Oral daily  atorvastatin 40 milliGRAM(s) Oral at bedtime  chlorhexidine 2% Cloths 1 Application(s) Topical <User Schedule>  cyclobenzaprine 10 milliGRAM(s) Oral three times a day PRN  dextrose 5%. 1000 milliLiter(s) IV Continuous <Continuous>  DULoxetine 60 milliGRAM(s) Oral two times a day  enoxaparin Injectable 40 milliGRAM(s) SubCutaneous daily  furosemide    Tablet 40 milliGRAM(s) Oral daily  insulin lispro (HumaLOG) corrective regimen sliding scale   SubCutaneous three times a day before meals  insulin lispro (HumaLOG) corrective regimen sliding scale   SubCutaneous at bedtime  lamoTRIgine 150 milliGRAM(s) Oral every 12 hours  levothyroxine 150 MICROGram(s) Oral daily  LORazepam     Tablet 0.5 milliGRAM(s) Oral every 8 hours PRN  metoprolol tartrate 50 milliGRAM(s) Oral two times a day  mupirocin 2% Ointment 1 Application(s) Topical every 12 hours  ondansetron Injectable 4 milliGRAM(s) IV Push every 6 hours PRN  oxyCODONE    IR 10 milliGRAM(s) Oral every 4 hours PRN  pantoprazole  Injectable 40 milliGRAM(s) IV Push daily  polyethylene glycol 3350 17 Gram(s) Oral daily  sodium chloride 0.9% lock flush 3 milliLiter(s) IV Push every 8 hours  sodium chloride 0.9%. 1000 milliLiter(s) IV Continuous <Continuous>  spironolactone 25 milliGRAM(s) Oral every 12 hours    PHYSICAL EXAM  Neurology: A&Ox3, nonfocal, no gross deficits  CV : irregularly irregular, +S1S2  Sternal Wound : Mid-sternal incision with dressing-CDI, stable sternum  PW: VVI 45/12  Lungs: Respirations non-labored, diminished in the bases, on O2 via NC  Abdomen: Soft, NT/ND, +BSx4Q, last BM pre-op, (-)N/V/D  : Voiding without difficulty, bladder non-distended   Extremities: 1-2+ B/L LE edema, negative calf tenderness, +PP     LABS      135  |  103  |  15  ----------------------------<  126<H>  4.5   |  23  |  0.76    Ca    8.9      2020 00:47  Phos  3.0       Mg     2.1         TPro  6.0  /  Alb  3.7  /  TBili  0.4  /  DBili  x   /  AST  15  /  ALT  7<L>  /  AlkPhos  42                               8.7    8.41  )-----------( 140      ( 2020 09:13 )             27.9          PT/INR - ( 2020 00:47 )   PT: 12.5 sec;   INR: 1.09 ratio    PTT - ( 2020 00:47 )  PTT:28.7 sec    PAST MEDICAL & SURGICAL HISTORY:  H/O aortic aneurysm: and thoracic aortic aneurysm  Moderate aortic stenosis  Subacute systemic lupus erythematosus  Cataract  Hypothyroid  Hip dislocation, left: avascular necrosis  Fibromyalgia  Uterine cancer  Spinal stenosis of lumbar region  Bipolar depression  HLD (hyperlipidemia)  HTN (hypertension)  H/O tubal ligation  History of cataract surgery  H/O total hysterectomy  H/O: : x2  History of hip surgery     Physical Therapy Rec:   Home  [ X ]   Home w/ PT  [  ]  Rehab  [  ]    Discussed with CT Surgery team.

## 2020-02-22 NOTE — PROGRESS NOTE ADULT - PROBLEM SELECTOR PLAN 1
s/p AVR and ascending aorta replacement   Pain management--chronic pain pt, on oxycodone and tylenol with positive results, no longer on methadone   Chest PT and incentive spirometry  Increase ambulation and activity   Shower POD 5  DVT and GI prophylaxis   Discharge planning-home when stable

## 2020-02-22 NOTE — PROGRESS NOTE ADULT - PROBLEM SELECTOR PLAN 2
New onset of ROBER on 2/22--rate controlled in the 80s   On lopressor and Amio load initiated  continue to monitor on tele

## 2020-02-22 NOTE — PROGRESS NOTE ADULT - ASSESSMENT
68 yo female, PMH HTN, HLD, bipolar disorder, fibromyalgia, hypothyroid, chronic pain due to OA bilateral knees and hips, SLE, RA, known moderate AS, incidental finding dilation of ascending aorta 5.1cm on CT scan and aortic/thoracic aneurysm, had pre-op cardiac cath 11/5/19 (normal coronaries), pt. feel on left hip and suffered fracture, and surgery postponed. Pt. presents for scheduled Aortic Valve Replacement, Ascending Aortic Replacement.   2/19: AVR (tissue) and ascending aorta replacement with Dr. Alvares. Received intra-op products: 1 Cryo, 1 platelet, 1000 FEIBA. Recovered in the CTU.   2/20: extubated. thrombocytopenia noted. Pain management. Methadone started per Dr. Alvares.   2/21: Methadone dose decreased. PCA discontinued. CT remain in with minimal drainage.   2/22: ROBER-Lopressor and Amio load initiated. CT removed. Monsivais removed-voiding. Transferred to SDU. Remains in rate controlled AF in the 80s. PW on a VVI backup. Methadone dc'd, pain managed with oral pain meds. Discharge planning-home when medically stable.

## 2020-02-22 NOTE — PROGRESS NOTE ADULT - SUBJECTIVE AND OBJECTIVE BOX
CRITICAL CARE ATTENDING - CTICU    MEDICATIONS  (STANDING):  aspirin enteric coated 325 milliGRAM(s) Oral daily  atorvastatin 40 milliGRAM(s) Oral at bedtime  chlorhexidine 2% Cloths 1 Application(s) Topical <User Schedule>  dextrose 5%. 1000 milliLiter(s) (50 mL/Hr) IV Continuous <Continuous>  DULoxetine 60 milliGRAM(s) Oral two times a day  furosemide    Tablet 40 milliGRAM(s) Oral daily  insulin lispro (HumaLOG) corrective regimen sliding scale   SubCutaneous three times a day before meals  insulin lispro (HumaLOG) corrective regimen sliding scale   SubCutaneous at bedtime  lamoTRIgine 150 milliGRAM(s) Oral every 12 hours  levothyroxine 150 MICROGram(s) Oral daily  methadone    Tablet 5 milliGRAM(s) Oral every 12 hours  mupirocin 2% Ointment 1 Application(s) Topical every 12 hours  pantoprazole  Injectable 40 milliGRAM(s) IV Push daily  polyethylene glycol 3350 17 Gram(s) Oral daily  sodium chloride 0.9% lock flush 3 milliLiter(s) IV Push every 8 hours  sodium chloride 0.9%. 1000 milliLiter(s) (10 mL/Hr) IV Continuous <Continuous>  spironolactone 25 milliGRAM(s) Oral every 12 hours                                    7.7    9.65  )-----------( 147      ( 2020 00:47 )             25.0           135  |  103  |  15  ----------------------------<  126<H>  4.5   |  23  |  0.76    Ca    8.9      2020 00:47  Phos  3.0       Mg     2.1         TPro  6.0  /  Alb  3.7  /  TBili  0.4  /  DBili  x   /  AST  15  /  ALT  7<L>  /  AlkPhos  42        PT/INR - ( 2020 00:47 )   PT: 12.5 sec;   INR: 1.09 ratio         PTT - ( 2020 00:47 )  PTT:28.7 sec        Daily     Daily Weight in k.8 (2020 03:00)      - @ 07:01  -   @ 07:00  --------------------------------------------------------  IN: 1012 mL / OUT: 1868 mL / NET: -856 mL     @ 07:01   @ 08:03  --------------------------------------------------------  IN: 10 mL / OUT: 75 mL / NET: -65 mL        Critically Ill patient  : [ ] preoperative ,   [ x] post operative    Requires :  [ x] Arterial Line   [ ] Central Line  [ ] PA catheter  [ ] IABP  [ ] ECMO  [ ] LVAD  [ ] Ventilator  [ ] pacemaker [ ] Impella.                      [ x] ABG's     [x ] Pulse Oxymetry Monitoring  Bedside evaluation , monitoring , treatment of hemodynamics , fluids , IVP/ IVCD meds.        Diagnosis:     POD 3 - AVR / Asc. Aortic Repair     Hypotension - resolved     Lactic Acidosis - resolved     Chronic Pain    SLE     RH Arth.    Thrombocytopenia     Obesity OHS / SAYDA     Hypertension    Hypothyroid    Depression     CHF- acute [ ]   chronic [ x]    systolic [ ]   diatolic [x ]          - Echo- EF - AS            [ ] RV dysfunction          - Cxr-cardiomegally, edema          - Clinical-  [ ]inotropes   [ ]pressors   [ x]diuresis   [ ]IABP   [ ]ECMO   [ ]LVAD   [ ]Respiratory Failure    Fluid  overload                     -                         Discussed with CT surgeon, Physician's Assistant - Nurse Practitioner- Critical care medicine team.   Dicussed at  AM / PM rounds.   Chart, labs , films reviewed.    Total Time: 20 min

## 2020-02-23 LAB
ALBUMIN SERPL ELPH-MCNC: 3.7 G/DL — SIGNIFICANT CHANGE UP (ref 3.3–5)
ALP SERPL-CCNC: 45 U/L — SIGNIFICANT CHANGE UP (ref 40–120)
ALT FLD-CCNC: 11 U/L — SIGNIFICANT CHANGE UP (ref 10–45)
ANION GAP SERPL CALC-SCNC: 12 MMOL/L — SIGNIFICANT CHANGE UP (ref 5–17)
AST SERPL-CCNC: 17 U/L — SIGNIFICANT CHANGE UP (ref 10–40)
BILIRUB SERPL-MCNC: 0.6 MG/DL — SIGNIFICANT CHANGE UP (ref 0.2–1.2)
BUN SERPL-MCNC: 16 MG/DL — SIGNIFICANT CHANGE UP (ref 7–23)
CALCIUM SERPL-MCNC: 9.6 MG/DL — SIGNIFICANT CHANGE UP (ref 8.4–10.5)
CHLORIDE SERPL-SCNC: 102 MMOL/L — SIGNIFICANT CHANGE UP (ref 96–108)
CO2 SERPL-SCNC: 24 MMOL/L — SIGNIFICANT CHANGE UP (ref 22–31)
CREAT SERPL-MCNC: 0.65 MG/DL — SIGNIFICANT CHANGE UP (ref 0.5–1.3)
GLUCOSE SERPL-MCNC: 134 MG/DL — HIGH (ref 70–99)
HCT VFR BLD CALC: 27.8 % — LOW (ref 34.5–45)
HGB BLD-MCNC: 8.8 G/DL — LOW (ref 11.5–15.5)
MCHC RBC-ENTMCNC: 31.7 GM/DL — LOW (ref 32–36)
MCHC RBC-ENTMCNC: 31.8 PG — SIGNIFICANT CHANGE UP (ref 27–34)
MCV RBC AUTO: 100.4 FL — HIGH (ref 80–100)
NRBC # BLD: 0 /100 WBCS — SIGNIFICANT CHANGE UP (ref 0–0)
PLATELET # BLD AUTO: 178 K/UL — SIGNIFICANT CHANGE UP (ref 150–400)
POTASSIUM SERPL-MCNC: 5.2 MMOL/L — SIGNIFICANT CHANGE UP (ref 3.5–5.3)
POTASSIUM SERPL-SCNC: 5.2 MMOL/L — SIGNIFICANT CHANGE UP (ref 3.5–5.3)
PROT SERPL-MCNC: 6.3 G/DL — SIGNIFICANT CHANGE UP (ref 6–8.3)
RBC # BLD: 2.77 M/UL — LOW (ref 3.8–5.2)
RBC # FLD: 14.4 % — SIGNIFICANT CHANGE UP (ref 10.3–14.5)
SODIUM SERPL-SCNC: 138 MMOL/L — SIGNIFICANT CHANGE UP (ref 135–145)
WBC # BLD: 9.24 K/UL — SIGNIFICANT CHANGE UP (ref 3.8–10.5)
WBC # FLD AUTO: 9.24 K/UL — SIGNIFICANT CHANGE UP (ref 3.8–10.5)

## 2020-02-23 RX ORDER — ENOXAPARIN SODIUM 100 MG/ML
40 INJECTION SUBCUTANEOUS
Refills: 0 | Status: DISCONTINUED | OUTPATIENT
Start: 2020-02-23 | End: 2020-02-25

## 2020-02-23 RX ORDER — MAGNESIUM SULFATE 500 MG/ML
2 VIAL (ML) INJECTION ONCE
Refills: 0 | Status: COMPLETED | OUTPATIENT
Start: 2020-02-23 | End: 2020-02-23

## 2020-02-23 RX ORDER — METOPROLOL TARTRATE 50 MG
50 TABLET ORAL EVERY 8 HOURS
Refills: 0 | Status: DISCONTINUED | OUTPATIENT
Start: 2020-02-23 | End: 2020-02-25

## 2020-02-23 RX ADMIN — CHLORHEXIDINE GLUCONATE 1 APPLICATION(S): 213 SOLUTION TOPICAL at 10:57

## 2020-02-23 RX ADMIN — MUPIROCIN 1 APPLICATION(S): 20 OINTMENT TOPICAL at 16:57

## 2020-02-23 RX ADMIN — OXYCODONE HYDROCHLORIDE 10 MILLIGRAM(S): 5 TABLET ORAL at 21:09

## 2020-02-23 RX ADMIN — AMIODARONE HYDROCHLORIDE 400 MILLIGRAM(S): 400 TABLET ORAL at 14:05

## 2020-02-23 RX ADMIN — AMIODARONE HYDROCHLORIDE 400 MILLIGRAM(S): 400 TABLET ORAL at 05:35

## 2020-02-23 RX ADMIN — MUPIROCIN 1 APPLICATION(S): 20 OINTMENT TOPICAL at 05:36

## 2020-02-23 RX ADMIN — Medication 50 MILLIGRAM(S): at 14:05

## 2020-02-23 RX ADMIN — Medication 0.5 MILLIGRAM(S): at 09:30

## 2020-02-23 RX ADMIN — DULOXETINE HYDROCHLORIDE 60 MILLIGRAM(S): 30 CAPSULE, DELAYED RELEASE ORAL at 05:36

## 2020-02-23 RX ADMIN — Medication 150 MICROGRAM(S): at 05:35

## 2020-02-23 RX ADMIN — SPIRONOLACTONE 25 MILLIGRAM(S): 25 TABLET, FILM COATED ORAL at 16:57

## 2020-02-23 RX ADMIN — AMIODARONE HYDROCHLORIDE 400 MILLIGRAM(S): 400 TABLET ORAL at 21:42

## 2020-02-23 RX ADMIN — POLYETHYLENE GLYCOL 3350 17 GRAM(S): 17 POWDER, FOR SOLUTION ORAL at 12:48

## 2020-02-23 RX ADMIN — ATORVASTATIN CALCIUM 40 MILLIGRAM(S): 80 TABLET, FILM COATED ORAL at 21:42

## 2020-02-23 RX ADMIN — LAMOTRIGINE 150 MILLIGRAM(S): 25 TABLET, ORALLY DISINTEGRATING ORAL at 12:48

## 2020-02-23 RX ADMIN — SODIUM CHLORIDE 3 MILLILITER(S): 9 INJECTION INTRAMUSCULAR; INTRAVENOUS; SUBCUTANEOUS at 05:36

## 2020-02-23 RX ADMIN — Medication 50 MILLIGRAM(S): at 05:35

## 2020-02-23 RX ADMIN — SPIRONOLACTONE 25 MILLIGRAM(S): 25 TABLET, FILM COATED ORAL at 05:35

## 2020-02-23 RX ADMIN — OXYCODONE HYDROCHLORIDE 10 MILLIGRAM(S): 5 TABLET ORAL at 20:39

## 2020-02-23 RX ADMIN — Medication 325 MILLIGRAM(S): at 12:47

## 2020-02-23 RX ADMIN — Medication 40 MILLIGRAM(S): at 05:35

## 2020-02-23 RX ADMIN — LAMOTRIGINE 150 MILLIGRAM(S): 25 TABLET, ORALLY DISINTEGRATING ORAL at 02:02

## 2020-02-23 RX ADMIN — SODIUM CHLORIDE 3 MILLILITER(S): 9 INJECTION INTRAMUSCULAR; INTRAVENOUS; SUBCUTANEOUS at 14:00

## 2020-02-23 RX ADMIN — ENOXAPARIN SODIUM 40 MILLIGRAM(S): 100 INJECTION SUBCUTANEOUS at 16:56

## 2020-02-23 RX ADMIN — OXYCODONE HYDROCHLORIDE 10 MILLIGRAM(S): 5 TABLET ORAL at 12:46

## 2020-02-23 RX ADMIN — DULOXETINE HYDROCHLORIDE 60 MILLIGRAM(S): 30 CAPSULE, DELAYED RELEASE ORAL at 16:56

## 2020-02-23 RX ADMIN — Medication 50 MILLIGRAM(S): at 21:42

## 2020-02-23 RX ADMIN — SODIUM CHLORIDE 3 MILLILITER(S): 9 INJECTION INTRAMUSCULAR; INTRAVENOUS; SUBCUTANEOUS at 21:40

## 2020-02-23 RX ADMIN — OXYCODONE HYDROCHLORIDE 10 MILLIGRAM(S): 5 TABLET ORAL at 13:15

## 2020-02-23 RX ADMIN — PANTOPRAZOLE SODIUM 40 MILLIGRAM(S): 20 TABLET, DELAYED RELEASE ORAL at 12:48

## 2020-02-23 RX ADMIN — Medication 50 GRAM(S): at 09:31

## 2020-02-23 NOTE — PROGRESS NOTE ADULT - ASSESSMENT
68 yo female, PMH HTN, HLD, bipolar disorder, fibromyalgia, hypothyroid, chronic pain due to OA bilateral knees and hips, SLE, RA, known moderate AS, incidental finding dilation of ascending aorta 5.1cm on CT scan and aortic/thoracic aneurysm, had pre-op cardiac cath 11/5/19 (normal coronaries), pt. feel on left hip and suffered fracture, and surgery postponed. Pt. presents for scheduled Aortic Valve Replacement, Ascending Aortic Replacement.   2/19: AVR (tissue) and ascending aorta replacement with Dr. Alvares. Received intra-op products: 1 Cryo, 1 platelet, 1000 FEIBA. Recovered in the CTU.   2/20: extubated. thrombocytopenia noted. Pain management. Methadone started per Dr. Alvares.   2/21: Methadone dose decreased. PCA discontinued. CT remain in with minimal drainage.   2/22: ROBER-Lopressor and Amio load initiated. CT removed. Monsivais removed-voiding. Transferred to SDU. Remains in rate controlled AF in the 80s. PW on a VVI backup. Methadone dc'd, pain managed with oral pain meds. Discharge planning-home when medically stable.  2/23 Pt still in Afib rate controlled-on amio load- d/c mars d/c planning 68 yo female, PMH HTN, HLD, bipolar disorder, fibromyalgia, hypothyroid, chronic pain due to OA bilateral knees and hips, SLE, RA, known moderate AS, incidental finding dilation of ascending aorta 5.1cm on CT scan and aortic/thoracic aneurysm, had pre-op cardiac cath 11/5/19 (normal coronaries), pt. feel on left hip and suffered fracture, and surgery postponed. Pt. presents for scheduled Aortic Valve Replacement, Ascending Aortic Replacement.   2/19: AVR (tissue) and ascending aorta replacement with Dr. Alvares. Received intra-op products: 1 Cryo, 1 platelet, 1000 FEIBA. Recovered in the CTU.   2/20: extubated. thrombocytopenia noted. Pain management. Methadone started per Dr. Alvares.   2/21: Methadone dose decreased. PCA discontinued. CT remain in with minimal drainage.   2/22: ROBER-Lopressor and Amio load initiated. CT removed. Monsivais removed-voiding. Transferred to SDU. Remains in rate controlled AF in the 80s. PW on a VVI backup. Methadone dc'd, pain managed with oral pain meds. Discharge planning-home when medically stable.  2/23 Pt still in Afib rate controlled x 24 hrs converted to SR 1st deg. @ 10m - given mg 2gm iv & BB increased to tid -on amio load- d/c mars  no heparin gtt or coumadin @ this time per dr. alvares - start lvx 30 bid  d/c planning

## 2020-02-23 NOTE — PROGRESS NOTE ADULT - SUBJECTIVE AND OBJECTIVE BOX
VITAL SIGNS-Telemetry:  afib 89/mf96-164  Vital Signs Last 24 Hrs  T(C): 36.6 (02-23-20 @ 03:26), Max: 37.7 (02-22-20 @ 08:00)  T(F): 97.9 (02-23-20 @ 03:26), Max: 99.9 (02-22-20 @ 08:00)  HR: 88 (02-23-20 @ 05:32) (76 - 102)  BP: 117/64 (02-23-20 @ 05:32) (95/50 - 117/64)  RR: 18 (02-23-20 @ 05:32) (11 - 18)  SpO2: 97% (02-23-20 @ 05:32) (89% - 100%)         02-21 @ 07:01  -  02-22 @ 07:00  --------------------------------------------------------  IN: 1012 mL / OUT: 1868 mL / NET: -856 mL    02-22 @ 07:01  -  02-23 @ 06:29  --------------------------------------------------------  IN: 740 mL / OUT: 975 mL / NET: -235 mL    Daily     Daily     CAPILLARY BLOOD GLUCOSE  POCT Blood Glucose.: 94 mg/dL (22 Feb 2020 21:38)  POCT Blood Glucose.: 110 mg/dL (22 Feb 2020 17:00)  POCT Blood Glucose.: 108 mg/dL (22 Feb 2020 08:17)        Pacing Wires        [  ]   Settings:                                  Isolated  [ x ]      PHYSICAL EXAM:  Neurology: alert and oriented x 3, nonfocal, no gross deficits  CV : S1S2  Sternal Wound :  CDI , Stable  Lungs:  Abdomen: soft, nontender, nondistended, positive bowel sounds, last bowel movement      pre-op   Extremities:         acetaminophen   Tablet .. 650 milliGRAM(s) Oral every 6 hours PRN  aMIOdarone    Tablet   Oral   aMIOdarone    Tablet 400 milliGRAM(s) Oral every 8 hours  aspirin enteric coated 325 milliGRAM(s) Oral daily  atorvastatin 40 milliGRAM(s) Oral at bedtime  chlorhexidine 2% Cloths 1 Application(s) Topical <User Schedule>  cyclobenzaprine 10 milliGRAM(s) Oral three times a day PRN  dextrose 5%. 1000 milliLiter(s) IV Continuous <Continuous>  DULoxetine 60 milliGRAM(s) Oral two times a day  enoxaparin Injectable 40 milliGRAM(s) SubCutaneous daily  furosemide    Tablet 40 milliGRAM(s) Oral daily  insulin lispro (HumaLOG) corrective regimen sliding scale   SubCutaneous three times a day before meals  insulin lispro (HumaLOG) corrective regimen sliding scale   SubCutaneous at bedtime  lamoTRIgine 150 milliGRAM(s) Oral every 12 hours  levothyroxine 150 MICROGram(s) Oral daily  LORazepam     Tablet 0.5 milliGRAM(s) Oral every 8 hours PRN  metoprolol tartrate 50 milliGRAM(s) Oral two times a day  mupirocin 2% Ointment 1 Application(s) Topical every 12 hours  ondansetron Injectable 4 milliGRAM(s) IV Push every 6 hours PRN  oxyCODONE    IR 10 milliGRAM(s) Oral every 4 hours PRN  pantoprazole  Injectable 40 milliGRAM(s) IV Push daily  polyethylene glycol 3350 17 Gram(s) Oral daily  sodium chloride 0.9% lock flush 3 milliLiter(s) IV Push every 8 hours  sodium chloride 0.9%. 1000 milliLiter(s) IV Continuous <Continuous>  sorbitol 70% Solution 30 milliLiter(s) Oral once  spironolactone 25 milliGRAM(s) Oral every 12 hours    Physical Therapy Rec:   Home  [  ]   Home w/ PT  [  ]  Rehab  [  ]  Discussed with Cardiothoracic Team at AM rounds. VITAL SIGNS-Telemetry:  afib 89/ml60-176  Vital Signs Last 24 Hrs  T(C): 36.6 (02-23-20 @ 03:26), Max: 37.7 (02-22-20 @ 08:00)  T(F): 97.9 (02-23-20 @ 03:26), Max: 99.9 (02-22-20 @ 08:00)  HR: 88 (02-23-20 @ 05:32) (76 - 102)  BP: 117/64 (02-23-20 @ 05:32) (95/50 - 117/64)  RR: 18 (02-23-20 @ 05:32) (11 - 18)  SpO2: 97% (02-23-20 @ 05:32) (89% - 100%)         02-21 @ 07:01  -  02-22 @ 07:00  --------------------------------------------------------  IN: 1012 mL / OUT: 1868 mL / NET: -856 mL    02-22 @ 07:01  -  02-23 @ 06:29  --------------------------------------------------------  IN: 740 mL / OUT: 975 mL / NET: -235 mL    Daily     Daily     CAPILLARY BLOOD GLUCOSE  POCT Blood Glucose.: 94 mg/dL (22 Feb 2020 21:38)  POCT Blood Glucose.: 110 mg/dL (22 Feb 2020 17:00)  POCT Blood Glucose.: 108 mg/dL (22 Feb 2020 08:17)        Pacing Wires        [x  ]   Settings:            vvi 45                      Isolated  [  ]      PHYSICAL EXAM:  Neurology: alert and oriented x 3, nonfocal, no gross deficits  CV : S1S2  Sternal Wound :  CDI , Stable  Lungs: cta  Abdomen: soft, nontender, nondistended, positive bowel sounds, last bowel movement      pre-op   Extremities:   +edema tr. no calf tenderness      acetaminophen   Tablet .. 650 milliGRAM(s) Oral every 6 hours PRN  aMIOdarone    Tablet   Oral   aMIOdarone    Tablet 400 milliGRAM(s) Oral every 8 hours  aspirin enteric coated 325 milliGRAM(s) Oral daily  atorvastatin 40 milliGRAM(s) Oral at bedtime  chlorhexidine 2% Cloths 1 Application(s) Topical <User Schedule>  cyclobenzaprine 10 milliGRAM(s) Oral three times a day PRN  dextrose 5%. 1000 milliLiter(s) IV Continuous <Continuous>  DULoxetine 60 milliGRAM(s) Oral two times a day  enoxaparin Injectable 40 milliGRAM(s) SubCutaneous daily  furosemide    Tablet 40 milliGRAM(s) Oral daily  insulin lispro (HumaLOG) corrective regimen sliding scale   SubCutaneous three times a day before meals  insulin lispro (HumaLOG) corrective regimen sliding scale   SubCutaneous at bedtime  lamoTRIgine 150 milliGRAM(s) Oral every 12 hours  levothyroxine 150 MICROGram(s) Oral daily  LORazepam     Tablet 0.5 milliGRAM(s) Oral every 8 hours PRN  metoprolol tartrate 50 milliGRAM(s) Oral two times a day  mupirocin 2% Ointment 1 Application(s) Topical every 12 hours  ondansetron Injectable 4 milliGRAM(s) IV Push every 6 hours PRN  oxyCODONE    IR 10 milliGRAM(s) Oral every 4 hours PRN  pantoprazole  Injectable 40 milliGRAM(s) IV Push daily  polyethylene glycol 3350 17 Gram(s) Oral daily  sodium chloride 0.9% lock flush 3 milliLiter(s) IV Push every 8 hours  sodium chloride 0.9%. 1000 milliLiter(s) IV Continuous <Continuous>  sorbitol 70% Solution 30 milliLiter(s) Oral once  spironolactone 25 milliGRAM(s) Oral every 12 hours    Physical Therapy Rec:   Home  [  ]   Home w/ PT  [  ]  Rehab  [  ]  Discussed with Cardiothoracic Team at AM rounds.

## 2020-02-23 NOTE — PROGRESS NOTE ADULT - PROBLEM SELECTOR PLAN 2
New onset of ROBER on 2/22--rate controlled in the 80s   On lopressor and Amio load initiated  continue to monitor on tele New onset of ROBER on 2/22--rate controlled in the 80s x 24hrs  SR 1st deg. @ 10am 2/23  On lopressor and Amio load initiated  continue to monitor on tele

## 2020-02-24 LAB
ANION GAP SERPL CALC-SCNC: 12 MMOL/L — SIGNIFICANT CHANGE UP (ref 5–17)
APTT BLD: 26.9 SEC — LOW (ref 27.5–36.3)
BUN SERPL-MCNC: 21 MG/DL — SIGNIFICANT CHANGE UP (ref 7–23)
CALCIUM SERPL-MCNC: 9.3 MG/DL — SIGNIFICANT CHANGE UP (ref 8.4–10.5)
CHLORIDE SERPL-SCNC: 99 MMOL/L — SIGNIFICANT CHANGE UP (ref 96–108)
CO2 SERPL-SCNC: 26 MMOL/L — SIGNIFICANT CHANGE UP (ref 22–31)
CREAT SERPL-MCNC: 0.72 MG/DL — SIGNIFICANT CHANGE UP (ref 0.5–1.3)
GLUCOSE SERPL-MCNC: 111 MG/DL — HIGH (ref 70–99)
HCT VFR BLD CALC: 27.3 % — LOW (ref 34.5–45)
HGB BLD-MCNC: 8.6 G/DL — LOW (ref 11.5–15.5)
INR BLD: 1.16 RATIO — SIGNIFICANT CHANGE UP (ref 0.88–1.16)
MCHC RBC-ENTMCNC: 31.5 GM/DL — LOW (ref 32–36)
MCHC RBC-ENTMCNC: 31.9 PG — SIGNIFICANT CHANGE UP (ref 27–34)
MCV RBC AUTO: 101.1 FL — HIGH (ref 80–100)
NRBC # BLD: 0 /100 WBCS — SIGNIFICANT CHANGE UP (ref 0–0)
PLATELET # BLD AUTO: 165 K/UL — SIGNIFICANT CHANGE UP (ref 150–400)
POTASSIUM SERPL-MCNC: 4.7 MMOL/L — SIGNIFICANT CHANGE UP (ref 3.5–5.3)
POTASSIUM SERPL-SCNC: 4.7 MMOL/L — SIGNIFICANT CHANGE UP (ref 3.5–5.3)
PROTHROM AB SERPL-ACNC: 13.3 SEC — HIGH (ref 10–12.9)
RBC # BLD: 2.7 M/UL — LOW (ref 3.8–5.2)
RBC # FLD: 14.1 % — SIGNIFICANT CHANGE UP (ref 10.3–14.5)
SODIUM SERPL-SCNC: 137 MMOL/L — SIGNIFICANT CHANGE UP (ref 135–145)
SURGICAL PATHOLOGY STUDY: SIGNIFICANT CHANGE UP
WBC # BLD: 6.61 K/UL — SIGNIFICANT CHANGE UP (ref 3.8–10.5)
WBC # FLD AUTO: 6.61 K/UL — SIGNIFICANT CHANGE UP (ref 3.8–10.5)

## 2020-02-24 RX ADMIN — CHLORHEXIDINE GLUCONATE 1 APPLICATION(S): 213 SOLUTION TOPICAL at 05:49

## 2020-02-24 RX ADMIN — AMIODARONE HYDROCHLORIDE 400 MILLIGRAM(S): 400 TABLET ORAL at 13:17

## 2020-02-24 RX ADMIN — ATORVASTATIN CALCIUM 40 MILLIGRAM(S): 80 TABLET, FILM COATED ORAL at 21:56

## 2020-02-24 RX ADMIN — Medication 0.5 MILLIGRAM(S): at 20:35

## 2020-02-24 RX ADMIN — AMIODARONE HYDROCHLORIDE 400 MILLIGRAM(S): 400 TABLET ORAL at 21:56

## 2020-02-24 RX ADMIN — Medication 50 MILLIGRAM(S): at 13:17

## 2020-02-24 RX ADMIN — SODIUM CHLORIDE 3 MILLILITER(S): 9 INJECTION INTRAMUSCULAR; INTRAVENOUS; SUBCUTANEOUS at 14:36

## 2020-02-24 RX ADMIN — AMIODARONE HYDROCHLORIDE 400 MILLIGRAM(S): 400 TABLET ORAL at 05:24

## 2020-02-24 RX ADMIN — OXYCODONE HYDROCHLORIDE 10 MILLIGRAM(S): 5 TABLET ORAL at 10:47

## 2020-02-24 RX ADMIN — DULOXETINE HYDROCHLORIDE 60 MILLIGRAM(S): 30 CAPSULE, DELAYED RELEASE ORAL at 05:23

## 2020-02-24 RX ADMIN — Medication 0.5 MILLIGRAM(S): at 06:34

## 2020-02-24 RX ADMIN — SODIUM CHLORIDE 3 MILLILITER(S): 9 INJECTION INTRAMUSCULAR; INTRAVENOUS; SUBCUTANEOUS at 05:07

## 2020-02-24 RX ADMIN — Medication 150 MICROGRAM(S): at 05:23

## 2020-02-24 RX ADMIN — PANTOPRAZOLE SODIUM 40 MILLIGRAM(S): 20 TABLET, DELAYED RELEASE ORAL at 08:02

## 2020-02-24 RX ADMIN — Medication 50 MILLIGRAM(S): at 05:24

## 2020-02-24 RX ADMIN — ENOXAPARIN SODIUM 40 MILLIGRAM(S): 100 INJECTION SUBCUTANEOUS at 17:37

## 2020-02-24 RX ADMIN — OXYCODONE HYDROCHLORIDE 10 MILLIGRAM(S): 5 TABLET ORAL at 22:26

## 2020-02-24 RX ADMIN — OXYCODONE HYDROCHLORIDE 10 MILLIGRAM(S): 5 TABLET ORAL at 06:03

## 2020-02-24 RX ADMIN — OXYCODONE HYDROCHLORIDE 10 MILLIGRAM(S): 5 TABLET ORAL at 05:33

## 2020-02-24 RX ADMIN — Medication 50 MILLIGRAM(S): at 21:56

## 2020-02-24 RX ADMIN — OXYCODONE HYDROCHLORIDE 10 MILLIGRAM(S): 5 TABLET ORAL at 21:56

## 2020-02-24 RX ADMIN — POLYETHYLENE GLYCOL 3350 17 GRAM(S): 17 POWDER, FOR SOLUTION ORAL at 08:02

## 2020-02-24 RX ADMIN — LAMOTRIGINE 150 MILLIGRAM(S): 25 TABLET, ORALLY DISINTEGRATING ORAL at 00:51

## 2020-02-24 RX ADMIN — MUPIROCIN 1 APPLICATION(S): 20 OINTMENT TOPICAL at 05:24

## 2020-02-24 RX ADMIN — DULOXETINE HYDROCHLORIDE 60 MILLIGRAM(S): 30 CAPSULE, DELAYED RELEASE ORAL at 17:37

## 2020-02-24 RX ADMIN — Medication 325 MILLIGRAM(S): at 08:02

## 2020-02-24 RX ADMIN — LAMOTRIGINE 150 MILLIGRAM(S): 25 TABLET, ORALLY DISINTEGRATING ORAL at 13:17

## 2020-02-24 RX ADMIN — SODIUM CHLORIDE 3 MILLILITER(S): 9 INJECTION INTRAMUSCULAR; INTRAVENOUS; SUBCUTANEOUS at 21:37

## 2020-02-24 RX ADMIN — Medication 40 MILLIGRAM(S): at 05:23

## 2020-02-24 RX ADMIN — OXYCODONE HYDROCHLORIDE 10 MILLIGRAM(S): 5 TABLET ORAL at 09:46

## 2020-02-24 RX ADMIN — ENOXAPARIN SODIUM 40 MILLIGRAM(S): 100 INJECTION SUBCUTANEOUS at 05:24

## 2020-02-24 NOTE — PROGRESS NOTE ADULT - SUBJECTIVE AND OBJECTIVE BOX
VITAL SIGNS    Telemetry:  Nsr 60-70, episodes of apacing/v pacing, last afib 9am yesterday    Vital Signs Last 24 Hrs  T(C): 36.9 (20 @ 07:27), Max: 36.9 (20 @ 07:27)  T(F): 98.5 (20 @ 07:27), Max: 98.5 (20 @ 07:27)  HR: 67 (20 @ 10:00) (59 - 73)  BP: 148/68 (20 @ 10:00) (98/55 - 148/68)  RR: 18 (20 @ 07:27) (18 - 18)  SpO2: 94% (20 @ 10:00) (90% - 98%)                    @ 07:01  -   @ 07:00  --------------------------------------------------------  IN: 190 mL / OUT: 1040 mL / NET: -850 mL     @ 07:01  -   @ 12:08  --------------------------------------------------------  IN: 720 mL / OUT: 400 mL / NET: 320 mL          Daily     Daily Weight in k.3 (2020 06:15)            CAPILLARY BLOOD GLUCOSE                    Pacing Wires        [ x vvi 40/12 ]   Settings:                                  Isolated  [  ]                                      PHYSICAL EXAM        Neurology: alert and oriented x 3, nonfocal, no gross deficits  CV : s1 s2 RRR  Sternal Wound :  CDI , Stable  Lungs: cta  Abdomen: soft, nontender, nondistended, positive bowel sounds, last bowel movement                      :    voiding        Extremities:     trace edema   /  -   calve tenderness ,            acetaminophen   Tablet .. 650 milliGRAM(s) Oral every 6 hours PRN  aMIOdarone    Tablet   Oral   aMIOdarone    Tablet 400 milliGRAM(s) Oral every 8 hours  aspirin enteric coated 325 milliGRAM(s) Oral daily  atorvastatin 40 milliGRAM(s) Oral at bedtime  chlorhexidine 2% Cloths 1 Application(s) Topical <User Schedule>  cyclobenzaprine 10 milliGRAM(s) Oral three times a day PRN  DULoxetine 60 milliGRAM(s) Oral two times a day  enoxaparin Injectable 40 milliGRAM(s) SubCutaneous two times a day  furosemide    Tablet 40 milliGRAM(s) Oral daily  lamoTRIgine 150 milliGRAM(s) Oral every 12 hours  levothyroxine 150 MICROGram(s) Oral daily  LORazepam     Tablet 0.5 milliGRAM(s) Oral every 8 hours PRN  metoprolol tartrate 50 milliGRAM(s) Oral every 8 hours  ondansetron Injectable 4 milliGRAM(s) IV Push every 6 hours PRN  oxyCODONE    IR 10 milliGRAM(s) Oral every 4 hours PRN  pantoprazole  Injectable 40 milliGRAM(s) IV Push daily  polyethylene glycol 3350 17 Gram(s) Oral daily  sodium chloride 0.9% lock flush 3 milliLiter(s) IV Push every 8 hours  sorbitol 70% Solution 30 milliLiter(s) Oral once                    Physical Therapy Rec:   Home  [  ]   Home w/ PT  [  ]  Rehab  [  ]  Discussed with Cardiothoracic Team at AM rounds.

## 2020-02-24 NOTE — PROGRESS NOTE ADULT - ASSESSMENT
70 yo female, PMH HTN, HLD, bipolar disorder, fibromyalgia, hypothyroid, chronic pain due to OA bilateral knees and hips, SLE, RA, known moderate AS, incidental finding dilation of ascending aorta 5.1cm on CT scan and aortic/thoracic aneurysm, had pre-op cardiac cath 11/5/19 (normal coronaries), pt. feel on left hip and suffered fracture, and surgery postponed. Pt. presents for scheduled Aortic Valve Replacement, Ascending Aortic Replacement.   2/19: AVR (tissue) and ascending aorta replacement with Dr. Alvares. Received intra-op products: 1 Cryo, 1 platelet, 1000 FEIBA. Recovered in the CTU.   2/20: extubated. thrombocytopenia noted. Pain management. Methadone started per Dr. Alvares.   2/21: Methadone dose decreased. PCA discontinued. CT remain in with minimal drainage.   2/22: ROBER-Lopressor and Amio load initiated. CT removed. Monsivais removed-voiding. Transferred to SDU. Remains in rate controlled AF in the 80s. PW on a VVI backup. Methadone dc'd, pain managed with oral pain meds. Discharge planning-home when medically stable.  2/23 Pt still in Afib rate controlled x 24 hrs converted to SR 1st deg. @ 10m - given mg 2gm iv & BB increased to tid -on amio load- d/c mars  no heparin gtt or coumadin @ this time per dr. alvares - start lvx 30 bid  d/c planning  2/24 NSR, episodes of pacing yesterday.  Pacer decreased to 30  Amio load completes  by tomorrow  Pain management

## 2020-02-24 NOTE — PROGRESS NOTE ADULT - PROBLEM SELECTOR PLAN 2
New onset of ROBER on 2/22--rate controlled in the 80s x 24hrs  SR 1st deg. @ 10am 2/23  On lopressor and Amio load initiated  continue to monitor on tele

## 2020-02-25 ENCOUNTER — NON-APPOINTMENT (OUTPATIENT)
Age: 70
End: 2020-02-25

## 2020-02-25 LAB
ALBUMIN SERPL ELPH-MCNC: 3.7 G/DL — SIGNIFICANT CHANGE UP (ref 3.3–5)
ALP SERPL-CCNC: 55 U/L — SIGNIFICANT CHANGE UP (ref 40–120)
ALT FLD-CCNC: 19 U/L — SIGNIFICANT CHANGE UP (ref 10–45)
ANION GAP SERPL CALC-SCNC: 13 MMOL/L — SIGNIFICANT CHANGE UP (ref 5–17)
AST SERPL-CCNC: 18 U/L — SIGNIFICANT CHANGE UP (ref 10–40)
BASOPHILS # BLD AUTO: 0.04 K/UL — SIGNIFICANT CHANGE UP (ref 0–0.2)
BASOPHILS NFR BLD AUTO: 0.6 % — SIGNIFICANT CHANGE UP (ref 0–2)
BILIRUB SERPL-MCNC: 0.5 MG/DL — SIGNIFICANT CHANGE UP (ref 0.2–1.2)
BUN SERPL-MCNC: 24 MG/DL — HIGH (ref 7–23)
CALCIUM SERPL-MCNC: 9.4 MG/DL — SIGNIFICANT CHANGE UP (ref 8.4–10.5)
CHLORIDE SERPL-SCNC: 98 MMOL/L — SIGNIFICANT CHANGE UP (ref 96–108)
CO2 SERPL-SCNC: 26 MMOL/L — SIGNIFICANT CHANGE UP (ref 22–31)
CREAT SERPL-MCNC: 0.8 MG/DL — SIGNIFICANT CHANGE UP (ref 0.5–1.3)
EOSINOPHIL # BLD AUTO: 0.16 K/UL — SIGNIFICANT CHANGE UP (ref 0–0.5)
EOSINOPHIL NFR BLD AUTO: 2.3 % — SIGNIFICANT CHANGE UP (ref 0–6)
GLUCOSE SERPL-MCNC: 112 MG/DL — HIGH (ref 70–99)
HCT VFR BLD CALC: 27.2 % — LOW (ref 34.5–45)
HGB BLD-MCNC: 8.5 G/DL — LOW (ref 11.5–15.5)
IMM GRANULOCYTES NFR BLD AUTO: 0.6 % — SIGNIFICANT CHANGE UP (ref 0–1.5)
LYMPHOCYTES # BLD AUTO: 1.3 K/UL — SIGNIFICANT CHANGE UP (ref 1–3.3)
LYMPHOCYTES # BLD AUTO: 18.7 % — SIGNIFICANT CHANGE UP (ref 13–44)
MCHC RBC-ENTMCNC: 31.3 GM/DL — LOW (ref 32–36)
MCHC RBC-ENTMCNC: 31.3 PG — SIGNIFICANT CHANGE UP (ref 27–34)
MCV RBC AUTO: 100 FL — SIGNIFICANT CHANGE UP (ref 80–100)
MONOCYTES # BLD AUTO: 0.75 K/UL — SIGNIFICANT CHANGE UP (ref 0–0.9)
MONOCYTES NFR BLD AUTO: 10.8 % — SIGNIFICANT CHANGE UP (ref 2–14)
NEUTROPHILS # BLD AUTO: 4.68 K/UL — SIGNIFICANT CHANGE UP (ref 1.8–7.4)
NEUTROPHILS NFR BLD AUTO: 67 % — SIGNIFICANT CHANGE UP (ref 43–77)
NRBC # BLD: 0 /100 WBCS — SIGNIFICANT CHANGE UP (ref 0–0)
PLATELET # BLD AUTO: 253 K/UL — SIGNIFICANT CHANGE UP (ref 150–400)
POTASSIUM SERPL-MCNC: 4.4 MMOL/L — SIGNIFICANT CHANGE UP (ref 3.5–5.3)
POTASSIUM SERPL-SCNC: 4.4 MMOL/L — SIGNIFICANT CHANGE UP (ref 3.5–5.3)
PROT SERPL-MCNC: 6.4 G/DL — SIGNIFICANT CHANGE UP (ref 6–8.3)
RBC # BLD: 2.72 M/UL — LOW (ref 3.8–5.2)
RBC # FLD: 14 % — SIGNIFICANT CHANGE UP (ref 10.3–14.5)
SODIUM SERPL-SCNC: 137 MMOL/L — SIGNIFICANT CHANGE UP (ref 135–145)
WBC # BLD: 6.97 K/UL — SIGNIFICANT CHANGE UP (ref 3.8–10.5)
WBC # FLD AUTO: 6.97 K/UL — SIGNIFICANT CHANGE UP (ref 3.8–10.5)

## 2020-02-25 PROCEDURE — 71045 X-RAY EXAM CHEST 1 VIEW: CPT | Mod: 26

## 2020-02-25 RX ORDER — METOPROLOL TARTRATE 50 MG
50 TABLET ORAL EVERY 12 HOURS
Refills: 0 | Status: DISCONTINUED | OUTPATIENT
Start: 2020-02-25 | End: 2020-02-28

## 2020-02-25 RX ORDER — ENOXAPARIN SODIUM 100 MG/ML
40 INJECTION SUBCUTANEOUS DAILY
Refills: 0 | Status: DISCONTINUED | OUTPATIENT
Start: 2020-02-26 | End: 2020-02-26

## 2020-02-25 RX ADMIN — CHLORHEXIDINE GLUCONATE 1 APPLICATION(S): 213 SOLUTION TOPICAL at 06:07

## 2020-02-25 RX ADMIN — LAMOTRIGINE 150 MILLIGRAM(S): 25 TABLET, ORALLY DISINTEGRATING ORAL at 13:23

## 2020-02-25 RX ADMIN — SODIUM CHLORIDE 3 MILLILITER(S): 9 INJECTION INTRAMUSCULAR; INTRAVENOUS; SUBCUTANEOUS at 05:13

## 2020-02-25 RX ADMIN — OXYCODONE HYDROCHLORIDE 10 MILLIGRAM(S): 5 TABLET ORAL at 06:36

## 2020-02-25 RX ADMIN — Medication 0.5 MILLIGRAM(S): at 22:19

## 2020-02-25 RX ADMIN — AMIODARONE HYDROCHLORIDE 400 MILLIGRAM(S): 400 TABLET ORAL at 22:10

## 2020-02-25 RX ADMIN — Medication 50 MILLIGRAM(S): at 05:12

## 2020-02-25 RX ADMIN — Medication 40 MILLIGRAM(S): at 05:12

## 2020-02-25 RX ADMIN — DULOXETINE HYDROCHLORIDE 60 MILLIGRAM(S): 30 CAPSULE, DELAYED RELEASE ORAL at 05:12

## 2020-02-25 RX ADMIN — PANTOPRAZOLE SODIUM 40 MILLIGRAM(S): 20 TABLET, DELAYED RELEASE ORAL at 05:12

## 2020-02-25 RX ADMIN — LAMOTRIGINE 150 MILLIGRAM(S): 25 TABLET, ORALLY DISINTEGRATING ORAL at 02:38

## 2020-02-25 RX ADMIN — DULOXETINE HYDROCHLORIDE 60 MILLIGRAM(S): 30 CAPSULE, DELAYED RELEASE ORAL at 17:53

## 2020-02-25 RX ADMIN — ATORVASTATIN CALCIUM 40 MILLIGRAM(S): 80 TABLET, FILM COATED ORAL at 22:09

## 2020-02-25 RX ADMIN — Medication 150 MICROGRAM(S): at 05:11

## 2020-02-25 RX ADMIN — SODIUM CHLORIDE 3 MILLILITER(S): 9 INJECTION INTRAMUSCULAR; INTRAVENOUS; SUBCUTANEOUS at 17:09

## 2020-02-25 RX ADMIN — POLYETHYLENE GLYCOL 3350 17 GRAM(S): 17 POWDER, FOR SOLUTION ORAL at 08:27

## 2020-02-25 RX ADMIN — SODIUM CHLORIDE 3 MILLILITER(S): 9 INJECTION INTRAMUSCULAR; INTRAVENOUS; SUBCUTANEOUS at 22:10

## 2020-02-25 RX ADMIN — AMIODARONE HYDROCHLORIDE 400 MILLIGRAM(S): 400 TABLET ORAL at 13:23

## 2020-02-25 RX ADMIN — ENOXAPARIN SODIUM 40 MILLIGRAM(S): 100 INJECTION SUBCUTANEOUS at 05:12

## 2020-02-25 RX ADMIN — AMIODARONE HYDROCHLORIDE 400 MILLIGRAM(S): 400 TABLET ORAL at 05:11

## 2020-02-25 RX ADMIN — OXYCODONE HYDROCHLORIDE 10 MILLIGRAM(S): 5 TABLET ORAL at 06:06

## 2020-02-25 RX ADMIN — Medication 50 MILLIGRAM(S): at 17:52

## 2020-02-25 RX ADMIN — Medication 325 MILLIGRAM(S): at 08:27

## 2020-02-25 NOTE — PROGRESS NOTE ADULT - ASSESSMENT
68 yo female, PMH HTN, HLD, bipolar disorder, fibromyalgia, hypothyroid, chronic pain due to OA bilateral knees and hips, SLE, RA, known moderate AS, incidental finding dilation of ascending aorta 5.1cm on CT scan and aortic/thoracic aneurysm, had pre-op cardiac cath 11/5/19 (normal coronaries), pt. feel on left hip and suffered fracture, and surgery postponed. Pt. presents for scheduled Aortic Valve Replacement, Ascending Aortic Replacement.   2/19: AVR (tissue) and ascending aorta replacement with Dr. Alvares. Received intra-op products: 1 Cryo, 1 platelet, 1000 FEIBA. Recovered in the CTU.   2/20: extubated. thrombocytopenia noted. Pain management. Methadone started per Dr. Alvares.   2/21: Methadone dose decreased. PCA discontinued. CT remain in with minimal drainage.   2/22: ROBER-Lopressor and Amio load initiated. CT removed. Monsivais removed-voiding. Transferred to SDU. Remains in rate controlled AF in the 80s. PW on a VVI backup. Methadone dc'd, pain managed with oral pain meds. Discharge planning-home when medically stable.  2/23 Pt still in Afib rate controlled x 24 hrs converted to SR 1st deg. @ 10m - given mg 2gm iv & BB increased to tid -on amio load- d/c mars  no heparin gtt or coumadin @ this time per dr. alvares - start lvx 30 bid  d/c planning  2/24 NSR, episodes of pacing yesterday.  Pacer decreased to 30  Amio load completes  by tomorrow  Pain management  2/25 SB, decrease  lopressor 50q12, amio 200.  Cont diuresis.  d/c planning to home

## 2020-02-25 NOTE — PROGRESS NOTE ADULT - SUBJECTIVE AND OBJECTIVE BOX
Im ok    VITAL SIGNS    Telemetry:  nsr 58    Vital Signs Last 24 Hrs  T(C): 36.4 (20 @ 07:16), Max: 36.7 (20 @ 23:06)  T(F): 97.6 (20 @ 07:16), Max: 98.1 (20 @ 23:06)  HR: 58 (20 @ 07:16) (53 - 58)  BP: 136/69 (20 @ 07:16) (99/56 - 136/69)  RR: 20 (20 @ 07:16) (18 - 20)  SpO2: 98% (20 @ 07:16) (94% - 98%)                    07:01  -   @ 07:00  --------------------------------------------------------  IN: 1680 mL / OUT: 1450 mL / NET: 230 mL     @ 07:01  -   @ 11:09  --------------------------------------------------------  IN: 720 mL / OUT: 1000 mL / NET: -280 mL          Daily     Daily Weight in k.5 (2020 07:16)            CAPILLARY BLOOD GLUCOSE                Drains:        Pacing Wires        [  ]   Settings:                                  Isolated  [x ]    Coumadin    [ ] YES          [  ]x      NO                                   PHYSICAL EXAM        Neurology: alert and oriented x 3, nonfocal, no gross deficits  CV : s1 s2 RRR  Sternal Wound :  CDI , Stable  Lungs: cta  Abdomen: soft, nontender, nondistended, positive bowel sounds, last bowel movement +                        :    voiding      Extremities:     tRACE  edema   /  -   calve tenderness ,            acetaminophen   Tablet .. 650 milliGRAM(s) Oral every 6 hours PRN  aMIOdarone    Tablet   Oral   aMIOdarone    Tablet 400 milliGRAM(s) Oral every 8 hours  aspirin enteric coated 325 milliGRAM(s) Oral daily  atorvastatin 40 milliGRAM(s) Oral at bedtime  chlorhexidine 2% Cloths 1 Application(s) Topical <User Schedule>  cyclobenzaprine 10 milliGRAM(s) Oral three times a day PRN  DULoxetine 60 milliGRAM(s) Oral two times a day  furosemide    Tablet 40 milliGRAM(s) Oral daily  lamoTRIgine 150 milliGRAM(s) Oral every 12 hours  levothyroxine 150 MICROGram(s) Oral daily  LORazepam     Tablet 0.5 milliGRAM(s) Oral every 8 hours PRN  metoprolol tartrate 50 milliGRAM(s) Oral every 12 hours  ondansetron Injectable 4 milliGRAM(s) IV Push every 6 hours PRN  oxyCODONE    IR 10 milliGRAM(s) Oral every 4 hours PRN  pantoprazole  Injectable 40 milliGRAM(s) IV Push daily  polyethylene glycol 3350 17 Gram(s) Oral daily  sodium chloride 0.9% lock flush 3 milliLiter(s) IV Push every 8 hours  sorbitol 70% Solution 30 milliLiter(s) Oral once                    Physical Therapy Rec:   Home  [  ]   Home w/ PT  [  ]  Rehab  [  ]  Discussed with Cardiothoracic Team at AM rounds.

## 2020-02-26 DIAGNOSIS — E87.6 HYPOKALEMIA: ICD-10-CM

## 2020-02-26 LAB
ALBUMIN SERPL ELPH-MCNC: 3.7 G/DL — SIGNIFICANT CHANGE UP (ref 3.3–5)
ALP SERPL-CCNC: 61 U/L — SIGNIFICANT CHANGE UP (ref 40–120)
ALT FLD-CCNC: 20 U/L — SIGNIFICANT CHANGE UP (ref 10–45)
ANION GAP SERPL CALC-SCNC: 14 MMOL/L — SIGNIFICANT CHANGE UP (ref 5–17)
AST SERPL-CCNC: 13 U/L — SIGNIFICANT CHANGE UP (ref 10–40)
BASOPHILS # BLD AUTO: 0.03 K/UL — SIGNIFICANT CHANGE UP (ref 0–0.2)
BASOPHILS NFR BLD AUTO: 0.5 % — SIGNIFICANT CHANGE UP (ref 0–2)
BILIRUB SERPL-MCNC: 0.6 MG/DL — SIGNIFICANT CHANGE UP (ref 0.2–1.2)
BUN SERPL-MCNC: 18 MG/DL — SIGNIFICANT CHANGE UP (ref 7–23)
CALCIUM SERPL-MCNC: 9.5 MG/DL — SIGNIFICANT CHANGE UP (ref 8.4–10.5)
CHLORIDE SERPL-SCNC: 98 MMOL/L — SIGNIFICANT CHANGE UP (ref 96–108)
CO2 SERPL-SCNC: 29 MMOL/L — SIGNIFICANT CHANGE UP (ref 22–31)
CREAT SERPL-MCNC: 0.8 MG/DL — SIGNIFICANT CHANGE UP (ref 0.5–1.3)
EOSINOPHIL # BLD AUTO: 0.14 K/UL — SIGNIFICANT CHANGE UP (ref 0–0.5)
EOSINOPHIL NFR BLD AUTO: 2.1 % — SIGNIFICANT CHANGE UP (ref 0–6)
GLUCOSE SERPL-MCNC: 121 MG/DL — HIGH (ref 70–99)
HCT VFR BLD CALC: 28.6 % — LOW (ref 34.5–45)
HGB BLD-MCNC: 8.9 G/DL — LOW (ref 11.5–15.5)
IMM GRANULOCYTES NFR BLD AUTO: 0.9 % — SIGNIFICANT CHANGE UP (ref 0–1.5)
LYMPHOCYTES # BLD AUTO: 1.02 K/UL — SIGNIFICANT CHANGE UP (ref 1–3.3)
LYMPHOCYTES # BLD AUTO: 15.6 % — SIGNIFICANT CHANGE UP (ref 13–44)
MAGNESIUM SERPL-MCNC: 2 MG/DL — SIGNIFICANT CHANGE UP (ref 1.6–2.6)
MCHC RBC-ENTMCNC: 31.1 GM/DL — LOW (ref 32–36)
MCHC RBC-ENTMCNC: 31.4 PG — SIGNIFICANT CHANGE UP (ref 27–34)
MCV RBC AUTO: 101.1 FL — HIGH (ref 80–100)
MONOCYTES # BLD AUTO: 0.79 K/UL — SIGNIFICANT CHANGE UP (ref 0–0.9)
MONOCYTES NFR BLD AUTO: 12.1 % — SIGNIFICANT CHANGE UP (ref 2–14)
NEUTROPHILS # BLD AUTO: 4.51 K/UL — SIGNIFICANT CHANGE UP (ref 1.8–7.4)
NEUTROPHILS NFR BLD AUTO: 68.8 % — SIGNIFICANT CHANGE UP (ref 43–77)
NRBC # BLD: 0 /100 WBCS — SIGNIFICANT CHANGE UP (ref 0–0)
PLATELET # BLD AUTO: 273 K/UL — SIGNIFICANT CHANGE UP (ref 150–400)
POTASSIUM SERPL-MCNC: 4.5 MMOL/L — SIGNIFICANT CHANGE UP (ref 3.5–5.3)
POTASSIUM SERPL-SCNC: 4.5 MMOL/L — SIGNIFICANT CHANGE UP (ref 3.5–5.3)
PROT SERPL-MCNC: 6.5 G/DL — SIGNIFICANT CHANGE UP (ref 6–8.3)
RBC # BLD: 2.83 M/UL — LOW (ref 3.8–5.2)
RBC # FLD: 13.9 % — SIGNIFICANT CHANGE UP (ref 10.3–14.5)
SODIUM SERPL-SCNC: 141 MMOL/L — SIGNIFICANT CHANGE UP (ref 135–145)
WBC # BLD: 6.55 K/UL — SIGNIFICANT CHANGE UP (ref 3.8–10.5)
WBC # FLD AUTO: 6.55 K/UL — SIGNIFICANT CHANGE UP (ref 3.8–10.5)

## 2020-02-26 RX ORDER — PANTOPRAZOLE SODIUM 20 MG/1
40 TABLET, DELAYED RELEASE ORAL
Refills: 0 | Status: DISCONTINUED | OUTPATIENT
Start: 2020-02-26 | End: 2020-03-01

## 2020-02-26 RX ORDER — ENOXAPARIN SODIUM 100 MG/ML
40 INJECTION SUBCUTANEOUS
Refills: 0 | Status: DISCONTINUED | OUTPATIENT
Start: 2020-02-26 | End: 2020-03-01

## 2020-02-26 RX ADMIN — Medication 40 MILLIGRAM(S): at 06:02

## 2020-02-26 RX ADMIN — OXYCODONE HYDROCHLORIDE 10 MILLIGRAM(S): 5 TABLET ORAL at 11:52

## 2020-02-26 RX ADMIN — DULOXETINE HYDROCHLORIDE 60 MILLIGRAM(S): 30 CAPSULE, DELAYED RELEASE ORAL at 18:21

## 2020-02-26 RX ADMIN — Medication 50 MILLIGRAM(S): at 18:21

## 2020-02-26 RX ADMIN — SODIUM CHLORIDE 3 MILLILITER(S): 9 INJECTION INTRAMUSCULAR; INTRAVENOUS; SUBCUTANEOUS at 13:40

## 2020-02-26 RX ADMIN — SODIUM CHLORIDE 3 MILLILITER(S): 9 INJECTION INTRAMUSCULAR; INTRAVENOUS; SUBCUTANEOUS at 06:01

## 2020-02-26 RX ADMIN — LAMOTRIGINE 150 MILLIGRAM(S): 25 TABLET, ORALLY DISINTEGRATING ORAL at 00:49

## 2020-02-26 RX ADMIN — Medication 50 MILLIGRAM(S): at 06:02

## 2020-02-26 RX ADMIN — SODIUM CHLORIDE 3 MILLILITER(S): 9 INJECTION INTRAMUSCULAR; INTRAVENOUS; SUBCUTANEOUS at 21:04

## 2020-02-26 RX ADMIN — Medication 150 MICROGRAM(S): at 06:02

## 2020-02-26 RX ADMIN — OXYCODONE HYDROCHLORIDE 10 MILLIGRAM(S): 5 TABLET ORAL at 12:25

## 2020-02-26 RX ADMIN — ATORVASTATIN CALCIUM 40 MILLIGRAM(S): 80 TABLET, FILM COATED ORAL at 21:02

## 2020-02-26 RX ADMIN — Medication 325 MILLIGRAM(S): at 11:52

## 2020-02-26 RX ADMIN — LAMOTRIGINE 150 MILLIGRAM(S): 25 TABLET, ORALLY DISINTEGRATING ORAL at 13:41

## 2020-02-26 RX ADMIN — AMIODARONE HYDROCHLORIDE 200 MILLIGRAM(S): 400 TABLET ORAL at 06:02

## 2020-02-26 RX ADMIN — Medication 0.5 MILLIGRAM(S): at 06:14

## 2020-02-26 RX ADMIN — ENOXAPARIN SODIUM 40 MILLIGRAM(S): 100 INJECTION SUBCUTANEOUS at 18:21

## 2020-02-26 RX ADMIN — Medication 0.5 MILLIGRAM(S): at 18:21

## 2020-02-26 RX ADMIN — DULOXETINE HYDROCHLORIDE 60 MILLIGRAM(S): 30 CAPSULE, DELAYED RELEASE ORAL at 06:02

## 2020-02-26 NOTE — PROGRESS NOTE ADULT - PROBLEM SELECTOR PLAN 1
s/p AVR and ascending aorta replacement   Continue with Lopressor 50 mg PO BID   Continue with  mg PO Daily   Continue withy Amio 200 mg PO Daily   Continue with Aldactone 25 mg PO BID and Lasix 40 mg PO daily   Pain management--chronic pain pt, on oxycodone and Tylenol with positive results, no longer on methadone   Chest PT and incentive spirometry  Increase ambulation and activity   Shower POD 5  DVT and GI prophylaxis   Discharge planning-home when stable

## 2020-02-26 NOTE — PROGRESS NOTE ADULT - SUBJECTIVE AND OBJECTIVE BOX
VITAL SIGNS    Subjective: "I'm feeling ok." Denies CP, palpitation, SOB, GONZALEZ, HA, dizziness, N/V/D, fever or chills.  No acute event noted overnight.     Telemetry: NSR 50-60     Vital Signs Last 24 Hrs  T(C): 36.6 (20 @ 05:15), Max: 36.6 (20 @ 15:30)  T(F): 97.9 (20 @ 05:15), Max: 97.9 (20 @ 15:30)  HR: 62 (20 @ 05:15) (62 - 68)  BP: 150/87 (20 @ 05:15) (113/68 - 150/87)  RR: 18 (20 @ 05:15) (18 - 18)  SpO2: 95% (20 @ 05:15) (95% - 95%)            07:01  -   @ 07:00  --------------------------------------------------------  IN: 1080 mL / OUT: 1401 mL / NET: -321 mL     @ 07:01  -   @ 09:48  --------------------------------------------------------  IN: 0 mL / OUT: 300 mL / NET: -300 mL    Daily     Daily Weight in k.6 (2020 08:38)    CAPILLARY BLOOD GLUCOSE    PHYSICAL EXAM    Neurology: alert and oriented x 3, nonfocal, no gross deficits    CV: (+) S1 and S2, No murmurs, rubs, gallops or clicks     Sternal Wound:  MSI -->CDI , sternum stable    Lungs: CTA B/L     Abdomen: soft, nontender, nondistended, positive bowel sounds, (+) Flatus; (+) BM     :  Voiding               Extremities:  B/L LE (+) 1 edema; negative calf tenderness; (+) 2 DP palpable        acetaminophen Tablet .. 650 milliGRAM(s) Oral every 6 hours PRN  aMIOdarone Tablet 200 milliGRAM(s) Oral daily  aspirin enteric coated 325 milliGRAM(s) Oral daily  atorvastatin 40 milliGRAM(s) Oral at bedtime  chlorhexidine 2% Cloths 1 Application(s) Topical <User Schedule>  cyclobenzaprine 10 milliGRAM(s) Oral three times a day PRN  DULoxetine 60 milliGRAM(s) Oral two times a day  enoxaparin Injectable 40 milliGRAM(s) SubCutaneous daily  furosemide Tablet 40 milliGRAM(s) Oral daily  lamoTRIgine 150 milliGRAM(s) Oral every 12 hours  levothyroxine 150 MICROGram(s) Oral daily  LORazepam Tablet 0.5 milliGRAM(s) Oral every 8 hours PRN  metoprolol tartrate 50 milliGRAM(s) Oral every 12 hours  ondansetron Injectable 4 milliGRAM(s) IV Push every 6 hours PRN  oxyCODONE IR 10 milliGRAM(s) Oral every 4 hours PRN  pantoprazole  Injectable 40 milliGRAM(s) IV Push daily  polyethylene glycol 3350 17 Gram(s) Oral daily  sorbitol 70% Solution 30 milliLiter(s) Oral once    Physical Therapy Rec:   Home  [  ]   Home w/ PT  [ X ]  Rehab  [  ]    Discussed with Cardiothoracic Team at AM rounds.

## 2020-02-26 NOTE — PROGRESS NOTE ADULT - ASSESSMENT
68 yo female, PMH HTN, HLD, bipolar disorder, fibromyalgia, hypothyroid, chronic pain due to OA bilateral knees and hips, SLE, RA, known moderate AS, incidental finding dilation of ascending aorta 5.1cm on CT scan and aortic/thoracic aneurysm, had pre-op cardiac cath 11/5/19 (normal coronaries), pt. feel on left hip and suffered fracture, and surgery postponed. Pt. presents for scheduled Aortic Valve Replacement, Ascending Aortic Replacement.   2/19: AVR (tissue) and ascending aorta replacement with Dr. Alvares. Received intra-op products: 1 Cryo, 1 platelet, 1000 FEIBA. Recovered in the CTU.   2/20: extubated. thrombocytopenia noted. Pain management. Methadone started per Dr. Alvares.   2/21: Methadone dose decreased. PCA discontinued. CT remain in with minimal drainage.   2/22: ROBER-Lopressor and Amio load initiated. CT removed. Monsivais removed-voiding. Transferred to SDU. Remains in rate controlled AF in the 80s. PW on a VVI backup. Methadone dc'd, pain managed with oral pain meds. Discharge planning-home when medically stable.  2/23 Pt still in Afib rate controlled x 24 hrs converted to SR 1st deg. @ 10m - given mg 2gm iv & BB increased to tid -on amio load- d/c mars  no heparin gtt or coumadin @ this time per dr. alvares - start lvx 30 bid  d/c planning  2/24 NSR, episodes of pacing yesterday.  Pacer decreased to 30. Amio load completes by tomorrow. Pain management  2/25 SB, decrease  lopressor 50q12, amio 200. Cont diuresis.  2/26 VVS; Continue with current medication regimen.  Supplement K+ maintain a K+ level > 4.  PW D/C this AM by Dr. Alvares.    Disposition: Home PT Thursday / Friday 68 yo female, PMH HTN, HLD, bipolar disorder, fibromyalgia, hypothyroid, chronic pain due to OA bilateral knees and hips, SLE, RA, known moderate AS, incidental finding dilation of ascending aorta 5.1cm on CT scan and aortic/thoracic aneurysm, had pre-op cardiac cath 11/5/19 (normal coronaries), pt. feel on left hip and suffered fracture, and surgery postponed. Pt. presents for scheduled Aortic Valve Replacement, Ascending Aortic Replacement.   2/19: AVR (tissue) and ascending aorta replacement with Dr. Alvares. Received intra-op products: 1 Cryo, 1 platelet, 1000 FEIBA. Recovered in the CTU.   2/20: extubated. thrombocytopenia noted. Pain management. Methadone started per Dr. Alvares.   2/21: Methadone dose decreased. PCA discontinued. CT remain in with minimal drainage.   2/22: ROBER-Lopressor and Amio load initiated. CT removed. Monsivais removed-voiding. Transferred to SDU. Remains in rate controlled AF in the 80s. PW on a VVI backup. Methadone dc'd, pain managed with oral pain meds. Discharge planning-home when medically stable.  2/23 Pt still in Afib rate controlled x 24 hrs converted to SR 1st deg. @ 10m - given mg 2gm iv & BB increased to tid -on amio load- d/c mars  no heparin gtt or coumadin @ this time per dr. alvares - start lvx 30 bid  d/c planning  2/24 NSR, episodes of pacing yesterday.  Pacer decreased to 30. Amio load completes by tomorrow. Pain management  2/25 SB, decrease  lopressor 50q12, amio 200. Cont diuresis.  2/26 VVS; Continue with current medication regimen. PW D/C this AM by Dr. Alvares.    Disposition: Home PT Thursday / Friday

## 2020-02-26 NOTE — PROGRESS NOTE ADULT - PROBLEM SELECTOR PLAN 2
New onset of ROBER on 2/22--rate controlled in the 80s x 24hrs  SR 1st deg. @ 10am 2/23  Continue with Lopressor 50 mg PO BID   Amio load initiated completed 5 Gram load; now on Maintenance 200 mg PO Daily   Continue to monitor on tele

## 2020-02-27 ENCOUNTER — TRANSCRIPTION ENCOUNTER (OUTPATIENT)
Age: 70
End: 2020-02-27

## 2020-02-27 LAB
ANION GAP SERPL CALC-SCNC: 16 MMOL/L — SIGNIFICANT CHANGE UP (ref 5–17)
BUN SERPL-MCNC: 13 MG/DL — SIGNIFICANT CHANGE UP (ref 7–23)
CALCIUM SERPL-MCNC: 9.3 MG/DL — SIGNIFICANT CHANGE UP (ref 8.4–10.5)
CHLORIDE SERPL-SCNC: 96 MMOL/L — SIGNIFICANT CHANGE UP (ref 96–108)
CO2 SERPL-SCNC: 28 MMOL/L — SIGNIFICANT CHANGE UP (ref 22–31)
CREAT SERPL-MCNC: 0.68 MG/DL — SIGNIFICANT CHANGE UP (ref 0.5–1.3)
GLUCOSE SERPL-MCNC: 115 MG/DL — HIGH (ref 70–99)
HCT VFR BLD CALC: 29.1 % — LOW (ref 34.5–45)
HGB BLD-MCNC: 8.8 G/DL — LOW (ref 11.5–15.5)
MAGNESIUM SERPL-MCNC: 2 MG/DL — SIGNIFICANT CHANGE UP (ref 1.6–2.6)
MCHC RBC-ENTMCNC: 30.2 GM/DL — LOW (ref 32–36)
MCHC RBC-ENTMCNC: 31 PG — SIGNIFICANT CHANGE UP (ref 27–34)
MCV RBC AUTO: 102.5 FL — HIGH (ref 80–100)
NRBC # BLD: 0 /100 WBCS — SIGNIFICANT CHANGE UP (ref 0–0)
PLATELET # BLD AUTO: 247 K/UL — SIGNIFICANT CHANGE UP (ref 150–400)
POTASSIUM SERPL-MCNC: 3.8 MMOL/L — SIGNIFICANT CHANGE UP (ref 3.5–5.3)
POTASSIUM SERPL-SCNC: 3.8 MMOL/L — SIGNIFICANT CHANGE UP (ref 3.5–5.3)
RBC # BLD: 2.84 M/UL — LOW (ref 3.8–5.2)
RBC # FLD: 14.3 % — SIGNIFICANT CHANGE UP (ref 10.3–14.5)
SODIUM SERPL-SCNC: 140 MMOL/L — SIGNIFICANT CHANGE UP (ref 135–145)
WBC # BLD: 6.74 K/UL — SIGNIFICANT CHANGE UP (ref 3.8–10.5)
WBC # FLD AUTO: 6.74 K/UL — SIGNIFICANT CHANGE UP (ref 3.8–10.5)

## 2020-02-27 PROCEDURE — 71045 X-RAY EXAM CHEST 1 VIEW: CPT | Mod: 26

## 2020-02-27 RX ADMIN — ENOXAPARIN SODIUM 40 MILLIGRAM(S): 100 INJECTION SUBCUTANEOUS at 05:17

## 2020-02-27 RX ADMIN — SODIUM CHLORIDE 3 MILLILITER(S): 9 INJECTION INTRAMUSCULAR; INTRAVENOUS; SUBCUTANEOUS at 05:23

## 2020-02-27 RX ADMIN — PANTOPRAZOLE SODIUM 40 MILLIGRAM(S): 20 TABLET, DELAYED RELEASE ORAL at 05:17

## 2020-02-27 RX ADMIN — ENOXAPARIN SODIUM 40 MILLIGRAM(S): 100 INJECTION SUBCUTANEOUS at 17:19

## 2020-02-27 RX ADMIN — Medication 325 MILLIGRAM(S): at 13:21

## 2020-02-27 RX ADMIN — OXYCODONE HYDROCHLORIDE 10 MILLIGRAM(S): 5 TABLET ORAL at 13:50

## 2020-02-27 RX ADMIN — SODIUM CHLORIDE 3 MILLILITER(S): 9 INJECTION INTRAMUSCULAR; INTRAVENOUS; SUBCUTANEOUS at 13:20

## 2020-02-27 RX ADMIN — Medication 150 MICROGRAM(S): at 05:17

## 2020-02-27 RX ADMIN — Medication 50 MILLIGRAM(S): at 17:19

## 2020-02-27 RX ADMIN — ATORVASTATIN CALCIUM 40 MILLIGRAM(S): 80 TABLET, FILM COATED ORAL at 22:35

## 2020-02-27 RX ADMIN — Medication 0.5 MILLIGRAM(S): at 05:17

## 2020-02-27 RX ADMIN — DULOXETINE HYDROCHLORIDE 60 MILLIGRAM(S): 30 CAPSULE, DELAYED RELEASE ORAL at 05:17

## 2020-02-27 RX ADMIN — Medication 0.5 MILLIGRAM(S): at 19:11

## 2020-02-27 RX ADMIN — DULOXETINE HYDROCHLORIDE 60 MILLIGRAM(S): 30 CAPSULE, DELAYED RELEASE ORAL at 17:19

## 2020-02-27 RX ADMIN — Medication 50 MILLIGRAM(S): at 05:17

## 2020-02-27 RX ADMIN — LAMOTRIGINE 150 MILLIGRAM(S): 25 TABLET, ORALLY DISINTEGRATING ORAL at 00:43

## 2020-02-27 RX ADMIN — AMIODARONE HYDROCHLORIDE 200 MILLIGRAM(S): 400 TABLET ORAL at 05:17

## 2020-02-27 RX ADMIN — CHLORHEXIDINE GLUCONATE 1 APPLICATION(S): 213 SOLUTION TOPICAL at 10:12

## 2020-02-27 RX ADMIN — OXYCODONE HYDROCHLORIDE 10 MILLIGRAM(S): 5 TABLET ORAL at 13:20

## 2020-02-27 RX ADMIN — LAMOTRIGINE 150 MILLIGRAM(S): 25 TABLET, ORALLY DISINTEGRATING ORAL at 13:21

## 2020-02-27 RX ADMIN — OXYCODONE HYDROCHLORIDE 10 MILLIGRAM(S): 5 TABLET ORAL at 07:51

## 2020-02-27 RX ADMIN — OXYCODONE HYDROCHLORIDE 10 MILLIGRAM(S): 5 TABLET ORAL at 08:21

## 2020-02-27 RX ADMIN — SODIUM CHLORIDE 3 MILLILITER(S): 9 INJECTION INTRAMUSCULAR; INTRAVENOUS; SUBCUTANEOUS at 22:34

## 2020-02-27 NOTE — DISCHARGE NOTE NURSING/CASE MANAGEMENT/SOCIAL WORK - PATIENT PORTAL LINK FT
You can access the FollowMyHealth Patient Portal offered by [facility] by registering at the following website: [email or web address]. By joining [employer]’s FollowMyHealth portal, you will also be able to view your health information using other applications (apps) compatible with our system.

## 2020-02-27 NOTE — PROGRESS NOTE ADULT - REASON FOR ADMISSION
AVR / Ascending aorta replacement
AVR / Ascending aorta replacement
SOB
aortic aneurysm
Cardiac Surgery
cp
sob

## 2020-02-27 NOTE — PROGRESS NOTE ADULT - SUBJECTIVE AND OBJECTIVE BOX
VITAL SIGNS-Telemetry:  SR 50-60  Vital Signs Last 24 Hrs  T(C): 36.9 (02-27-20 @ 05:11), Max: 36.9 (02-27-20 @ 05:11)  T(F): 98.4 (02-27-20 @ 05:11), Max: 98.4 (02-27-20 @ 05:11)  HR: 63 (02-27-20 @ 05:11) (57 - 71)  BP: 136/84 (02-27-20 @ 05:11) (127/77 - 162/71)  RR: 18 (02-27-20 @ 05:11) (18 - 18)  SpO2: 94% (02-27-20 @ 05:11) (94% - 97%)         02-26 @ 07:01  -  02-27 @ 07:00  --------------------------------------------------------  IN: 740 mL / OUT: 900 mL / NET: -160 mL    02-27 @ 07:01  -  02-27 @ 09:34  --------------------------------------------------------  IN: 0 mL / OUT: 1 mL / NET: -1 mL        Daily     Daily        PHYSICAL EXAM:  Neurology: alert and oriented x 3, nonfocal, no gross deficits  CV : S1S2  Sternal Wound :  CDI , Stable  Lungs: CTA  Abdomen: soft, nontender, nondistended, positive bowel sounds, last bowel movement    +bm     Extremities:     + edema b/l improving no calf tenderness    acetaminophen   Tablet .. 650 milliGRAM(s) Oral every 6 hours PRN  aMIOdarone    Tablet   Oral   aMIOdarone    Tablet 200 milliGRAM(s) Oral daily  aspirin enteric coated 325 milliGRAM(s) Oral daily  atorvastatin 40 milliGRAM(s) Oral at bedtime  chlorhexidine 2% Cloths 1 Application(s) Topical <User Schedule>  cyclobenzaprine 10 milliGRAM(s) Oral three times a day PRN  DULoxetine 60 milliGRAM(s) Oral two times a day  enoxaparin Injectable 40 milliGRAM(s) SubCutaneous two times a day  furosemide    Tablet 40 milliGRAM(s) Oral daily  lamoTRIgine 150 milliGRAM(s) Oral every 12 hours  levothyroxine 150 MICROGram(s) Oral daily  LORazepam     Tablet 0.5 milliGRAM(s) Oral every 8 hours PRN  metoprolol tartrate 50 milliGRAM(s) Oral every 12 hours  ondansetron Injectable 4 milliGRAM(s) IV Push every 6 hours PRN  oxyCODONE    IR 10 milliGRAM(s) Oral every 4 hours PRN  pantoprazole    Tablet 40 milliGRAM(s) Oral before breakfast  polyethylene glycol 3350 17 Gram(s) Oral daily  sodium chloride 0.9% lock flush 3 milliLiter(s) IV Push every 8 hours  sorbitol 70% Solution 30 milliLiter(s) Oral once      Physical Therapy Rec:   Home  [  ]   Home w/ PT  [x  ]  Rehab  [  ]  Discussed with Cardiothoracic Team at AM rounds.

## 2020-02-27 NOTE — PROGRESS NOTE ADULT - ASSESSMENT
69F-PMH HTN, HLD, bipolar disorder, fibromyalgia, hypothyroid, chronic pain due to OA bilateral knees and hips, SLE, RA, known moderate AS, incidental finding dilation of ascending aorta 5.1cm on CT scan and aortic/thoracic aneurysm, had pre-op cardiac cath 11/5/19 (normal coronaries), pt. feel on left hip and suffered fracture, and surgery postponed. Pt. presents for scheduled Aortic Valve Replacement, Ascending Aortic Replacement.   2/19: AVR (tissue) and ascending aorta replacement with Dr. Alvares. Received intra-op products: 1 Cryo, 1 platelet, 1000 FEIBA. Recovered in the CTU.   2/20: extubated. thrombocytopenia noted. Pain management. Methadone started per Dr. Alvares.   2/21: Methadone dose decreased. PCA discontinued. CT remain in with minimal drainage.   2/22: ROBER-Lopressor and Amio load initiated. CT removed. Monsivais removed-voiding. Transferred to SDU. Remains in rate controlled AF in the 80s. PW on a VVI backup. Methadone dc'd, pain managed with oral pain meds. Discharge planning-home when medically stable.  2/23 Pt still in Afib rate controlled x 24 hrs converted to SR 1st deg. @ 10m - given mg 2gm iv & BB increased to tid -on amio load- d/c mars  no heparin gtt or coumadin @ this time per dr. alvares - start lvx 30 bid  d/c planning  2/24 NSR, episodes of pacing yesterday.  Pacer decreased to 30. Amio load completes by tomorrow. Pain management  2/25 SB, decrease  lopressor 50q12, amio 200. Cont diuresis.  2/26 VVS; Continue with current medication regimen. PW D/C this AM by Dr. Alvares.    Disposition: Home PT Friday -rounds made w/ Dr. Werner Alvares

## 2020-02-28 LAB
ANION GAP SERPL CALC-SCNC: 15 MMOL/L — SIGNIFICANT CHANGE UP (ref 5–17)
BUN SERPL-MCNC: 13 MG/DL — SIGNIFICANT CHANGE UP (ref 7–23)
CALCIUM SERPL-MCNC: 9.2 MG/DL — SIGNIFICANT CHANGE UP (ref 8.4–10.5)
CHLORIDE SERPL-SCNC: 96 MMOL/L — SIGNIFICANT CHANGE UP (ref 96–108)
CO2 SERPL-SCNC: 26 MMOL/L — SIGNIFICANT CHANGE UP (ref 22–31)
CREAT SERPL-MCNC: 0.78 MG/DL — SIGNIFICANT CHANGE UP (ref 0.5–1.3)
GLUCOSE SERPL-MCNC: 108 MG/DL — HIGH (ref 70–99)
HCT VFR BLD CALC: 31 % — LOW (ref 34.5–45)
HGB BLD-MCNC: 9.4 G/DL — LOW (ref 11.5–15.5)
MAGNESIUM SERPL-MCNC: 2.1 MG/DL — SIGNIFICANT CHANGE UP (ref 1.6–2.6)
MCHC RBC-ENTMCNC: 30.3 GM/DL — LOW (ref 32–36)
MCHC RBC-ENTMCNC: 31 PG — SIGNIFICANT CHANGE UP (ref 27–34)
MCV RBC AUTO: 102.3 FL — HIGH (ref 80–100)
NRBC # BLD: 0 /100 WBCS — SIGNIFICANT CHANGE UP (ref 0–0)
PLATELET # BLD AUTO: 314 K/UL — SIGNIFICANT CHANGE UP (ref 150–400)
POTASSIUM SERPL-MCNC: 3.6 MMOL/L — SIGNIFICANT CHANGE UP (ref 3.5–5.3)
POTASSIUM SERPL-MCNC: 4 MMOL/L — SIGNIFICANT CHANGE UP (ref 3.5–5.3)
POTASSIUM SERPL-SCNC: 3.6 MMOL/L — SIGNIFICANT CHANGE UP (ref 3.5–5.3)
POTASSIUM SERPL-SCNC: 4 MMOL/L — SIGNIFICANT CHANGE UP (ref 3.5–5.3)
RBC # BLD: 3.03 M/UL — LOW (ref 3.8–5.2)
RBC # FLD: 14.6 % — HIGH (ref 10.3–14.5)
SODIUM SERPL-SCNC: 137 MMOL/L — SIGNIFICANT CHANGE UP (ref 135–145)
WBC # BLD: 6.46 K/UL — SIGNIFICANT CHANGE UP (ref 3.8–10.5)
WBC # FLD AUTO: 6.46 K/UL — SIGNIFICANT CHANGE UP (ref 3.8–10.5)

## 2020-02-28 PROCEDURE — 93010 ELECTROCARDIOGRAM REPORT: CPT

## 2020-02-28 RX ORDER — AMIODARONE HYDROCHLORIDE 400 MG/1
200 TABLET ORAL DAILY
Refills: 0 | Status: CANCELLED | OUTPATIENT
Start: 2020-03-04 | End: 2020-03-01

## 2020-02-28 RX ORDER — SPIRONOLACTONE 25 MG/1
25 TABLET, FILM COATED ORAL DAILY
Refills: 0 | Status: DISCONTINUED | OUTPATIENT
Start: 2020-02-28 | End: 2020-02-29

## 2020-02-28 RX ORDER — MAGNESIUM SULFATE 500 MG/ML
2 VIAL (ML) INJECTION ONCE
Refills: 0 | Status: COMPLETED | OUTPATIENT
Start: 2020-02-28 | End: 2020-02-28

## 2020-02-28 RX ORDER — AMIODARONE HYDROCHLORIDE 400 MG/1
400 TABLET ORAL EVERY 8 HOURS
Refills: 0 | Status: DISCONTINUED | OUTPATIENT
Start: 2020-02-28 | End: 2020-03-01

## 2020-02-28 RX ORDER — POTASSIUM BICARBONATE 978 MG/1
25 TABLET, EFFERVESCENT ORAL
Refills: 0 | Status: COMPLETED | OUTPATIENT
Start: 2020-02-28 | End: 2020-02-28

## 2020-02-28 RX ORDER — METOPROLOL TARTRATE 50 MG
50 TABLET ORAL EVERY 8 HOURS
Refills: 0 | Status: DISCONTINUED | OUTPATIENT
Start: 2020-02-28 | End: 2020-02-29

## 2020-02-28 RX ORDER — FUROSEMIDE 40 MG
20 TABLET ORAL DAILY
Refills: 0 | Status: DISCONTINUED | OUTPATIENT
Start: 2020-02-28 | End: 2020-02-29

## 2020-02-28 RX ADMIN — Medication 0.5 MILLIGRAM(S): at 05:24

## 2020-02-28 RX ADMIN — OXYCODONE HYDROCHLORIDE 10 MILLIGRAM(S): 5 TABLET ORAL at 08:15

## 2020-02-28 RX ADMIN — Medication 50 GRAM(S): at 14:00

## 2020-02-28 RX ADMIN — Medication 150 MICROGRAM(S): at 05:13

## 2020-02-28 RX ADMIN — ENOXAPARIN SODIUM 40 MILLIGRAM(S): 100 INJECTION SUBCUTANEOUS at 05:12

## 2020-02-28 RX ADMIN — OXYCODONE HYDROCHLORIDE 10 MILLIGRAM(S): 5 TABLET ORAL at 12:07

## 2020-02-28 RX ADMIN — LAMOTRIGINE 150 MILLIGRAM(S): 25 TABLET, ORALLY DISINTEGRATING ORAL at 01:00

## 2020-02-28 RX ADMIN — DULOXETINE HYDROCHLORIDE 60 MILLIGRAM(S): 30 CAPSULE, DELAYED RELEASE ORAL at 16:31

## 2020-02-28 RX ADMIN — POTASSIUM BICARBONATE 25 MILLIEQUIVALENT(S): 978 TABLET, EFFERVESCENT ORAL at 07:45

## 2020-02-28 RX ADMIN — SODIUM CHLORIDE 3 MILLILITER(S): 9 INJECTION INTRAMUSCULAR; INTRAVENOUS; SUBCUTANEOUS at 13:34

## 2020-02-28 RX ADMIN — Medication 50 MILLIGRAM(S): at 13:39

## 2020-02-28 RX ADMIN — OXYCODONE HYDROCHLORIDE 10 MILLIGRAM(S): 5 TABLET ORAL at 21:31

## 2020-02-28 RX ADMIN — AMIODARONE HYDROCHLORIDE 400 MILLIGRAM(S): 400 TABLET ORAL at 13:39

## 2020-02-28 RX ADMIN — AMIODARONE HYDROCHLORIDE 200 MILLIGRAM(S): 400 TABLET ORAL at 05:13

## 2020-02-28 RX ADMIN — OXYCODONE HYDROCHLORIDE 10 MILLIGRAM(S): 5 TABLET ORAL at 17:00

## 2020-02-28 RX ADMIN — LAMOTRIGINE 150 MILLIGRAM(S): 25 TABLET, ORALLY DISINTEGRATING ORAL at 11:37

## 2020-02-28 RX ADMIN — Medication 325 MILLIGRAM(S): at 11:38

## 2020-02-28 RX ADMIN — Medication 50 MILLIGRAM(S): at 21:03

## 2020-02-28 RX ADMIN — SODIUM CHLORIDE 3 MILLILITER(S): 9 INJECTION INTRAMUSCULAR; INTRAVENOUS; SUBCUTANEOUS at 21:03

## 2020-02-28 RX ADMIN — DULOXETINE HYDROCHLORIDE 60 MILLIGRAM(S): 30 CAPSULE, DELAYED RELEASE ORAL at 05:13

## 2020-02-28 RX ADMIN — OXYCODONE HYDROCHLORIDE 10 MILLIGRAM(S): 5 TABLET ORAL at 07:45

## 2020-02-28 RX ADMIN — Medication 50 MILLIGRAM(S): at 05:13

## 2020-02-28 RX ADMIN — OXYCODONE HYDROCHLORIDE 10 MILLIGRAM(S): 5 TABLET ORAL at 21:01

## 2020-02-28 RX ADMIN — SODIUM CHLORIDE 3 MILLILITER(S): 9 INJECTION INTRAMUSCULAR; INTRAVENOUS; SUBCUTANEOUS at 05:11

## 2020-02-28 RX ADMIN — PANTOPRAZOLE SODIUM 40 MILLIGRAM(S): 20 TABLET, DELAYED RELEASE ORAL at 05:13

## 2020-02-28 RX ADMIN — POTASSIUM BICARBONATE 25 MILLIEQUIVALENT(S): 978 TABLET, EFFERVESCENT ORAL at 07:44

## 2020-02-28 RX ADMIN — OXYCODONE HYDROCHLORIDE 10 MILLIGRAM(S): 5 TABLET ORAL at 11:37

## 2020-02-28 RX ADMIN — OXYCODONE HYDROCHLORIDE 10 MILLIGRAM(S): 5 TABLET ORAL at 16:30

## 2020-02-28 RX ADMIN — Medication 0.5 MILLIGRAM(S): at 13:39

## 2020-02-28 RX ADMIN — POTASSIUM BICARBONATE 25 MILLIEQUIVALENT(S): 978 TABLET, EFFERVESCENT ORAL at 11:01

## 2020-02-28 RX ADMIN — AMIODARONE HYDROCHLORIDE 400 MILLIGRAM(S): 400 TABLET ORAL at 21:01

## 2020-02-28 RX ADMIN — ENOXAPARIN SODIUM 40 MILLIGRAM(S): 100 INJECTION SUBCUTANEOUS at 16:31

## 2020-02-28 RX ADMIN — ATORVASTATIN CALCIUM 40 MILLIGRAM(S): 80 TABLET, FILM COATED ORAL at 21:01

## 2020-02-28 NOTE — PROGRESS NOTE ADULT - PROBLEM SELECTOR PLAN 1
s/p AVR and ascending aorta replacement   increase lopressor 50 mg po tid   Continue with  mg PO Daily   increase amio 400 tid   diuresis with lasix 20 qd and ald 25 qd   Pain management--chronic pain pt, on oxycodone and Tylenol with positive results, no longer on methadone   Chest PT and incentive spirometry  Increase ambulation and activity   Shower qd  DVT and GI prophylaxis   Discharge planning-home when stable

## 2020-02-28 NOTE — PROGRESS NOTE ADULT - ASSESSMENT
69F-PMH HTN, HLD, bipolar disorder, fibromyalgia, hypothyroid, chronic pain due to OA bilateral knees and hips, SLE, RA, known moderate AS, incidental finding dilation of ascending aorta 5.1cm on CT scan and aortic/thoracic aneurysm, had pre-op cardiac cath 11/5/19 (normal coronaries), pt. feel on left hip and suffered fracture, and surgery postponed. Pt. presents for scheduled Aortic Valve Replacement, Ascending Aortic Replacement.   2/19: AVR (tissue) and ascending aorta replacement with Dr. Alvares. Received intra-op products: 1 Cryo, 1 platelet, 1000 FEIBA. Recovered in the CTU.   2/20: extubated. thrombocytopenia noted. Pain management. Methadone started per Dr. Alvares.   2/21: Methadone dose decreased. PCA discontinued. CT remain in with minimal drainage.   2/22: ROBER-Lopressor and Amio load initiated. CT removed. Monsivais removed-voiding. Transferred to SDU. Remains in rate controlled AF in the 80s. PW on a VVI backup. Methadone dc'd, pain managed with oral pain meds. Discharge planning-home when medically stable.  2/23 Pt still in Afib rate controlled x 24 hrs converted to SR 1st deg. @ 10m - given mg 2gm iv & BB increased to tid -on amio load- d/c mars  no heparin gtt or coumadin @ this time per dr. alvares - start lvx 30 bid  d/c planning  2/24 NSR, episodes of pacing yesterday.  Pacer decreased to 30. Amio load completes by tomorrow. Pain management  2/25 SB, decrease  lopressor 50q12, amio 200. Cont diuresis.  2/26 VVS; Continue with current medication regimen. PW D/C this AM by Dr. Alvares.    2/28 pt converted to afib 100-120- lovenox 40 sq bid; increase amio 400 tid and lopressor 50 mg po tid  continue to monitor; K supplemented and Mag 2 grams iv given  continue to observe

## 2020-02-28 NOTE — PROGRESS NOTE ADULT - PROBLEM SELECTOR PLAN 2
pt converted to afib 100-120- lovenox 40 sq bid; increase amio 400 tid and lopressor 50 mg po tid  continue to monitor; K supplemented and Mag 2 grams iv given  continue to observe

## 2020-02-28 NOTE — PROGRESS NOTE ADULT - SUBJECTIVE AND OBJECTIVE BOX
VITAL SIGNS    Telemetry:  rsr 50-70- pt converted to afib 100-120 at 1130 today   Vital Signs Last 24 Hrs  T(C): 36.4 (20 @ 12:59), Max: 36.9 (20 @ 04:53)  T(F): 97.6 (20 @ 12:59), Max: 98.4 (20 @ 04:53)  HR: 105 (20 @ 12:59) (64 - 108)  BP: 110/66 (20 @ 12:59) (104/62 - 149/85)  RR: 18 (20 @ 12:59) (18 - 18)  SpO2: 92% (20 @ 12:59) (92% - 96%)             @ 07:01  -   @ 07:00  --------------------------------------------------------  IN: 880 mL / OUT: 851 mL / NET: 29 mL     @ 07:01  -   @ 14:03  --------------------------------------------------------  IN: 1120 mL / OUT: 430 mL / NET: 690 mL       Daily     Daily Weight in k.5 (2020 11:13)  Admit Wt: Drug Dosing Weight  Height (cm): 165.1 (2020 07:03)  Weight (kg): 91.1 (2020 09:00)  BMI (kg/m2): 33.4 (2020 09:00)  BSA (m2): 1.98 (2020 09:00)      CAPILLARY BLOOD GLUCOSE              acetaminophen   Tablet .. 650 milliGRAM(s) Oral every 6 hours PRN  aMIOdarone    Tablet 400 milliGRAM(s) Oral every 8 hours  aspirin enteric coated 325 milliGRAM(s) Oral daily  atorvastatin 40 milliGRAM(s) Oral at bedtime  chlorhexidine 2% Cloths 1 Application(s) Topical <User Schedule>  cyclobenzaprine 10 milliGRAM(s) Oral three times a day PRN  DULoxetine 60 milliGRAM(s) Oral two times a day  enoxaparin Injectable 40 milliGRAM(s) SubCutaneous two times a day  furosemide    Tablet 20 milliGRAM(s) Oral daily  lamoTRIgine 150 milliGRAM(s) Oral every 12 hours  levothyroxine 150 MICROGram(s) Oral daily  LORazepam     Tablet 0.5 milliGRAM(s) Oral every 8 hours PRN  magnesium sulfate  IVPB 2 Gram(s) IV Intermittent once  metoprolol tartrate 50 milliGRAM(s) Oral every 8 hours  ondansetron Injectable 4 milliGRAM(s) IV Push every 6 hours PRN  oxyCODONE    IR 10 milliGRAM(s) Oral every 4 hours PRN  pantoprazole    Tablet 40 milliGRAM(s) Oral before breakfast  polyethylene glycol 3350 17 Gram(s) Oral daily  sodium chloride 0.9% lock flush 3 milliLiter(s) IV Push every 8 hours  sorbitol 70% Solution 30 milliLiter(s) Oral once  spironolactone 25 milliGRAM(s) Oral daily      PHYSICAL EXAM    Subjective: "I have to stay."   Neurology: alert and oriented x 3, nonfocal, no gross deficits  CV : tele:   rsr 50-70- pt converted to afib 100-120 at 1130 today   Sternal Wound :  CDI LISANDRO - sternum stable   Lungs: clear. RR easy, unlabored   Abdomen: soft, nontender, nondistended, positive bowel sounds, + bowel movement   Neg N/V/D   :  pt voiding without difficulty   Extremities:   OLSON; trace LE edema, neg calf tenderness.   PPP bilaterally      PW: no   Chest tubes: none

## 2020-02-29 LAB
ANION GAP SERPL CALC-SCNC: 13 MMOL/L — SIGNIFICANT CHANGE UP (ref 5–17)
BUN SERPL-MCNC: 10 MG/DL — SIGNIFICANT CHANGE UP (ref 7–23)
CALCIUM SERPL-MCNC: 9.4 MG/DL — SIGNIFICANT CHANGE UP (ref 8.4–10.5)
CHLORIDE SERPL-SCNC: 99 MMOL/L — SIGNIFICANT CHANGE UP (ref 96–108)
CO2 SERPL-SCNC: 26 MMOL/L — SIGNIFICANT CHANGE UP (ref 22–31)
CREAT SERPL-MCNC: 0.73 MG/DL — SIGNIFICANT CHANGE UP (ref 0.5–1.3)
GLUCOSE SERPL-MCNC: 109 MG/DL — HIGH (ref 70–99)
HCT VFR BLD CALC: 33.5 % — LOW (ref 34.5–45)
HGB BLD-MCNC: 10.3 G/DL — LOW (ref 11.5–15.5)
INR BLD: 1.14 RATIO — SIGNIFICANT CHANGE UP (ref 0.88–1.16)
MAGNESIUM SERPL-MCNC: 2.2 MG/DL — SIGNIFICANT CHANGE UP (ref 1.6–2.6)
MCHC RBC-ENTMCNC: 30.7 GM/DL — LOW (ref 32–36)
MCHC RBC-ENTMCNC: 31.5 PG — SIGNIFICANT CHANGE UP (ref 27–34)
MCV RBC AUTO: 102.4 FL — HIGH (ref 80–100)
NRBC # BLD: 0 /100 WBCS — SIGNIFICANT CHANGE UP (ref 0–0)
PLATELET # BLD AUTO: 381 K/UL — SIGNIFICANT CHANGE UP (ref 150–400)
POTASSIUM SERPL-MCNC: 4.3 MMOL/L — SIGNIFICANT CHANGE UP (ref 3.5–5.3)
POTASSIUM SERPL-SCNC: 4.3 MMOL/L — SIGNIFICANT CHANGE UP (ref 3.5–5.3)
PROTHROM AB SERPL-ACNC: 13.2 SEC — HIGH (ref 10–12.9)
RBC # BLD: 3.27 M/UL — LOW (ref 3.8–5.2)
RBC # FLD: 14.6 % — HIGH (ref 10.3–14.5)
SODIUM SERPL-SCNC: 138 MMOL/L — SIGNIFICANT CHANGE UP (ref 135–145)
WBC # BLD: 7.19 K/UL — SIGNIFICANT CHANGE UP (ref 3.8–10.5)
WBC # FLD AUTO: 7.19 K/UL — SIGNIFICANT CHANGE UP (ref 3.8–10.5)

## 2020-02-29 RX ORDER — ATENOLOL 25 MG/1
50 TABLET ORAL AT BEDTIME
Refills: 0 | Status: DISCONTINUED | OUTPATIENT
Start: 2020-02-29 | End: 2020-03-01

## 2020-02-29 RX ORDER — ATENOLOL 25 MG/1
100 TABLET ORAL EVERY 12 HOURS
Refills: 0 | Status: DISCONTINUED | OUTPATIENT
Start: 2020-02-29 | End: 2020-02-29

## 2020-02-29 RX ORDER — FENTANYL CITRATE 50 UG/ML
1 INJECTION INTRAVENOUS
Refills: 0 | Status: DISCONTINUED | OUTPATIENT
Start: 2020-02-29 | End: 2020-03-01

## 2020-02-29 RX ADMIN — OXYCODONE HYDROCHLORIDE 10 MILLIGRAM(S): 5 TABLET ORAL at 01:32

## 2020-02-29 RX ADMIN — Medication 150 MICROGRAM(S): at 05:28

## 2020-02-29 RX ADMIN — LAMOTRIGINE 150 MILLIGRAM(S): 25 TABLET, ORALLY DISINTEGRATING ORAL at 01:03

## 2020-02-29 RX ADMIN — AMIODARONE HYDROCHLORIDE 400 MILLIGRAM(S): 400 TABLET ORAL at 05:30

## 2020-02-29 RX ADMIN — ATORVASTATIN CALCIUM 40 MILLIGRAM(S): 80 TABLET, FILM COATED ORAL at 21:56

## 2020-02-29 RX ADMIN — FENTANYL CITRATE 1 PATCH: 50 INJECTION INTRAVENOUS at 11:44

## 2020-02-29 RX ADMIN — ENOXAPARIN SODIUM 40 MILLIGRAM(S): 100 INJECTION SUBCUTANEOUS at 05:29

## 2020-02-29 RX ADMIN — OXYCODONE HYDROCHLORIDE 10 MILLIGRAM(S): 5 TABLET ORAL at 07:52

## 2020-02-29 RX ADMIN — Medication 20 MILLIGRAM(S): at 05:28

## 2020-02-29 RX ADMIN — AMIODARONE HYDROCHLORIDE 400 MILLIGRAM(S): 400 TABLET ORAL at 14:39

## 2020-02-29 RX ADMIN — Medication 0.5 MILLIGRAM(S): at 09:59

## 2020-02-29 RX ADMIN — OXYCODONE HYDROCHLORIDE 10 MILLIGRAM(S): 5 TABLET ORAL at 01:02

## 2020-02-29 RX ADMIN — ATENOLOL 100 MILLIGRAM(S): 25 TABLET ORAL at 09:58

## 2020-02-29 RX ADMIN — Medication 0.5 MILLIGRAM(S): at 21:57

## 2020-02-29 RX ADMIN — DULOXETINE HYDROCHLORIDE 60 MILLIGRAM(S): 30 CAPSULE, DELAYED RELEASE ORAL at 17:26

## 2020-02-29 RX ADMIN — Medication 325 MILLIGRAM(S): at 11:45

## 2020-02-29 RX ADMIN — ATENOLOL 50 MILLIGRAM(S): 25 TABLET ORAL at 21:56

## 2020-02-29 RX ADMIN — OXYCODONE HYDROCHLORIDE 10 MILLIGRAM(S): 5 TABLET ORAL at 07:22

## 2020-02-29 RX ADMIN — SODIUM CHLORIDE 3 MILLILITER(S): 9 INJECTION INTRAMUSCULAR; INTRAVENOUS; SUBCUTANEOUS at 05:32

## 2020-02-29 RX ADMIN — SODIUM CHLORIDE 3 MILLILITER(S): 9 INJECTION INTRAMUSCULAR; INTRAVENOUS; SUBCUTANEOUS at 21:57

## 2020-02-29 RX ADMIN — FENTANYL CITRATE 1 PATCH: 50 INJECTION INTRAVENOUS at 21:56

## 2020-02-29 RX ADMIN — SODIUM CHLORIDE 3 MILLILITER(S): 9 INJECTION INTRAMUSCULAR; INTRAVENOUS; SUBCUTANEOUS at 14:41

## 2020-02-29 RX ADMIN — AMIODARONE HYDROCHLORIDE 400 MILLIGRAM(S): 400 TABLET ORAL at 21:56

## 2020-02-29 RX ADMIN — CHLORHEXIDINE GLUCONATE 1 APPLICATION(S): 213 SOLUTION TOPICAL at 07:23

## 2020-02-29 RX ADMIN — LAMOTRIGINE 150 MILLIGRAM(S): 25 TABLET, ORALLY DISINTEGRATING ORAL at 14:39

## 2020-02-29 RX ADMIN — DULOXETINE HYDROCHLORIDE 60 MILLIGRAM(S): 30 CAPSULE, DELAYED RELEASE ORAL at 05:29

## 2020-02-29 RX ADMIN — ENOXAPARIN SODIUM 40 MILLIGRAM(S): 100 INJECTION SUBCUTANEOUS at 17:27

## 2020-02-29 RX ADMIN — SPIRONOLACTONE 25 MILLIGRAM(S): 25 TABLET, FILM COATED ORAL at 05:28

## 2020-02-29 RX ADMIN — Medication 50 MILLIGRAM(S): at 05:28

## 2020-02-29 RX ADMIN — PANTOPRAZOLE SODIUM 40 MILLIGRAM(S): 20 TABLET, DELAYED RELEASE ORAL at 05:28

## 2020-02-29 NOTE — PROGRESS NOTE ADULT - ASSESSMENT
69F-PMH HTN, HLD, bipolar disorder, fibromyalgia, hypothyroid, chronic pain due to OA bilateral knees and hips, SLE, RA, known moderate AS, incidental finding dilation of ascending aorta 5.1cm on CT scan and aortic/thoracic aneurysm, had pre-op cardiac cath 11/5/19 (normal coronaries), pt. feel on left hip and suffered fracture, and surgery postponed. Pt. presents for scheduled Aortic Valve Replacement, Ascending Aortic Replacement.   2/19: AVR (tissue) and ascending aorta replacement with Dr. Alvares. Received intra-op products: 1 Cryo, 1 platelet, 1000 FEIBA. Recovered in the CTU.   2/20: extubated. thrombocytopenia noted. Pain management. Methadone started per Dr. Alvares.   2/21: Methadone dose decreased. PCA discontinued. CT remain in with minimal drainage.   2/22: ROBER-Lopressor and Amio load initiated. CT removed. Monsivais removed-voiding. Transferred to SDU. Remains in rate controlled AF in the 80s. PW on a VVI backup. Methadone dc'd, pain managed with oral pain meds. Discharge planning-home when medically stable.  2/23 Pt still in Afib rate controlled x 24 hrs converted to SR 1st deg. @ 10m - given mg 2gm iv & BB increased to tid -on amio load- d/c mars  no heparin gtt or coumadin @ this time per dr. alvares - start lvx 30 bid  d/c planning  2/24 NSR, episodes of pacing yesterday.  Pacer decreased to 30. Amio load completes by tomorrow. Pain management  2/25 SB, decrease  lopressor 50q12, amio 200. Cont diuresis.  2/26 VVS; Continue with current medication regimen. PW D/C this AM by Dr. Alvares.    2/28 pt converted to afib 100-120- lovenox 40 sq bid; increase amio 400 tid and lopressor 50 mg po tid  continue to monitor; K supplemented and Mag 2 grams iv given  continue to observe 69F-PMH HTN, HLD, bipolar disorder, fibromyalgia, hypothyroid, chronic pain due to OA bilateral knees and hips, SLE, RA, known moderate AS, incidental finding dilation of ascending aorta 5.1cm on CT scan and aortic/thoracic aneurysm, had pre-op cardiac cath 11/5/19 (normal coronaries), pt. feel on left hip and suffered fracture, and surgery postponed. Pt. presents for scheduled Aortic Valve Replacement, Ascending Aortic Replacement.   2/19: AVR (tissue) and ascending aorta replacement with Dr. Alvares. Received intra-op products: 1 Cryo, 1 platelet, 1000 FEIBA. Recovered in the CTU.   2/20: extubated. thrombocytopenia noted. Pain management. Methadone started per Dr. Alvares.   2/21: Methadone dose decreased. PCA discontinued. CT remain in with minimal drainage.   2/22: ROBER-Lopressor and Amio load initiated. CT removed. Monsivais removed-voiding. Transferred to SDU. Remains in rate controlled AF in the 80s. PW on a VVI backup. Methadone dc'd, pain managed with oral pain meds. Discharge planning-home when medically stable.  2/23 Pt still in Afib rate controlled x 24 hrs converted to SR 1st deg. @ 10m - given mg 2gm iv & BB increased to tid -on amio load- d/c mars  no heparin gtt or coumadin @ this time per dr. alvares - start lvx 30 bid  d/c planning  2/24 NSR, episodes of pacing yesterday.  Pacer decreased to 30. Amio load completes by tomorrow. Pain management  2/25 SB, decrease  lopressor 50q12, amio 200. Cont diuresis.  2/26 VVS; Continue with current medication regimen. PW D/C this AM by Dr. Alvares.    2/28 pt converted to afib 100-120- lovenox 40 sq bid; increase amio 400 tid and lopressor 50 mg po tid  continue to monitor; K supplemented and Mag 2 grams iv given  continue to observe   2/29 VSS; change lopressor to atenolol 100 bid as per Dr. Alvares; lovenox 40 bid for anticoagulation at this time as per Dr. Alvares;  d/c diuretics/ fentanyl patch for pain for OA  Discharge planning- home when RSR

## 2020-02-29 NOTE — PROGRESS NOTE ADULT - PROBLEM SELECTOR PROBLEM 1
S/P AVR (aortic valve replacement)

## 2020-02-29 NOTE — PROGRESS NOTE ADULT - PROBLEM SELECTOR PLAN 1
s/p AVR and ascending aorta replacement   increase lopressor 50 mg po tid   Continue with  mg PO Daily   increase amio 400 tid   diuresis with lasix 20 qd and ald 25 qd   Pain management--chronic pain pt, on oxycodone and Tylenol with positive results, no longer on methadone   Chest PT and incentive spirometry  Increase ambulation and activity   Shower qd  DVT and GI prophylaxis   Discharge planning-home when stable continue postop care  change lopressor to atenolol 100 bid as per Dr. Alvares today   Continue with  mg PO Daily   continue  amio 400 tid   d/c diuretics   Pain management--chronic pain pt, on oxycodone and Tylenol with positive results, no longer on methadone   Chest PT and incentive spirometry  Increase ambulation and activity   Shower qd  DVT and GI prophylaxis   Discharge planning-home when stable

## 2020-02-29 NOTE — PROGRESS NOTE ADULT - PROBLEM SELECTOR PLAN 2
pt converted to afib 100-120- lovenox 40 sq bid; increase amio 400 tid and lopressor 50 mg po tid  continue to monitor; K supplemented and Mag 2 grams iv given  continue to observe change bb to atenolol 100 bid   continue amio load  lovenox 40 sq bid for anticoagulation   monitor and supplement electrolytes

## 2020-03-01 ENCOUNTER — TRANSCRIPTION ENCOUNTER (OUTPATIENT)
Age: 70
End: 2020-03-01

## 2020-03-01 VITALS
HEART RATE: 60 BPM | TEMPERATURE: 98 F | SYSTOLIC BLOOD PRESSURE: 128 MMHG | DIASTOLIC BLOOD PRESSURE: 82 MMHG | OXYGEN SATURATION: 96 % | RESPIRATION RATE: 18 BRPM

## 2020-03-01 LAB
ANION GAP SERPL CALC-SCNC: 15 MMOL/L — SIGNIFICANT CHANGE UP (ref 5–17)
BUN SERPL-MCNC: 18 MG/DL — SIGNIFICANT CHANGE UP (ref 7–23)
CALCIUM SERPL-MCNC: 9.5 MG/DL — SIGNIFICANT CHANGE UP (ref 8.4–10.5)
CHLORIDE SERPL-SCNC: 96 MMOL/L — SIGNIFICANT CHANGE UP (ref 96–108)
CO2 SERPL-SCNC: 25 MMOL/L — SIGNIFICANT CHANGE UP (ref 22–31)
CREAT SERPL-MCNC: 0.9 MG/DL — SIGNIFICANT CHANGE UP (ref 0.5–1.3)
GLUCOSE SERPL-MCNC: 99 MG/DL — SIGNIFICANT CHANGE UP (ref 70–99)
HCT VFR BLD CALC: 34 % — LOW (ref 34.5–45)
HGB BLD-MCNC: 10.3 G/DL — LOW (ref 11.5–15.5)
MCHC RBC-ENTMCNC: 30.3 GM/DL — LOW (ref 32–36)
MCHC RBC-ENTMCNC: 31.2 PG — SIGNIFICANT CHANGE UP (ref 27–34)
MCV RBC AUTO: 103 FL — HIGH (ref 80–100)
NRBC # BLD: 0 /100 WBCS — SIGNIFICANT CHANGE UP (ref 0–0)
PLATELET # BLD AUTO: 410 K/UL — HIGH (ref 150–400)
POTASSIUM SERPL-MCNC: 4.5 MMOL/L — SIGNIFICANT CHANGE UP (ref 3.5–5.3)
POTASSIUM SERPL-SCNC: 4.5 MMOL/L — SIGNIFICANT CHANGE UP (ref 3.5–5.3)
RBC # BLD: 3.3 M/UL — LOW (ref 3.8–5.2)
RBC # FLD: 15 % — HIGH (ref 10.3–14.5)
SODIUM SERPL-SCNC: 136 MMOL/L — SIGNIFICANT CHANGE UP (ref 135–145)
WBC # BLD: 10.58 K/UL — HIGH (ref 3.8–10.5)
WBC # FLD AUTO: 10.58 K/UL — HIGH (ref 3.8–10.5)

## 2020-03-01 PROCEDURE — C1889: CPT

## 2020-03-01 PROCEDURE — P9045: CPT

## 2020-03-01 PROCEDURE — 85396 CLOTTING ASSAY WHOLE BLOOD: CPT

## 2020-03-01 PROCEDURE — 84100 ASSAY OF PHOSPHORUS: CPT

## 2020-03-01 PROCEDURE — 97161 PT EVAL LOW COMPLEX 20 MIN: CPT

## 2020-03-01 PROCEDURE — 84484 ASSAY OF TROPONIN QUANT: CPT

## 2020-03-01 PROCEDURE — 82565 ASSAY OF CREATININE: CPT

## 2020-03-01 PROCEDURE — 82330 ASSAY OF CALCIUM: CPT

## 2020-03-01 PROCEDURE — 85027 COMPLETE CBC AUTOMATED: CPT

## 2020-03-01 PROCEDURE — 82553 CREATINE MB FRACTION: CPT

## 2020-03-01 PROCEDURE — 84295 ASSAY OF SERUM SODIUM: CPT

## 2020-03-01 PROCEDURE — 83735 ASSAY OF MAGNESIUM: CPT

## 2020-03-01 PROCEDURE — 85384 FIBRINOGEN ACTIVITY: CPT

## 2020-03-01 PROCEDURE — P9041: CPT

## 2020-03-01 PROCEDURE — 80048 BASIC METABOLIC PNL TOTAL CA: CPT

## 2020-03-01 PROCEDURE — P9016: CPT

## 2020-03-01 PROCEDURE — 88305 TISSUE EXAM BY PATHOLOGIST: CPT

## 2020-03-01 PROCEDURE — 82550 ASSAY OF CK (CPK): CPT

## 2020-03-01 PROCEDURE — C1768: CPT

## 2020-03-01 PROCEDURE — 84443 ASSAY THYROID STIM HORMONE: CPT

## 2020-03-01 PROCEDURE — 86923 COMPATIBILITY TEST ELECTRIC: CPT

## 2020-03-01 PROCEDURE — 93005 ELECTROCARDIOGRAM TRACING: CPT

## 2020-03-01 PROCEDURE — 83605 ASSAY OF LACTIC ACID: CPT

## 2020-03-01 PROCEDURE — 82962 GLUCOSE BLOOD TEST: CPT

## 2020-03-01 PROCEDURE — 85610 PROTHROMBIN TIME: CPT

## 2020-03-01 PROCEDURE — 85730 THROMBOPLASTIN TIME PARTIAL: CPT

## 2020-03-01 PROCEDURE — 82947 ASSAY GLUCOSE BLOOD QUANT: CPT

## 2020-03-01 PROCEDURE — P9047: CPT

## 2020-03-01 PROCEDURE — 97530 THERAPEUTIC ACTIVITIES: CPT

## 2020-03-01 PROCEDURE — 82803 BLOOD GASES ANY COMBINATION: CPT

## 2020-03-01 PROCEDURE — C1769: CPT

## 2020-03-01 PROCEDURE — 85014 HEMATOCRIT: CPT

## 2020-03-01 PROCEDURE — 86891 AUTOLOGOUS BLOOD OP SALVAGE: CPT

## 2020-03-01 PROCEDURE — 88304 TISSUE EXAM BY PATHOLOGIST: CPT

## 2020-03-01 PROCEDURE — 71045 X-RAY EXAM CHEST 1 VIEW: CPT

## 2020-03-01 PROCEDURE — P9037: CPT

## 2020-03-01 PROCEDURE — 82435 ASSAY OF BLOOD CHLORIDE: CPT

## 2020-03-01 PROCEDURE — 80053 COMPREHEN METABOLIC PANEL: CPT

## 2020-03-01 PROCEDURE — C1751: CPT

## 2020-03-01 PROCEDURE — 94002 VENT MGMT INPAT INIT DAY: CPT

## 2020-03-01 PROCEDURE — 36430 TRANSFUSION BLD/BLD COMPNT: CPT

## 2020-03-01 PROCEDURE — 84132 ASSAY OF SERUM POTASSIUM: CPT

## 2020-03-01 PROCEDURE — 97116 GAIT TRAINING THERAPY: CPT

## 2020-03-01 PROCEDURE — 93010 ELECTROCARDIOGRAM REPORT: CPT

## 2020-03-01 PROCEDURE — P9012: CPT

## 2020-03-01 RX ORDER — ATENOLOL 25 MG/1
1 TABLET ORAL
Qty: 30 | Refills: 0
Start: 2020-03-01 | End: 2020-03-30

## 2020-03-01 RX ORDER — METOPROLOL TARTRATE 50 MG
1 TABLET ORAL
Qty: 0 | Refills: 0 | DISCHARGE

## 2020-03-01 RX ORDER — ACETAMINOPHEN 500 MG
2 TABLET ORAL
Qty: 0 | Refills: 0 | DISCHARGE
Start: 2020-03-01

## 2020-03-01 RX ORDER — TRAZODONE HCL 50 MG
1 TABLET ORAL
Qty: 0 | Refills: 0 | DISCHARGE

## 2020-03-01 RX ORDER — LOSARTAN POTASSIUM 100 MG/1
1 TABLET, FILM COATED ORAL
Qty: 0 | Refills: 0 | DISCHARGE

## 2020-03-01 RX ORDER — FUROSEMIDE 40 MG
1 TABLET ORAL
Qty: 0 | Refills: 0 | DISCHARGE

## 2020-03-01 RX ORDER — PROCHLORPERAZINE MALEATE 5 MG
1 TABLET ORAL
Qty: 0 | Refills: 0 | DISCHARGE

## 2020-03-01 RX ORDER — POLYETHYLENE GLYCOL 3350 17 G/17G
17 POWDER, FOR SOLUTION ORAL
Qty: 0 | Refills: 0 | DISCHARGE
Start: 2020-03-01

## 2020-03-01 RX ORDER — HYDRALAZINE HCL 50 MG
1 TABLET ORAL
Qty: 0 | Refills: 0 | DISCHARGE

## 2020-03-01 RX ORDER — AMIODARONE HYDROCHLORIDE 400 MG/1
1 TABLET ORAL
Qty: 30 | Refills: 0
Start: 2020-03-01 | End: 2020-03-30

## 2020-03-01 RX ORDER — ATORVASTATIN CALCIUM 80 MG/1
1 TABLET, FILM COATED ORAL
Qty: 30 | Refills: 0
Start: 2020-03-01 | End: 2020-03-30

## 2020-03-01 RX ORDER — POTASSIUM CHLORIDE 20 MEQ
1 PACKET (EA) ORAL
Qty: 0 | Refills: 0 | DISCHARGE

## 2020-03-01 RX ORDER — ASPIRIN/CALCIUM CARB/MAGNESIUM 324 MG
1 TABLET ORAL
Qty: 30 | Refills: 0
Start: 2020-03-01 | End: 2020-03-30

## 2020-03-01 RX ORDER — VERAPAMIL HCL 240 MG
1 CAPSULE, EXTENDED RELEASE PELLETS 24 HR ORAL
Qty: 0 | Refills: 0 | DISCHARGE

## 2020-03-01 RX ORDER — OXYCODONE HYDROCHLORIDE 5 MG/1
1 TABLET ORAL
Qty: 42 | Refills: 0
Start: 2020-03-01 | End: 2020-03-07

## 2020-03-01 RX ORDER — ATORVASTATIN CALCIUM 80 MG/1
1 TABLET, FILM COATED ORAL
Qty: 0 | Refills: 0 | DISCHARGE

## 2020-03-01 RX ADMIN — FENTANYL CITRATE 1 PATCH: 50 INJECTION INTRAVENOUS at 07:33

## 2020-03-01 RX ADMIN — LAMOTRIGINE 150 MILLIGRAM(S): 25 TABLET, ORALLY DISINTEGRATING ORAL at 01:32

## 2020-03-01 RX ADMIN — Medication 325 MILLIGRAM(S): at 12:11

## 2020-03-01 RX ADMIN — PANTOPRAZOLE SODIUM 40 MILLIGRAM(S): 20 TABLET, DELAYED RELEASE ORAL at 05:47

## 2020-03-01 RX ADMIN — AMIODARONE HYDROCHLORIDE 400 MILLIGRAM(S): 400 TABLET ORAL at 05:46

## 2020-03-01 RX ADMIN — DULOXETINE HYDROCHLORIDE 60 MILLIGRAM(S): 30 CAPSULE, DELAYED RELEASE ORAL at 05:46

## 2020-03-01 RX ADMIN — Medication 0.5 MILLIGRAM(S): at 05:47

## 2020-03-01 RX ADMIN — LAMOTRIGINE 150 MILLIGRAM(S): 25 TABLET, ORALLY DISINTEGRATING ORAL at 13:27

## 2020-03-01 RX ADMIN — SODIUM CHLORIDE 3 MILLILITER(S): 9 INJECTION INTRAMUSCULAR; INTRAVENOUS; SUBCUTANEOUS at 05:47

## 2020-03-01 RX ADMIN — ENOXAPARIN SODIUM 40 MILLIGRAM(S): 100 INJECTION SUBCUTANEOUS at 05:47

## 2020-03-01 RX ADMIN — Medication 150 MICROGRAM(S): at 05:47

## 2020-03-01 RX ADMIN — CHLORHEXIDINE GLUCONATE 1 APPLICATION(S): 213 SOLUTION TOPICAL at 10:33

## 2020-03-01 NOTE — DISCHARGE NOTE PROVIDER - NSDCFUADDINST_GEN_ALL_CORE_FT
call Dr. Alvares for fever > 101  no driving until cleared by Dr. Alvares  refer to cardiac surgery do and don't checklist  antibiotic prophylaxis prior to any invasive procedure

## 2020-03-01 NOTE — DISCHARGE NOTE PROVIDER - CARE PROVIDER_API CALL
Werner Alvares (MD)  Surgery; Surgical Critical Care; Thoracic and Cardiac Surgery  [address] [place] [address]  Phone: [phone]  Fax: [phone]  Follow Up Time:

## 2020-03-01 NOTE — DISCHARGE NOTE PROVIDER - NSDCCPCAREPLAN_GEN_ALL_CORE_FT
PRINCIPAL DISCHARGE DIAGNOSIS  Diagnosis: S/P AVR (aortic valve replacement)  Assessment and Plan of Treatment: shower daily  weigh yourself daily  continue current prescriptions as ordered  increase activity as tolerated   no added salt; low fat; low cholesterol, low salt diet.   follow up with Cardiologist in 1-2 weeks. Call to schedule appointment.  follow up with cardiac surgeon PRINCIPAL DISCHARGE DIAGNOSIS  Diagnosis: S/P AVR (aortic valve replacement)  Assessment and Plan of Treatment: shower daily  weigh yourself daily  continue current prescriptions as ordered  increase activity as tolerated   no added salt;  low salt regular diet   antibiotic prophylaxis prior to any invasive procedure   follow up with Cardiologist in 1-2 weeks. Call to schedule appointment.  follow up with cardiac surgeon, Dr. Alvares in 7 days after discharge from rehab. CAll to schedule appointment. [phone]

## 2020-03-01 NOTE — DISCHARGE NOTE PROVIDER - NSDCACTIVITY_GEN_ALL_CORE
Walking - Outdoors allowed/Do not drive or operate machinery/Do not make important decisions/No heavy lifting/straining/Stairs allowed/Walking - Indoors allowed/Sex allowed/Showering allowed

## 2020-03-01 NOTE — DISCHARGE NOTE PROVIDER - NSDCPNSUBOBJ_GEN_ALL_CORE
VSS     tele: rsr/ sb 50's    midsternal incision cdi jai    lungs clear, RR easy unlabored    + bs nt nd + bm ; neg n/v/d    LE: neg calf tenderness, neg LE edema; PPP biltaterally        Discharge pt home today 3/1 as per CTS rounds

## 2020-03-01 NOTE — DISCHARGE NOTE PROVIDER - NSDCMRMEDTOKEN_GEN_ALL_CORE_FT
Aspirin Enteric Coated 81 mg oral delayed release tablet: 1 tab(s) orally once a day, hold one week pre-op as per Dr. Alvares, last dose 2/13/20  atorvastatin 40 mg oral tablet: 1 tab(s) orally once a day  Compazine 10 mg oral tablet: 1 tab(s) orally every 6 hours, As Needed  cyclobenzaprine 10 mg oral tablet: 1 tab(s) orally 3 times a day  DULoxetine 60 mg oral delayed release capsule: 1 cap(s) orally 2 times a day  Endocet 10/325 oral tablet: 1 tab(s) orally 5 times a day  fentanyl topical 25 mcg/hr transdermal film, extended release (obsolete): 1 patch transdermal every 3 days  furosemide 20 mg oral tablet: 1 tab(s) orally 2 times a day  hydrALAZINE 100 mg oral tablet: 1 tab(s) orally 3 times a day  lamoTRIgine 300 mg oral tablet, extended release: 1 tab(s) orally once a day  levothyroxine 150 mcg (0.15 mg) oral tablet: 1 tab(s) orally once a day  LORazepam 2 mg oral tablet: 1 tab(s) orally 3 times a day  losartan 100 mg oral tablet: 1 tab(s) orally once a day  metoprolol tartrate 100 mg oral tablet: 1 tab(s) orally 2 times a day  omeprazole 40 mg oral delayed release capsule: 1 cap(s) orally once a day (in the evening)  potassium chloride 20 mEq oral tablet, extended release: 1 tab(s) orally once a day  traZODone 100 mg oral tablet: 1 tab(s) orally once a day (at bedtime)  verapamil 240 mg/24 hours oral tablet, extended release: 1 tab(s) orally once a day acetaminophen 325 mg oral tablet: 2 tab(s) orally every 6 hours, As needed, Mild Pain (1 - 3)  amiodarone 200 mg oral tablet: 1 tab(s) orally once a day   aspirin 325 mg oral delayed release tablet: 1 tab(s) orally once a day  atenolol 50 mg oral tablet: 1 tab(s) orally once a day (at bedtime)  atorvastatin 40 mg oral tablet: 1 tab(s) orally once a day (at bedtime)  cyclobenzaprine 10 mg oral tablet: 1 tab(s) orally 3 times a day  DULoxetine 60 mg oral delayed release capsule: 1 cap(s) orally 2 times a day  fentanyl topical 25 mcg/hr transdermal film, extended release (obsolete): 1 patch transdermal every 3 days  lamoTRIgine 300 mg oral tablet, extended release: 1 tab(s) orally once a day  levothyroxine 150 mcg (0.15 mg) oral tablet: 1 tab(s) orally once a day  LORazepam 2 mg oral tablet: 1 tab(s) orally 3 times a day  omeprazole 40 mg oral delayed release capsule: 1 cap(s) orally once a day (in the evening)  polyethylene glycol 3350 oral powder for reconstitution: 17 gram(s) orally once a day acetaminophen 325 mg oral tablet: 2 tab(s) orally every 6 hours, As needed, Mild Pain (1 - 3)  amiodarone 200 mg oral tablet: 1 tab(s) orally once a day   aspirin 325 mg oral delayed release tablet: 1 tab(s) orally once a day  atenolol 50 mg oral tablet: 1 tab(s) orally once a day (at bedtime)  atorvastatin 40 mg oral tablet: 1 tab(s) orally once a day (at bedtime)  cyclobenzaprine 10 mg oral tablet: 1 tab(s) orally 3 times a day  DULoxetine 60 mg oral delayed release capsule: 1 cap(s) orally 2 times a day  fentanyl topical 25 mcg/hr transdermal film, extended release (obsolete): 1 patch transdermal every 3 days  lamoTRIgine 300 mg oral tablet, extended release: 1 tab(s) orally once a day  levothyroxine 150 mcg (0.15 mg) oral tablet: 1 tab(s) orally once a day  LORazepam 2 mg oral tablet: 1 tab(s) orally 3 times a day  omeprazole 40 mg oral delayed release capsule: 1 cap(s) orally once a day (in the evening)  oxyCODONE 10 mg oral tablet: 1 tab(s) orally every 4 hours as needed for pain MDD:6  polyethylene glycol 3350 oral powder for reconstitution: 17 gram(s) orally once a day

## 2020-03-01 NOTE — DISCHARGE NOTE PROVIDER - HOSPITAL COURSE
69F-PMH HTN, HLD, bipolar disorder, fibromyalgia, hypothyroid, chronic pain due to OA bilateral knees and hips, SLE, RA, known moderate AS, incidental finding dilation of ascending aorta 5.1cm on CT scan and aortic/thoracic aneurysm, had pre-op cardiac cath 11/5/19 (normal coronaries), pt. feel on left hip and suffered fracture, and surgery postponed. Pt. presents for scheduled Aortic Valve Replacement, Ascending Aortic Replacement.     2/19: AVR (tissue) and ascending aorta replacement with Dr. Alvares. Received intra-op products: 1 Cryo, 1 platelet, 1000 FEIBA. Recovered in the CTU.     2/20: extubated. thrombocytopenia noted. Pain management. Methadone started per Dr. Alvares.     2/21: Methadone dose decreased. PCA discontinued. CT remain in with minimal drainage.     2/22: ROBER-Lopressor and Amio load initiated. CT removed. Monsivais removed-voiding. Transferred to SDU. Remains in rate controlled AF in the 80s. PW on a VVI backup. Methadone dc'd, pain managed with oral pain meds. Discharge planning-home when medically stable.    2/23 Pt still in Afib rate controlled x 24 hrs converted to SR 1st deg. @ 10m - given mg 2gm iv & BB increased to tid -on amio load- d/c mars    no heparin gtt or coumadin @ this time per dr. alvares - start lvx 30 bid  d/c planning    2/24 NSR, episodes of pacing yesterday.  Pacer decreased to 30. Amio load completes by tomorrow. Pain management    2/25 SB, decrease  lopressor 50q12, amio 200. Cont diuresis.    2/26 VVS; Continue with current medication regimen. PW D/C this AM by Dr. Alvares.      2/28 pt converted to afib 100-120- lovenox 40 sq bid; increase amio 400 tid and lopressor 50 mg po tid    continue to monitor; K supplemented and Mag 2 grams iv given    continue to observe     2/29 VSS; change lopressor to atenolol 100 bid as per Dr. Alvares; lovenox 40 bid for anticoagulation at this time as per Dr. Alvares;  d/c diuretics/ fentanyl patch for pain for OA    Discharge planning- home when RSR     3/1 rsr since 1000 2/29 - VSS; discharge pt home today as per CTS rounds

## 2020-03-04 NOTE — REVIEW OF SYSTEMS
[SOB on Exertion] : no shortness of breath during exertion [Shortness Of Breath] : no shortness of breath [Dizziness] : no dizziness [Fainting] : no fainting

## 2020-03-04 NOTE — CONSULT LETTER
[FreeTextEntry2] : Dr. Chetan Montana\par [address]\par [place] [id number] [FreeTextEntry3] : Werner Alvares MD\par  & \par \par Cardiovascular & Thoracic Surgery\par [facility] \par [address]\par [facility] [id number]\par

## 2020-03-04 NOTE — ASSESSMENT
[FreeTextEntry1] : Gia is a 69 year old female former smoker with PMH of spinal stenosis, osteoarthritis in right knee and bilateral hips, HTN, HLD, hypothyroidism, RA, SLE, moderate AS and dilatation of ascending aorta (5.1 cm) on recent imaging. Recent TTE on 9/24/2019 demonstrated normal global and regional LV systolic function, EF 65%, aortic valve is calcified, moderate AS, mGr 32.5 mmHg, pGr 53.9 mmHg, AoV 3.7 m/s, mild MR, mild TR and mild pulmonary HTN. She was referred for surgical consultation. She denies angina, palpitations, back pain, dizziness or syncope. She reports GONZALEZ and describes "I feel like I cant get enough air in my lung." She also reports pedal edema and she titrates her Lasix dose accordingly. \par \par Surgery (AVR/ascending aortic replacement) booked for 12/5/2019 but postponed due to recent hip fracture. She returns today for followup and further discussion of surgical plan. \par \par Plan:\par 1) Follow up with Dr. Montoya (Orthopedic Surgery) for plan regarding pending hip surgery\par 2) Return on February 12th

## 2020-03-12 ENCOUNTER — APPOINTMENT (OUTPATIENT)
Dept: CARDIOTHORACIC SURGERY | Facility: CLINIC | Age: 70
End: 2020-03-12

## 2020-03-16 ENCOUNTER — RESULT REVIEW (OUTPATIENT)
Age: 70
End: 2020-03-16

## 2020-04-27 DIAGNOSIS — L91.0 HYPERTROPHIC SCAR: ICD-10-CM

## 2020-04-27 RX ORDER — TRIAMCINOLONE ACETONIDE 1 MG/G
0.1 CREAM TOPICAL TWICE DAILY
Qty: 60 | Refills: 0 | Status: ACTIVE | COMMUNITY
Start: 2020-04-27 | End: 1900-01-01

## 2020-07-19 ENCOUNTER — INPATIENT (INPATIENT)
Facility: HOSPITAL | Age: 70
LOS: 7 days | Discharge: HOME | DRG: 69 | End: 2020-07-27
Attending: STUDENT IN AN ORGANIZED HEALTH CARE EDUCATION/TRAINING PROGRAM | Admitting: HOSPITALIST
Payer: MEDICARE

## 2020-07-19 VITALS
HEART RATE: 74 BPM | OXYGEN SATURATION: 98 % | WEIGHT: 184.97 LBS | TEMPERATURE: 98 F | DIASTOLIC BLOOD PRESSURE: 74 MMHG | SYSTOLIC BLOOD PRESSURE: 140 MMHG | RESPIRATION RATE: 18 BRPM

## 2020-07-19 DIAGNOSIS — Z98.51 TUBAL LIGATION STATUS: Chronic | ICD-10-CM

## 2020-07-19 DIAGNOSIS — Z90.710 ACQUIRED ABSENCE OF BOTH CERVIX AND UTERUS: Chronic | ICD-10-CM

## 2020-07-19 DIAGNOSIS — Z98.890 OTHER SPECIFIED POSTPROCEDURAL STATES: Chronic | ICD-10-CM

## 2020-07-19 DIAGNOSIS — Z98.49 CATARACT EXTRACTION STATUS, UNSPECIFIED EYE: Chronic | ICD-10-CM

## 2020-07-19 DIAGNOSIS — Z98.891 HISTORY OF UTERINE SCAR FROM PREVIOUS SURGERY: Chronic | ICD-10-CM

## 2020-07-19 PROCEDURE — 93010 ELECTROCARDIOGRAM REPORT: CPT

## 2020-07-19 PROCEDURE — 99285 EMERGENCY DEPT VISIT HI MDM: CPT

## 2020-07-19 PROCEDURE — 71045 X-RAY EXAM CHEST 1 VIEW: CPT | Mod: 26

## 2020-07-19 NOTE — ED PROVIDER NOTE - CLINICAL SUMMARY MEDICAL DECISION MAKING FREE TEXT BOX
Attending MD Saldana: 69F with ho RA, ?seizure disorder, CVA with left sided weakness, on anticoagulation (patient unsure what anticoagulant) presenting with difficulty concentrating, lightheadedness and gait instability since 9pm tonight. No clear complaint of vertigo by history. Ddx includes metabolic derangement, less likely posterior circulation CVA but must consider given CVA history and report of gait instability. Plan for labs, CT head, neurology evaluation.

## 2020-07-19 NOTE — ED PROVIDER NOTE - OBJECTIVE STATEMENT
69F-PMH HTN, HLD, bipolar disorder, fibromyalgia, hypothyroid, SLE, RA, known moderate AS pw cc of "feeling off balance"    Pt states was feeling tired for few days and today while walking "felt off balance" and had to sit down, called EMS at this point. States had "small stroke" earlier this year. No CP, No sob, no cough or burning on urination.   On ASA took today

## 2020-07-19 NOTE — ED PROVIDER NOTE - NS ED ROS FT
CONSTITUTIONAL: No fevers, no chills  Eyes: No vision changes  Cardiovascular: No Chest pain  Respiratory: No SOB  Gastrointestinal: No n/v/d, no abd pain  Genitourinary: no dysuria, no hematuria  SKIN: no rashes.  NEURO: refer to HPI  PSYCHIATRIC: no known mental health issues.  Endocrine: No unexplained weight gain

## 2020-07-19 NOTE — ED PROVIDER NOTE - PHYSICAL EXAMINATION
General:  NAD  HEENT: pupils equal and reactive, normal external ears bilaterally   Cardiac: RRR, no MRG appreciated  Resp: lungs clear to auscultation bilaterally, symmetric chest wall rise  Abd: soft, nontender, nondistended,   : no CVA tenderness  Neuro: Moving all extremities, cn 2-12 intacT, finger to nose negative   Skin:  normal color for race

## 2020-07-19 NOTE — ED PROVIDER NOTE - ATTENDING CONTRIBUTION TO CARE
Attending MD Saldana:  I personally have seen and examined this patient.  Resident note reviewed and agree on plan of care and except where noted.  See HPI, PE, and MDM for details.

## 2020-07-20 DIAGNOSIS — G93.40 ENCEPHALOPATHY, UNSPECIFIED: ICD-10-CM

## 2020-07-20 DIAGNOSIS — Z86.79 PERSONAL HISTORY OF OTHER DISEASES OF THE CIRCULATORY SYSTEM: ICD-10-CM

## 2020-07-20 DIAGNOSIS — I63.89 OTHER CEREBRAL INFARCTION: ICD-10-CM

## 2020-07-20 DIAGNOSIS — E03.9 HYPOTHYROIDISM, UNSPECIFIED: ICD-10-CM

## 2020-07-20 DIAGNOSIS — I48.0 PAROXYSMAL ATRIAL FIBRILLATION: ICD-10-CM

## 2020-07-20 DIAGNOSIS — R52 PAIN, UNSPECIFIED: ICD-10-CM

## 2020-07-20 DIAGNOSIS — I10 ESSENTIAL (PRIMARY) HYPERTENSION: ICD-10-CM

## 2020-07-20 DIAGNOSIS — R56.9 UNSPECIFIED CONVULSIONS: ICD-10-CM

## 2020-07-20 DIAGNOSIS — E78.49 OTHER HYPERLIPIDEMIA: ICD-10-CM

## 2020-07-20 DIAGNOSIS — Z29.9 ENCOUNTER FOR PROPHYLACTIC MEASURES, UNSPECIFIED: ICD-10-CM

## 2020-07-20 DIAGNOSIS — G45.9 TRANSIENT CEREBRAL ISCHEMIC ATTACK, UNSPECIFIED: ICD-10-CM

## 2020-07-20 LAB
ALBUMIN SERPL ELPH-MCNC: 4.2 G/DL — SIGNIFICANT CHANGE UP (ref 3.3–5)
ALP SERPL-CCNC: 90 U/L — SIGNIFICANT CHANGE UP (ref 40–120)
ALT FLD-CCNC: 16 U/L — SIGNIFICANT CHANGE UP (ref 10–45)
ANION GAP SERPL CALC-SCNC: 14 MMOL/L — SIGNIFICANT CHANGE UP (ref 5–17)
APPEARANCE UR: CLEAR — SIGNIFICANT CHANGE UP
APTT BLD: 37.9 SEC — HIGH (ref 27.5–35.5)
AST SERPL-CCNC: 36 U/L — SIGNIFICANT CHANGE UP (ref 10–40)
BACTERIA # UR AUTO: NEGATIVE — SIGNIFICANT CHANGE UP
BASOPHILS # BLD AUTO: 0.02 K/UL — SIGNIFICANT CHANGE UP (ref 0–0.2)
BASOPHILS NFR BLD AUTO: 0.4 % — SIGNIFICANT CHANGE UP (ref 0–2)
BILIRUB SERPL-MCNC: 0.3 MG/DL — SIGNIFICANT CHANGE UP (ref 0.2–1.2)
BILIRUB UR-MCNC: NEGATIVE — SIGNIFICANT CHANGE UP
BUN SERPL-MCNC: 17 MG/DL — SIGNIFICANT CHANGE UP (ref 7–23)
CALCIUM SERPL-MCNC: 9.8 MG/DL — SIGNIFICANT CHANGE UP (ref 8.4–10.5)
CHLORIDE SERPL-SCNC: 103 MMOL/L — SIGNIFICANT CHANGE UP (ref 96–108)
CO2 SERPL-SCNC: 22 MMOL/L — SIGNIFICANT CHANGE UP (ref 22–31)
COLOR SPEC: SIGNIFICANT CHANGE UP
CREAT SERPL-MCNC: 0.73 MG/DL — SIGNIFICANT CHANGE UP (ref 0.5–1.3)
DIFF PNL FLD: NEGATIVE — SIGNIFICANT CHANGE UP
EOSINOPHIL # BLD AUTO: 0.14 K/UL — SIGNIFICANT CHANGE UP (ref 0–0.5)
EOSINOPHIL NFR BLD AUTO: 2.5 % — SIGNIFICANT CHANGE UP (ref 0–6)
EPI CELLS # UR: 0 /HPF — SIGNIFICANT CHANGE UP
GAS PNL BLDV: SIGNIFICANT CHANGE UP
GLUCOSE SERPL-MCNC: 94 MG/DL — SIGNIFICANT CHANGE UP (ref 70–99)
GLUCOSE UR QL: NEGATIVE — SIGNIFICANT CHANGE UP
HCT VFR BLD CALC: 37.1 % — SIGNIFICANT CHANGE UP (ref 34.5–45)
HGB BLD-MCNC: 11.8 G/DL — SIGNIFICANT CHANGE UP (ref 11.5–15.5)
HYALINE CASTS # UR AUTO: 0 /LPF — SIGNIFICANT CHANGE UP (ref 0–2)
IMM GRANULOCYTES NFR BLD AUTO: 0.2 % — SIGNIFICANT CHANGE UP (ref 0–1.5)
INR BLD: 1.21 RATIO — HIGH (ref 0.88–1.16)
KETONES UR-MCNC: NEGATIVE — SIGNIFICANT CHANGE UP
LEUKOCYTE ESTERASE UR-ACNC: NEGATIVE — SIGNIFICANT CHANGE UP
LYMPHOCYTES # BLD AUTO: 1.34 K/UL — SIGNIFICANT CHANGE UP (ref 1–3.3)
LYMPHOCYTES # BLD AUTO: 24 % — SIGNIFICANT CHANGE UP (ref 13–44)
MCHC RBC-ENTMCNC: 31.1 PG — SIGNIFICANT CHANGE UP (ref 27–34)
MCHC RBC-ENTMCNC: 31.8 GM/DL — LOW (ref 32–36)
MCV RBC AUTO: 97.6 FL — SIGNIFICANT CHANGE UP (ref 80–100)
MONOCYTES # BLD AUTO: 0.64 K/UL — SIGNIFICANT CHANGE UP (ref 0–0.9)
MONOCYTES NFR BLD AUTO: 11.5 % — SIGNIFICANT CHANGE UP (ref 2–14)
NEUTROPHILS # BLD AUTO: 3.43 K/UL — SIGNIFICANT CHANGE UP (ref 1.8–7.4)
NEUTROPHILS NFR BLD AUTO: 61.4 % — SIGNIFICANT CHANGE UP (ref 43–77)
NITRITE UR-MCNC: NEGATIVE — SIGNIFICANT CHANGE UP
NRBC # BLD: 0 /100 WBCS — SIGNIFICANT CHANGE UP (ref 0–0)
PH UR: 6 — SIGNIFICANT CHANGE UP (ref 5–8)
PLATELET # BLD AUTO: 242 K/UL — SIGNIFICANT CHANGE UP (ref 150–400)
POTASSIUM SERPL-MCNC: 5 MMOL/L — SIGNIFICANT CHANGE UP (ref 3.5–5.3)
POTASSIUM SERPL-SCNC: 5 MMOL/L — SIGNIFICANT CHANGE UP (ref 3.5–5.3)
PROT SERPL-MCNC: 7.2 G/DL — SIGNIFICANT CHANGE UP (ref 6–8.3)
PROT UR-MCNC: NEGATIVE — SIGNIFICANT CHANGE UP
PROTHROM AB SERPL-ACNC: 14.3 SEC — HIGH (ref 10.6–13.6)
RBC # BLD: 3.8 M/UL — SIGNIFICANT CHANGE UP (ref 3.8–5.2)
RBC # FLD: 13.3 % — SIGNIFICANT CHANGE UP (ref 10.3–14.5)
RBC CASTS # UR COMP ASSIST: 0 /HPF — SIGNIFICANT CHANGE UP (ref 0–4)
SARS-COV-2 IGG SERPL QL IA: NEGATIVE — SIGNIFICANT CHANGE UP
SARS-COV-2 IGM SERPL IA-ACNC: <3.8 AU/ML — SIGNIFICANT CHANGE UP
SARS-COV-2 RNA SPEC QL NAA+PROBE: SIGNIFICANT CHANGE UP
SODIUM SERPL-SCNC: 139 MMOL/L — SIGNIFICANT CHANGE UP (ref 135–145)
SP GR SPEC: 1.02 — SIGNIFICANT CHANGE UP (ref 1.01–1.02)
UROBILINOGEN FLD QL: NEGATIVE — SIGNIFICANT CHANGE UP
WBC # BLD: 5.58 K/UL — SIGNIFICANT CHANGE UP (ref 3.8–10.5)
WBC # FLD AUTO: 5.58 K/UL — SIGNIFICANT CHANGE UP (ref 3.8–10.5)
WBC UR QL: 2 /HPF — SIGNIFICANT CHANGE UP (ref 0–5)

## 2020-07-20 PROCEDURE — 70498 CT ANGIOGRAPHY NECK: CPT | Mod: 26

## 2020-07-20 PROCEDURE — 70551 MRI BRAIN STEM W/O DYE: CPT | Mod: 26

## 2020-07-20 PROCEDURE — 70496 CT ANGIOGRAPHY HEAD: CPT | Mod: 26

## 2020-07-20 PROCEDURE — 99223 1ST HOSP IP/OBS HIGH 75: CPT

## 2020-07-20 RX ORDER — ACETAMINOPHEN 500 MG
650 TABLET ORAL EVERY 6 HOURS
Refills: 0 | Status: DISCONTINUED | OUTPATIENT
Start: 2020-07-20 | End: 2020-07-27

## 2020-07-20 RX ORDER — OXYCODONE AND ACETAMINOPHEN 5; 325 MG/1; MG/1
1 TABLET ORAL EVERY 6 HOURS
Refills: 0 | Status: DISCONTINUED | OUTPATIENT
Start: 2020-07-20 | End: 2020-07-25

## 2020-07-20 RX ORDER — GABAPENTIN 400 MG/1
300 CAPSULE ORAL THREE TIMES A DAY
Refills: 0 | Status: DISCONTINUED | OUTPATIENT
Start: 2020-07-20 | End: 2020-07-26

## 2020-07-20 RX ORDER — LEVOTHYROXINE SODIUM 125 MCG
175 TABLET ORAL DAILY
Refills: 0 | Status: DISCONTINUED | OUTPATIENT
Start: 2020-07-20 | End: 2020-07-22

## 2020-07-20 RX ORDER — POTASSIUM CHLORIDE 20 MEQ
20 PACKET (EA) ORAL DAILY
Refills: 0 | Status: DISCONTINUED | OUTPATIENT
Start: 2020-07-20 | End: 2020-07-27

## 2020-07-20 RX ORDER — LEVETIRACETAM 250 MG/1
500 TABLET, FILM COATED ORAL ONCE
Refills: 0 | Status: COMPLETED | OUTPATIENT
Start: 2020-07-20 | End: 2020-07-20

## 2020-07-20 RX ORDER — LAMOTRIGINE 25 MG/1
300 TABLET, ORALLY DISINTEGRATING ORAL DAILY
Refills: 0 | Status: DISCONTINUED | OUTPATIENT
Start: 2020-07-20 | End: 2020-07-27

## 2020-07-20 RX ORDER — DULOXETINE HYDROCHLORIDE 30 MG/1
1 CAPSULE, DELAYED RELEASE ORAL
Qty: 0 | Refills: 0 | DISCHARGE

## 2020-07-20 RX ORDER — CARVEDILOL PHOSPHATE 80 MG/1
25 CAPSULE, EXTENDED RELEASE ORAL EVERY 12 HOURS
Refills: 0 | Status: DISCONTINUED | OUTPATIENT
Start: 2020-07-20 | End: 2020-07-27

## 2020-07-20 RX ORDER — ASPIRIN/CALCIUM CARB/MAGNESIUM 324 MG
1 TABLET ORAL
Qty: 0 | Refills: 0 | DISCHARGE

## 2020-07-20 RX ORDER — FUROSEMIDE 40 MG
1 TABLET ORAL
Qty: 0 | Refills: 0 | DISCHARGE

## 2020-07-20 RX ORDER — FENTANYL CITRATE 50 UG/ML
1 INJECTION INTRAVENOUS
Refills: 0 | Status: DISCONTINUED | OUTPATIENT
Start: 2020-07-20 | End: 2020-07-26

## 2020-07-20 RX ORDER — LEVETIRACETAM 250 MG/1
500 TABLET, FILM COATED ORAL
Refills: 0 | Status: DISCONTINUED | OUTPATIENT
Start: 2020-07-20 | End: 2020-07-27

## 2020-07-20 RX ORDER — OXYCODONE AND ACETAMINOPHEN 5; 325 MG/1; MG/1
2 TABLET ORAL EVERY 6 HOURS
Refills: 0 | Status: DISCONTINUED | OUTPATIENT
Start: 2020-07-20 | End: 2020-07-25

## 2020-07-20 RX ORDER — ATORVASTATIN CALCIUM 80 MG/1
1 TABLET, FILM COATED ORAL
Qty: 0 | Refills: 0 | DISCHARGE

## 2020-07-20 RX ORDER — OMEPRAZOLE 10 MG/1
1 CAPSULE, DELAYED RELEASE ORAL
Qty: 0 | Refills: 0 | DISCHARGE

## 2020-07-20 RX ORDER — ACETAMINOPHEN 500 MG
1000 TABLET ORAL ONCE
Refills: 0 | Status: COMPLETED | OUTPATIENT
Start: 2020-07-20 | End: 2020-07-20

## 2020-07-20 RX ORDER — CYCLOBENZAPRINE HYDROCHLORIDE 10 MG/1
10 TABLET, FILM COATED ORAL THREE TIMES A DAY
Refills: 0 | Status: DISCONTINUED | OUTPATIENT
Start: 2020-07-20 | End: 2020-07-25

## 2020-07-20 RX ORDER — ATORVASTATIN CALCIUM 80 MG/1
40 TABLET, FILM COATED ORAL AT BEDTIME
Refills: 0 | Status: DISCONTINUED | OUTPATIENT
Start: 2020-07-20 | End: 2020-07-27

## 2020-07-20 RX ORDER — LEVOTHYROXINE SODIUM 125 MCG
1 TABLET ORAL
Qty: 0 | Refills: 0 | DISCHARGE

## 2020-07-20 RX ORDER — APIXABAN 2.5 MG/1
5 TABLET, FILM COATED ORAL EVERY 12 HOURS
Refills: 0 | Status: DISCONTINUED | OUTPATIENT
Start: 2020-07-20 | End: 2020-07-27

## 2020-07-20 RX ORDER — FUROSEMIDE 40 MG
40 TABLET ORAL DAILY
Refills: 0 | Status: DISCONTINUED | OUTPATIENT
Start: 2020-07-20 | End: 2020-07-27

## 2020-07-20 RX ORDER — LOSARTAN POTASSIUM 100 MG/1
25 TABLET, FILM COATED ORAL DAILY
Refills: 0 | Status: DISCONTINUED | OUTPATIENT
Start: 2020-07-20 | End: 2020-07-20

## 2020-07-20 RX ADMIN — Medication 40 MILLIGRAM(S): at 11:23

## 2020-07-20 RX ADMIN — Medication 2 MILLIGRAM(S): at 04:23

## 2020-07-20 RX ADMIN — LAMOTRIGINE 300 MILLIGRAM(S): 25 TABLET, ORALLY DISINTEGRATING ORAL at 11:23

## 2020-07-20 RX ADMIN — LEVETIRACETAM 500 MILLIGRAM(S): 250 TABLET, FILM COATED ORAL at 04:23

## 2020-07-20 RX ADMIN — APIXABAN 5 MILLIGRAM(S): 2.5 TABLET, FILM COATED ORAL at 18:37

## 2020-07-20 RX ADMIN — Medication 400 MILLIGRAM(S): at 09:50

## 2020-07-20 RX ADMIN — FENTANYL CITRATE 1 PATCH: 50 INJECTION INTRAVENOUS at 23:24

## 2020-07-20 RX ADMIN — LEVETIRACETAM 500 MILLIGRAM(S): 250 TABLET, FILM COATED ORAL at 18:37

## 2020-07-20 RX ADMIN — GABAPENTIN 300 MILLIGRAM(S): 400 CAPSULE ORAL at 21:49

## 2020-07-20 RX ADMIN — ATORVASTATIN CALCIUM 40 MILLIGRAM(S): 80 TABLET, FILM COATED ORAL at 21:49

## 2020-07-20 RX ADMIN — CARVEDILOL PHOSPHATE 25 MILLIGRAM(S): 80 CAPSULE, EXTENDED RELEASE ORAL at 18:37

## 2020-07-20 RX ADMIN — Medication 1000 MILLIGRAM(S): at 10:05

## 2020-07-20 RX ADMIN — GABAPENTIN 300 MILLIGRAM(S): 400 CAPSULE ORAL at 15:27

## 2020-07-20 RX ADMIN — OXYCODONE AND ACETAMINOPHEN 2 TABLET(S): 5; 325 TABLET ORAL at 22:30

## 2020-07-20 RX ADMIN — OXYCODONE AND ACETAMINOPHEN 2 TABLET(S): 5; 325 TABLET ORAL at 21:51

## 2020-07-20 NOTE — PATIENT PROFILE ADULT - IS PATIENT POST-MENOPAUSAL?
Patient Education     Quad Set for Leg and Knee    This exercise is designed to stretch and strengthen your knee. Before beginning, read through all the instructions. While exercising, breathe normally and use smooth movements. If you feel any pain, stop the exercise. If pain continues, call your healthcare provider.  1. Sit on the floor with one leg straight, the other bent.  2. Flex the foot of your straight leg by pointing your toes toward you. Press the back of your knee into the floor while tightening the muscle on the top of your thigh. Hold for 3 to 5 seconds. Then relax.  3. Repeat 10 to 15 times. Do 3 to 5 sets a day.  Caution  · Don’t arch your back.  · Don’t hunch your shoulders.  Date Last Reviewed: 2/1/2018  © 9423-6716 [employer]. [address] [place] [address], [place] [address]. All rights reserved. This information is not intended as a substitute for professional medical care. Always follow your healthcare professional's instructions.           
yes

## 2020-07-20 NOTE — H&P ADULT - NSHPREVIEWOFSYSTEMS_GEN_ALL_CORE
GENERAL: No fevers, no chills.  EYES: No blurry vision,  No photophobia; + double vision  ENT: No sore throat.  No dysphagia  Cardiovascular: No chest pain, palpitations, orthopnea  Pulmonary: No cough, no wheezing. No shortness of breath  Gastrointestinal: No abdominal pain, no diarrhea, no constipation.    Musculoskeletal: + left sided weakness.  No myalgias.  Dermatology:  No rashes.  Neuro:+ left sided tingling. No Headache.  No vertigo.  No dizziness.  Psych: No anxiety, no depression.  Denies suicidal thoughts.

## 2020-07-20 NOTE — H&P ADULT - PROBLEM SELECTOR PLAN 1
- patient appears to be more confused than her baseline though she remains AAOX3-- new CVA/TIA vs. seizure disorder vs. orthostatis  - CT head unchanged, CTA head/neck negative for acute dissection  - MR brain ordered  - TTE with bubble study ordered  - EEG ordered  - orthostatics pending  - c/w telemetry  - COVID antibodies ordered; COVID19 PCR negative  - TSH, lipid profile, hba1c, ESR and C3/C4 ordered for the am   - neuro checks q6h  - c/w statin and eliquis   - c/w keppra 500 mg po bid for seizures  - appreciate neurology consult

## 2020-07-20 NOTE — ED ADULT NURSE REASSESSMENT NOTE - NS ED NURSE REASSESS COMMENT FT1
Patient resting comfortably in stretcher. Patient A&Ox4. Patient received prescribed home medications as per EMAR.  Denies chest pain, SOB, headache, N/V/D, abdominal pain, fever/chills. Call bell placed within reach. Plan of care discussed. Patient to be admitted to inpatient telemetry unit.

## 2020-07-20 NOTE — H&P ADULT - ASSESSMENT
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 new seizures vs. orthostatics.

## 2020-07-20 NOTE — H&P ADULT - NSICDXFAMILYHX_GEN_ALL_CORE_FT
FAMILY HISTORY:  Family history of pancreatic cancer    Grandparent  Still living? No  Family history of aneurysm, Age at diagnosis: Age Unknown  Family history of stomach cancer, Age at diagnosis: Age Unknown

## 2020-07-20 NOTE — ED ADULT NURSE REASSESSMENT NOTE - NS ED NURSE REASSESS COMMENT FT1
Report received from PACO Callejas. Pt presents to ED for AMS and confusion, admitted to telemetry for TIA. Pt A+Ox4, VSS, neuro assessment intact, left leg weak due to past CVA. Pt aware of plan of care, pending transport. Report given to PACO Stewart on [address] Cohen.

## 2020-07-20 NOTE — ED ADULT NURSE REASSESSMENT NOTE - NS ED NURSE REASSESS COMMENT FT1
Patient resting comfortably in stretcher. Patient A&Ox4. Patient speaking coherently in full sentences. Patient able to ambulate with the assistance of a walker. Call bell within reach. Plan of care discussed. Safety and comfort measures maintained.

## 2020-07-20 NOTE — CONSULT NOTE ADULT - SUBJECTIVE AND OBJECTIVE BOX
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 She notices that her lightheadedness occurs when she stands up and improves with lying supine. She saw her PCP on  who noted that her BP was very elevated and increased several of her BP medications. She improved on , but was not moving much. Her daughter reports that she had dysarthria. Patient was also calling her daughter to ask what day it was repeatedly and did not know whether it was day or nighttime. On , she felt lightheaded and unsteady when getting up to take a shower. She had sent her daughter a text message full of unintelligible text, which prompted her daughter to tell her to go to the hospital. Daughter reports diplopia and movement of objects on the wall. Daughter believes that the patient is depressed. Patient states that she has had similar episodes in the past. She denies vertigo, blurry vision, new weakness, new numbness, dysarthria, hearing changes, no tinnitus, fevers, chills, or dysphagia. She ambulates        History obtained from both patient and patient's daughter.    NIHSS: 3  MRS: 3    REVIEW OF SYSTEMS  A 10-system ROS was performed and is negative except for those items noted above and/or in the HPI.    PAST MEDICAL & SURGICAL HISTORY:  H/O aortic aneurysm: and thoracic aortic aneurysm  Moderate aortic stenosis  Subacute systemic lupus erythematosus  Cataract  Hypothyroid  Hip dislocation, left: avascular necrosis  Fibromyalgia  Uterine cancer  Spinal stenosis of lumbar region  Bipolar depression  HLD (hyperlipidemia)  HTN (hypertension)  H/O tubal ligation  History of cataract surgery  H/O total hysterectomy  H/O: : x2  History of hip surgery    FAMILY HISTORY:  Family history of aneurysm (Grandparent, Father): AAA  Family history of stomach cancer (Grandparent)  Family history of pancreatic cancer (Mother)    SOCIAL HISTORY:   T/E/D: former smoker (15 years of smoking).   Occupation: former supervisor     MEDICATIONS (HOME):  Home Medications:  acetaminophen 325 mg oral tablet: 2 tab(s) orally every 6 hours, As needed, Mild Pain (1 - 3) (01 Mar 2020 11:04)  cyclobenzaprine 10 mg oral tablet: 1 tab(s) orally 3 times a day (2020 06:20)  DULoxetine 60 mg oral delayed release capsule: 1 cap(s) orally 2 times a day (2020 06:20)  fentanyl topical 25 mcg/hr transdermal film, extended release (obsolete): 1 patch transdermal every 3 days (2020 06:20)  lamoTRIgine 300 mg oral tablet, extended release: 1 tab(s) orally once a day (2020 06:20)  levothyroxine 150 mcg (0.15 mg) oral tablet: 1 tab(s) orally once a day (2020 06:20)  LORazepam 2 mg oral tablet: 1 tab(s) orally 3 times a day (2020 06:20)  omeprazole 40 mg oral delayed release capsule: 1 cap(s) orally once a day (in the evening) (2020 06:20)  polyethylene glycol 3350 oral powder for reconstitution: 17 gram(s) orally once a day (01 Mar 2020 11:04)    MEDICATIONS  (STANDING):    MEDICATIONS  (PRN):    ALLERGIES/INTOLERANCES:  Allergies  Avelox (Rash)    Intolerances    VITALS & EXAMINATION:  Vital Signs Last 24 Hrs  T(C): 36.6 (2020 22:51), Max: 36.6 (2020 22:51)  T(F): 97.9 (2020 22:51), Max: 97.9 (2020 22:51)  HR: 78 (2020 23:15) (74 - 78)  BP: 142/71 (2020 23:15) (140/74 - 142/71)  BP(mean): --  RR: 16 (2020 23:15) (16 - 18)  SpO2: 96% (2020 23:15) (96% - 98%)    General: Obese Female, appears stated age, in no apparent distress including pain  Neck: no nuchal rigidity, supple    Neurological (>12):  MS: Awake, alert, oriented to person, place, situation, time. Normal affect. Follows all commands, including crossed commands.    Language: Speech is clear, fluent with good repetition & comprehension (able to name objects)    CNs: PERRL (R = 3mm, L = 3mm). VFF. EOMI. V1-3 intact to LT. No facial asymmetry b/l. Hearing grossly normal (rubbing fingers) b/l. Symmetric palate elevation in midline. Gag reflex deferred. Head turning & shoulder shrug intact b/l. Tongue midline, normal movements, no atrophy.    Fundoscopic: pale w/ sharp discs margins      Motor: Normal muscle bulk. Left pronator drift.              Deltoid	Biceps	Triceps	   R	5	5	5	5			 	  L	4+	4+	4+	4			    	H-Flex	H-Ext	H-ABd	H-ADd	K-Flex	K-Ext	D-Flex	P-Flex  R	3	3					5	5 	   L	3	3					5	5	     Sensation: Decreased to PP in the LLE. Intact to LT/Temp/Vibration b/l throughout.     Cortical: Extinction on DSS (neglect): none    Reflexes:              Biceps(C5)       BR(C6)     Triceps(C7)               Patellar(L4)    Achilles(S1)    Plantar Resp  R	2	          2	             2		        0		    0		mute   L	2	          3             3		        0		    0		mute    Coordination: Mild dysmetria to FTN on the left. Could not perform HTS due to hip/knee weakness.    Gait: Deferred    LABORATORY:  CBC                       11.8   5.58  )-----------( 242      ( 2020 00:18 )             37.1     Chem 07-20    139  |  103  |  17  ----------------------------<  94  5.0   |  22  |  0.73    Ca    9.8      2020 00:18    TPro  7.2  /  Alb  4.2  /  TBili  0.3  /  DBili  x   /  AST  36  /  ALT  16  /  AlkPhos  90  07-20    LFTs LIVER FUNCTIONS - ( 2020 00:18 )  Alb: 4.2 g/dL / Pro: 7.2 g/dL / ALK PHOS: 90 U/L / ALT: 16 U/L / AST: 36 U/L / GGT: x           Coagulopathy PT/INR - ( 2020 00:18 )   PT: 14.3 sec;   INR: 1.21 ratio         PTT - ( 2020 00:18 )  PTT:37.9 sec  Lipid Panel   A1c   Cardiac enzymes     U/A   CSF  Immunological  Other    STUDIES & IMAGING:  Studies (EKG, EEG, EMG, etc):     Radiology (XR, CT, MR, U/S, TTE/ELA):  < from: CT Head No Cont (20 @ 00:36) >    FINDINGS:     CT BRAIN:    There is no CT evidence of acute intracranial hemorrhage, abnormal extra-axial collection, mass effect, midline shift, hydrocephalus or acute territorial infarct.     Incidentally noted cavum septum pellucidum, a normal variant.     Chronic lacunar infarct of the right thalamus.     The visualized paranasal sinuses are clear. The mastoid air cells and middle ear cavities are clear. The native ocular lenses are surgically absent.    The calvarium is intact.    CT ANGIOGRAPHY NECK:    Three-vessel aortic arch. No evidence of dissection or hemodynamically significant stenosis. The origins of the great vessels are unremarkable.     The common carotid arteries are normal in caliber without significant stenosis.     The carotid bifurcations are unremarkable. Theinternal carotid arteries are normal in caliber without significant stenosis.     Co-dominant vertebral arteries. The vertebral arteries are normal in caliber without significant stenosis.     Visualized lung apices are clear.    Status post median sternotomy.    CT ANGIOGRAPHY BRAIN:    There is no evidence for significant stenosis, major vessel occlusion, or aneurysm about the [place].    There is no evidence for significant stenosis, major vessel occlusion, or aneurysm involving the vertebrobasilar system.    No enlarged vascular lesions or clusters of abnormal vessels are noted to suggest an arterial venous malformation.    Visualized portions of the superficial and deep venous systems are unremarkable.      IMPRESSION:     CT brain: No acute intracranial hemorrhage or CT evidence of acute transcortical infarct.     CT angiography neck: No evidence of hemodynamically significant stenosis by NASCET criteria. No evidence of vascular dissection.    CT angiography brain: No majorvessel occlusion or proximal stenosis by NASCET criteria. No evidence of aneurysm or other vascular malformation.      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 She notices that her lightheadedness occurs when she stands up and improves with lying supine. She saw her PCP on  who noted that her BP was very elevated and increased several of her BP medications. She improved on , but was not moving much. Her daughter reports that she had dysarthria. Patient was also calling her daughter to ask what day it was repeatedly and did not know whether it was day or nighttime. On , she felt lightheaded and unsteady when getting up to take a shower. She had sent her daughter a text message full of unintelligible text, which prompted her daughter to tell her to go to the hospital. Daughter reports diplopia and movement of objects on the wall. Daughter believes that the patient is depressed. Patient states that she has had similar episodes in the past. She denies vertigo, blurry vision, new weakness, new numbness, dysarthria, hearing changes, tinnitus, fevers, chills, or dysphagia. She ambulates using a cane, walker, or wheelchair at times.        History obtained from both patient and patient's daughter.    NIHSS: 3  MRS: 3    REVIEW OF SYSTEMS  A 10-system ROS was performed and is negative except for those items noted above and/or in the HPI.    PAST MEDICAL & SURGICAL HISTORY:  H/O aortic aneurysm: and thoracic aortic aneurysm  Moderate aortic stenosis  Subacute systemic lupus erythematosus  Cataract  Hypothyroid  Hip dislocation, left: avascular necrosis  Fibromyalgia  Uterine cancer  Spinal stenosis of lumbar region  Bipolar depression  HLD (hyperlipidemia)  HTN (hypertension)  H/O tubal ligation  History of cataract surgery  H/O total hysterectomy  H/O: : x2  History of hip surgery    FAMILY HISTORY:  Family history of aneurysm (Grandparent, Father): AAA  Family history of stomach cancer (Grandparent)  Family history of pancreatic cancer (Mother)    SOCIAL HISTORY:   T/E/D: former smoker (15 years of smoking).   Occupation: former supervisor     MEDICATIONS (HOME):  Home Medications:  acetaminophen 325 mg oral tablet: 2 tab(s) orally every 6 hours, As needed, Mild Pain (1 - 3) (01 Mar 2020 11:04)  cyclobenzaprine 10 mg oral tablet: 1 tab(s) orally 3 times a day (2020 06:20)  DULoxetine 60 mg oral delayed release capsule: 1 cap(s) orally 2 times a day (2020 06:20)  fentanyl topical 25 mcg/hr transdermal film, extended release (obsolete): 1 patch transdermal every 3 days (2020 06:20)  lamoTRIgine 300 mg oral tablet, extended release: 1 tab(s) orally once a day (2020 06:20)  levothyroxine 150 mcg (0.15 mg) oral tablet: 1 tab(s) orally once a day (2020 06:20)  LORazepam 2 mg oral tablet: 1 tab(s) orally 3 times a day (2020 06:20)  omeprazole 40 mg oral delayed release capsule: 1 cap(s) orally once a day (in the evening) (2020 06:20)  polyethylene glycol 3350 oral powder for reconstitution: 17 gram(s) orally once a day (01 Mar 2020 11:04)    MEDICATIONS  (STANDING):    MEDICATIONS  (PRN):    ALLERGIES/INTOLERANCES:  Allergies  Avelox (Rash)    Intolerances    VITALS & EXAMINATION:  Vital Signs Last 24 Hrs  T(C): 36.6 (2020 22:51), Max: 36.6 (2020 22:51)  T(F): 97.9 (2020 22:51), Max: 97.9 (2020 22:51)  HR: 78 (2020 23:15) (74 - 78)  BP: 142/71 (2020 23:15) (140/74 - 142/71)  BP(mean): --  RR: 16 (2020 23:15) (16 - 18)  SpO2: 96% (2020 23:15) (96% - 98%)    General: Obese Female, appears stated age, in no apparent distress including pain  Neck: no nuchal rigidity, supple    Neurological (>12):  MS: Awake, alert, oriented to person, place, situation, time. Normal affect. Follows all commands, including crossed commands.    Language: Speech is clear, fluent with good repetition & comprehension (able to name objects)    CNs: PERRL (R = 3mm, L = 3mm). VFF. EOMI. V1-3 intact to LT. No facial asymmetry b/l. Hearing grossly normal (rubbing fingers) b/l. Symmetric palate elevation in midline. Gag reflex deferred. Head turning & shoulder shrug intact b/l. Tongue midline, normal movements, no atrophy.    Fundoscopic: pale w/ sharp discs margins      Motor: Normal muscle bulk. Left pronator drift.              Deltoid	Biceps	Triceps	   R	5	5	5	5			 	  L	4+	4+	4+	4			    	H-Flex	H-Ext	H-ABd	H-ADd	K-Flex	K-Ext	D-Flex	P-Flex  R	3	3					5	5 	   L	3	3					5	5	     Sensation: Decreased to PP in the LLE. Intact to LT/Temp/Vibration b/l throughout.     Cortical: Extinction on DSS (neglect): none    Reflexes:              Biceps(C5)       BR(C6)     Triceps(C7)               Patellar(L4)    Achilles(S1)    Plantar Resp  R	2	          2	             2		        0		    0		mute   L	2	          3             3		        0		    0		mute    Coordination: Mild dysmetria to FTN on the left. Could not perform HTS due to hip/knee weakness.    Gait: Deferred    LABORATORY:  CBC                       11.8   5.58  )-----------( 242      ( 2020 00:18 )             37.1     Chem 07-20    139  |  103  |  17  ----------------------------<  94  5.0   |  22  |  0.73    Ca    9.8      2020 00:18    TPro  7.2  /  Alb  4.2  /  TBili  0.3  /  DBili  x   /  AST  36  /  ALT  16  /  AlkPhos  90  07-20    LFTs LIVER FUNCTIONS - ( 2020 00:18 )  Alb: 4.2 g/dL / Pro: 7.2 g/dL / ALK PHOS: 90 U/L / ALT: 16 U/L / AST: 36 U/L / GGT: x           Coagulopathy PT/INR - ( 2020 00:18 )   PT: 14.3 sec;   INR: 1.21 ratio         PTT - ( 2020 00:18 )  PTT:37.9 sec  Lipid Panel   A1c   Cardiac enzymes     U/A   CSF  Immunological  Other    STUDIES & IMAGING:  Studies (EKG, EEG, EMG, etc):     Radiology (XR, CT, MR, U/S, TTE/ELA):  < from: CT Head No Cont (20 @ 00:36) >    FINDINGS:     CT BRAIN:    There is no CT evidence of acute intracranial hemorrhage, abnormal extra-axial collection, mass effect, midline shift, hydrocephalus or acute territorial infarct.     Incidentally noted cavum septum pellucidum, a normal variant.     Chronic lacunar infarct of the right thalamus.     The visualized paranasal sinuses are clear. The mastoid air cells and middle ear cavities are clear. The native ocular lenses are surgically absent.    The calvarium is intact.    CT ANGIOGRAPHY NECK:    Three-vessel aortic arch. No evidence of dissection or hemodynamically significant stenosis. The origins of the great vessels are unremarkable.     The common carotid arteries are normal in caliber without significant stenosis.     The carotid bifurcations are unremarkable. Theinternal carotid arteries are normal in caliber without significant stenosis.     Co-dominant vertebral arteries. The vertebral arteries are normal in caliber without significant stenosis.     Visualized lung apices are clear.    Status post median sternotomy.    CT ANGIOGRAPHY BRAIN:    There is no evidence for significant stenosis, major vessel occlusion, or aneurysm about the [place] of [place].    There is no evidence for significant stenosis, major vessel occlusion, or aneurysm involving the vertebrobasilar system.    No enlarged vascular lesions or clusters of abnormal vessels are noted to suggest an arterial venous malformation.    Visualized portions of the superficial and deep venous systems are unremarkable.      IMPRESSION:     CT brain: No acute intracranial hemorrhage or CT evidence of acute transcortical infarct.     CT angiography neck: No evidence of hemodynamically significant stenosis by NASCET criteria. No evidence of vascular dissection.    CT angiography brain: No majorvessel occlusion or proximal stenosis by NASCET criteria. No evidence of aneurysm or other vascular malformation.      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 She notices that her lightheadedness occurs when she stands up and improves with lying supine. She saw her PCP on  who noted that her BP was very elevated and increased several of her BP medications. She improved on , but was not moving much. Her daughter reports that she had dysarthria. Patient was also calling her daughter to ask what day it was repeatedly and did not know whether it was day or nighttime. On , she felt lightheaded and unsteady when getting up to take a shower. She had sent her daughter a text message full of unintelligible text, which prompted her daughter to tell her to go to the hospital. Daughter reports patient had diplopia and noted movement of objects on the wall. Daughter believes that the patient is depressed. Patient states that she has had similar episodes in the past. She denies vertigo, blurry vision, diplopia, new weakness, new numbness, dysarthria, hearing changes, tinnitus, fevers, chills, or dysphagia. She ambulates using a cane, walker, or wheelchair at times.        History obtained from both patient and patient's daughter.    NIHSS: 3  MRS: 3    REVIEW OF SYSTEMS  A 10-system ROS was performed and is negative except for those items noted above and/or in the HPI.    PAST MEDICAL & SURGICAL HISTORY:  H/O aortic aneurysm: and thoracic aortic aneurysm  Moderate aortic stenosis  Subacute systemic lupus erythematosus  Cataract  Hypothyroid  Hip dislocation, left: avascular necrosis  Fibromyalgia  Uterine cancer  Spinal stenosis of lumbar region  Bipolar depression  HLD (hyperlipidemia)  HTN (hypertension)  H/O tubal ligation  History of cataract surgery  H/O total hysterectomy  H/O: : x2  History of hip surgery    FAMILY HISTORY:  Family history of aneurysm (Grandparent, Father): AAA  Family history of stomach cancer (Grandparent)  Family history of pancreatic cancer (Mother)    SOCIAL HISTORY:   T/E/D: former smoker (15 years of smoking).   Occupation: former supervisor     MEDICATIONS (HOME):  Home Medications:  acetaminophen 325 mg oral tablet: 2 tab(s) orally every 6 hours, As needed, Mild Pain (1 - 3) (01 Mar 2020 11:04)  cyclobenzaprine 10 mg oral tablet: 1 tab(s) orally 3 times a day (2020 06:20)  DULoxetine 60 mg oral delayed release capsule: 1 cap(s) orally 2 times a day (2020 06:20)  fentanyl topical 25 mcg/hr transdermal film, extended release (obsolete): 1 patch transdermal every 3 days (2020 06:20)  lamoTRIgine 300 mg oral tablet, extended release: 1 tab(s) orally once a day (2020 06:20)  levothyroxine 150 mcg (0.15 mg) oral tablet: 1 tab(s) orally once a day (2020 06:20)  LORazepam 2 mg oral tablet: 1 tab(s) orally 3 times a day (2020 06:20)  omeprazole 40 mg oral delayed release capsule: 1 cap(s) orally once a day (in the evening) (2020 06:20)  polyethylene glycol 3350 oral powder for reconstitution: 17 gram(s) orally once a day (01 Mar 2020 11:04)    MEDICATIONS  (STANDING):    MEDICATIONS  (PRN):    ALLERGIES/INTOLERANCES:  Allergies  Avelox (Rash)    Intolerances    VITALS & EXAMINATION:  Vital Signs Last 24 Hrs  T(C): 36.6 (2020 22:51), Max: 36.6 (2020 22:51)  T(F): 97.9 (2020 22:51), Max: 97.9 (2020 22:51)  HR: 78 (2020 23:15) (74 - 78)  BP: 142/71 (2020 23:15) (140/74 - 142/71)  BP(mean): --  RR: 16 (2020 23:15) (16 - 18)  SpO2: 96% (2020 23:15) (96% - 98%)    General: Obese Female, appears stated age, in no apparent distress including pain  Neck: no nuchal rigidity, supple    Neurological (>12):  MS: Awake, alert, oriented to person, place, situation, time. Normal affect. Follows all commands, including crossed commands.    Language: Speech is clear, fluent with good repetition & comprehension (able to name objects)    CNs: PERRL (R = 3mm, L = 3mm). VFF. EOMI. V1-3 intact to LT. No facial asymmetry b/l. Hearing grossly normal (rubbing fingers) b/l. Symmetric palate elevation in midline. Gag reflex deferred. Head turning & shoulder shrug intact b/l. Tongue midline, normal movements, no atrophy.    Fundoscopic: pale w/ sharp discs margins      Motor: Normal muscle bulk. Left pronator drift.              Deltoid	Biceps	Triceps	   R	5	5	5	5			 	  L	4+	4+	4+	4			    	H-Flex	H-Ext	H-ABd	H-ADd	K-Flex	K-Ext	D-Flex	P-Flex  R	3	3					5	5 	   L	3	3					5	5	     Sensation: Decreased to PP in the LLE. Intact to LT/Temp/Vibration b/l throughout.     Cortical: Extinction on DSS (neglect): none    Reflexes:              Biceps(C5)       BR(C6)     Triceps(C7)               Patellar(L4)    Achilles(S1)    Plantar Resp  R	2	          2	             2		        0		    0		mute   L	2	          3             3		        0		    0		mute    Coordination: Mild dysmetria to FTN on the left. Could not perform HTS due to hip/knee weakness.    Gait: Deferred    LABORATORY:  CBC                       11.8   5.58  )-----------( 242      ( 2020 00:18 )             37.1     Chem 07-20    139  |  103  |  17  ----------------------------<  94  5.0   |  22  |  0.73    Ca    9.8      2020 00:18    TPro  7.2  /  Alb  4.2  /  TBili  0.3  /  DBili  x   /  AST  36  /  ALT  16  /  AlkPhos  90  07-20    LFTs LIVER FUNCTIONS - ( 2020 00:18 )  Alb: 4.2 g/dL / Pro: 7.2 g/dL / ALK PHOS: 90 U/L / ALT: 16 U/L / AST: 36 U/L / GGT: x           Coagulopathy PT/INR - ( 2020 00:18 )   PT: 14.3 sec;   INR: 1.21 ratio         PTT - ( 2020 00:18 )  PTT:37.9 sec  Lipid Panel   A1c   Cardiac enzymes     U/A   CSF  Immunological  Other    STUDIES & IMAGING:  Studies (EKG, EEG, EMG, etc):     Radiology (XR, CT, MR, U/S, TTE/ELA):  < from: CT Head No Cont (20 @ 00:36) >    FINDINGS:     CT BRAIN:    There is no CT evidence of acute intracranial hemorrhage, abnormal extra-axial collection, mass effect, midline shift, hydrocephalus or acute territorial infarct.     Incidentally noted cavum septum pellucidum, a normal variant.     Chronic lacunar infarct of the right thalamus.     The visualized paranasal sinuses are clear. The mastoid air cells and middle ear cavities are clear. The native ocular lenses are surgically absent.    The calvarium is intact.    CT ANGIOGRAPHY NECK:    Three-vessel aortic arch. No evidence of dissection or hemodynamically significant stenosis. The origins of the great vessels are unremarkable.     The common carotid arteries are normal in caliber without significant stenosis.     The carotid bifurcations are unremarkable. Theinternal carotid arteries are normal in caliber without significant stenosis.     Co-dominant vertebral arteries. The vertebral arteries are normal in caliber without significant stenosis.     Visualized lung apices are clear.    Status post median sternotomy.    CT ANGIOGRAPHY BRAIN:    There is no evidence for significant stenosis, major vessel occlusion, or aneurysm about the [place] of [place].    There is no evidence for significant stenosis, major vessel occlusion, or aneurysm involving the vertebrobasilar system.    No enlarged vascular lesions or clusters of abnormal vessels are noted to suggest an arterial venous malformation.    Visualized portions of the superficial and deep venous systems are unremarkable.      IMPRESSION:     CT brain: No acute intracranial hemorrhage or CT evidence of acute transcortical infarct.     CT angiography neck: No evidence of hemodynamically significant stenosis by NASCET criteria. No evidence of vascular dissection.    CT angiography brain: No majorvessel occlusion or proximal stenosis by NASCET criteria. No evidence of aneurysm or other vascular malformation.      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 She notices that her lightheadedness occurs when she stands up and improves with lying supine. She saw her PCP on  who noted that her BP was very elevated and increased several of her BP medications. She improved on , but was not moving much. Her daughter reports that she had dysarthria. Patient was also calling her daughter to ask what day it was repeatedly and did not know whether it was day or nighttime. On , she felt lightheaded and unsteady when getting up to take a shower. She had sent her daughter a text message full of unintelligible text, which prompted her daughter to tell her to go to the hospital. Daughter reports patient had diplopia and noted movement of objects on the wall. Daughter believes that the patient is depressed. Patient states that she has had similar episodes in the past. She denies vertigo, blurry vision, diplopia, new weakness, new numbness, dysarthria, hearing changes, tinnitus, fevers, chills, or dysphagia. She ambulates using a cane, walker, or wheelchair at times.        History obtained from both patient and patient's daughter.    NIHSS: 3  MRS: 3    REVIEW OF SYSTEMS  A 10-system ROS was performed and is negative except for those items noted above and/or in the HPI.    PAST MEDICAL & SURGICAL HISTORY:  H/O aortic aneurysm: and thoracic aortic aneurysm  Moderate aortic stenosis  Subacute systemic lupus erythematosus  Cataract  Hypothyroid  Hip dislocation, left: avascular necrosis  Fibromyalgia  Uterine cancer  Spinal stenosis of lumbar region  Bipolar depression  HLD (hyperlipidemia)  HTN (hypertension)  H/O tubal ligation  History of cataract surgery  H/O total hysterectomy  H/O: : x2  History of hip surgery    FAMILY HISTORY:  Family history of aneurysm (Grandparent, Father): AAA  Family history of stomach cancer (Grandparent)  Family history of pancreatic cancer (Mother)    SOCIAL HISTORY:   T/E/D: former smoker (15 years of smoking).   Occupation: former supervisor     MEDICATIONS (HOME):  Home Medications:  acetaminophen 325 mg oral tablet: 2 tab(s) orally every 6 hours, As needed, Mild Pain (1 - 3) (01 Mar 2020 11:04)  cyclobenzaprine 10 mg oral tablet: 1 tab(s) orally 3 times a day (2020 06:20)  DULoxetine 60 mg oral delayed release capsule: 1 cap(s) orally 2 times a day (2020 06:20)  fentanyl topical 25 mcg/hr transdermal film, extended release (obsolete): 1 patch transdermal every 3 days (2020 06:20)  lamoTRIgine 300 mg oral tablet, extended release: 1 tab(s) orally once a day (2020 06:20)  levothyroxine 150 mcg (0.15 mg) oral tablet: 1 tab(s) orally once a day (2020 06:20)  LORazepam 2 mg oral tablet: 1 tab(s) orally 3 times a day (2020 06:20)  omeprazole 40 mg oral delayed release capsule: 1 cap(s) orally once a day (in the evening) (2020 06:20)  polyethylene glycol 3350 oral powder for reconstitution: 17 gram(s) orally once a day (01 Mar 2020 11:04)    MEDICATIONS  (STANDING):    MEDICATIONS  (PRN):    ALLERGIES/INTOLERANCES:  Allergies  Avelox (Rash)    Intolerances    VITALS & EXAMINATION:  Vital Signs Last 24 Hrs  T(C): 36.6 (2020 22:51), Max: 36.6 (2020 22:51)  T(F): 97.9 (2020 22:51), Max: 97.9 (2020 22:51)  HR: 78 (2020 23:15) (74 - 78)  BP: 142/71 (2020 23:15) (140/74 - 142/71)  BP(mean): --  RR: 16 (2020 23:15) (16 - 18)  SpO2: 96% (2020 23:15) (96% - 98%)    General: Obese Female, appears stated age, in no apparent distress including pain  Neck: no nuchal rigidity, supple    Neurological (>12):  MS: Awake, alert, oriented to person, place, situation, time. Normal affect. Follows all commands, including crossed commands.    Language: Speech is clear, fluent with good repetition & comprehension (able to name objects)    CNs: PERRL (R = 3mm, L = 3mm). VFF. EOMI. V1-3 intact to LT. No facial asymmetry b/l. Hearing grossly normal (rubbing fingers) b/l. Symmetric palate elevation in midline. Gag reflex deferred. Head turning & shoulder shrug intact b/l. Tongue midline, normal movements, no atrophy.    Fundoscopic: pale w/ sharp discs margins      Motor: Normal muscle bulk. Left pronator drift.              Deltoid	Biceps	Triceps	   R	5	5	5	5			 	  L	4+	4+	4+	4			    	H-Flex	H-Ext	H-ABd	H-ADd	K-Flex	K-Ext	D-Flex	P-Flex  R	3	3					5	5 	   L	3	3					5	5	     Sensation: Decreased to PP in the LLE. Intact to LT/Temp/Vibration b/l throughout.     Cortical: Extinction on DSS (neglect): none    Reflexes:              Biceps(C5)       BR(C6)     Triceps(C7)               Patellar(L4)    Achilles(S1)    Plantar Resp  R	2	          2	             2		        0		    0		mute   L	2	          3             3		        0		    0		mute    Coordination: Mild dysmetria to FTN on the left. Could not perform HTS due to hip/knee weakness.    Gait: Deferred    LABORATORY:  CBC                       11.8   5.58  )-----------( 242      ( 2020 00:18 )             37.1     Chem 07-20    139  |  103  |  17  ----------------------------<  94  5.0   |  22  |  0.73    Ca    9.8      2020 00:18    TPro  7.2  /  Alb  4.2  /  TBili  0.3  /  DBili  x   /  AST  36  /  ALT  16  /  AlkPhos  90  07-20    LFTs LIVER FUNCTIONS - ( 2020 00:18 )  Alb: 4.2 g/dL / Pro: 7.2 g/dL / ALK PHOS: 90 U/L / ALT: 16 U/L / AST: 36 U/L / GGT: x           Coagulopathy PT/INR - ( 2020 00:18 )   PT: 14.3 sec;   INR: 1.21 ratio         PTT - ( 2020 00:18 )  PTT:37.9 sec  Lipid Panel   A1c   Cardiac enzymes     U/A   CSF  Immunological  Other    STUDIES & IMAGING:  Studies (EKG, EEG, EMG, etc):     Radiology (XR, CT, MR, U/S, TTE/ELA):  < from: CT Head No Cont (20 @ 00:36) >    FINDINGS:     CT BRAIN:    There is no CT evidence of acute intracranial hemorrhage, abnormal extra-axial collection, mass effect, midline shift, hydrocephalus or acute territorial infarct.     Incidentally noted cavum septum pellucidum, a normal variant.     Chronic lacunar infarct of the right thalamus.     The visualized paranasal sinuses are clear. The mastoid air cells and middle ear cavities are clear. The native ocular lenses are surgically absent.    The calvarium is intact.    CT ANGIOGRAPHY NECK:    Three-vessel aortic arch. No evidence of dissection or hemodynamically significant stenosis. The origins of the great vessels are unremarkable.     The common carotid arteries are normal in caliber without significant stenosis.     The carotid bifurcations are unremarkable. Theinternal carotid arteries are normal in caliber without significant stenosis.     Co-dominant vertebral arteries. The vertebral arteries are normal in caliber without significant stenosis.     Visualized lung apices are clear.    Status post median sternotomy.    CT ANGIOGRAPHY BRAIN:    There is no evidence for significant stenosis, major vessel occlusion, or aneurysm about the [place].    There is no evidence for significant stenosis, major vessel occlusion, or aneurysm involving the vertebrobasilar system.    No enlarged vascular lesions or clusters of abnormal vessels are noted to suggest an arterial venous malformation.    Visualized portions of the superficial and deep venous systems are unremarkable.      IMPRESSION:     CT brain: No acute intracranial hemorrhage or CT evidence of acute transcortical infarct.     CT angiography neck: No evidence of hemodynamically significant stenosis by NASCET criteria. No evidence of vascular dissection.    CT angiography brain: No majorvessel occlusion or proximal stenosis by NASCET criteria. No evidence of aneurysm or other vascular malformation.      < end of copied text >

## 2020-07-20 NOTE — ED ADULT NURSE NOTE - NSIMPLEMENTINTERV_GEN_ALL_ED
Implemented All Fall with Harm Risk Interventions:  [place] to call system. Call bell, personal items and telephone within reach. Instruct patient to call for assistance. Room bathroom lighting operational. Non-slip footwear when patient is off stretcher. Physically safe environment: no spills, clutter or unnecessary equipment. Stretcher in lowest position, wheels locked, appropriate side rails in place. Provide visual cue, wrist band, yellow gown, etc. Monitor gait and stability. Monitor for mental status changes and reorient to person, place, and time. Review medications for side effects contributing to fall risk. Reinforce activity limits and safety measures with patient and family. Provide visual clues: red socks.

## 2020-07-20 NOTE — H&P ADULT - NSHPPHYSICALEXAM_GEN_ALL_CORE
Vital Signs Last 24 Hrs  T(C): 36.8 (20 Jul 2020 09:33), Max: 36.8 (20 Jul 2020 09:33)  T(F): 98.2 (20 Jul 2020 09:33), Max: 98.2 (20 Jul 2020 09:33)  HR: 62 (20 Jul 2020 08:00) (62 - 78)  BP: 164/94 (20 Jul 2020 08:00) (131/64 - 164/94)  BP(mean): --  RR: 18 (20 Jul 2020 09:33) (16 - 18)  SpO2: 95% (20 Jul 2020 09:33) (94% - 98%)    GENERAL: NAD  HEAD:  Atraumatic, Normocephalic  EYES: EOMI, PERRLA, conjunctiva and sclera clear  ENT: Pharynx not erythematous  PULMONARY: Clear to auscultation bilaterally; No wheeze  CARDIOVASCULAR: Regular rate and rhythm; No murmurs, rubs, or gallops  ABDOMEN: Soft, Nontender, Nondistended; Bowel sounds present  EXTREMITIES:  2+ Peripheral Pulses, No clubbing, cyanosis, or edema  MUSCULOSKELETAL: No calf tenderness  PSYCH: AAOx3, normal affect  NEUROLOGY: CN2-12 grossly intact, decreased sensation in left upper and lower extremities; 4/5 motor strength on left side; strength and sensation intact on right side; mild dysarthria, no facial drop  SKIN: warm and dry, No rashes or lesions

## 2020-07-20 NOTE — H&P ADULT - PROBLEM SELECTOR PLAN 9
- history of fibromyalgia  - c/w fentanyl patch 25 mg q3days and percocet 10/325 po q5h prn  - c/w ativan 2 mg po tid PRN for anxiety   - c/w neurontin 300 mg po tid for neuropathy   - c/w flexeril 10 mg po tid prn for spasm

## 2020-07-20 NOTE — H&P ADULT - HISTORY OF PRESENT ILLNESS
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 Patient also endorses lightheadedness. She notices that her lightheadedness occurs when she stands up and improves with lying supine. Her daughter reports that she had dysarthria. Patient was also calling her daughter to ask what day it was repeatedly and did not know whether it was day or nighttime. On 7/19, she felt lightheaded and unsteady when getting up to take a shower. She had sent her daughter a text message full of unintelligible text, which prompted her daughter to tell her to go to the hospital. Daughter reports patient had diplopia and noted movement of objects on the wall. Daughter believes that the patient is depressed. Patient states that she has had similar episodes in the past. She denies vertigo, blurry vision, diplopia, new weakness, new numbness, dysarthria, hearing changes, tinnitus, fevers, chills, or dysphagia. She ambulates using a cane, walker, or wheelchair at times. 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 Patient also endorses lightheadedness. She notices that her lightheadedness occurs when she stands up and improves with lying supine. Her daughter reports that she had dysarthria. Patient was also calling her daughter to ask what day it was repeatedly and did not know whether it was day or nighttime. On 7/19, she felt lightheaded and unsteady when getting up to take a shower. She had sent her daughter a text message full of unintelligible text, which prompted her daughter to tell her to go to the hospital. Daughter reports patient had diplopia and noted movement of objects on the wall. Daughter believes that the patient is depressed. Patient states that she has had similar episodes in the past. She denies vertigo, blurry vision, diplopia, new weakness, new numbness, dysarthria, hearing changes, tinnitus, fevers, chills, or dysphagia. She ambulates using a cane, walker, or wheelchair at times.

## 2020-07-20 NOTE — H&P ADULT - NSICDXPASTMEDICALHX_GEN_ALL_CORE_FT
PAST MEDICAL HISTORY:  Atrial fibrillation     Bipolar depression     Cataract     Cerebrovascular accident (CVA)     Fibromyalgia     H/O aortic aneurysm and thoracic aortic aneurysm    Hip dislocation, left avascular necrosis    HLD (hyperlipidemia)     HTN (hypertension)     Hypothyroid     Spinal stenosis of lumbar region     Subacute systemic lupus erythematosus     Uterine cancer

## 2020-07-20 NOTE — PHYSICAL THERAPY INITIAL EVALUATION ADULT - PRECAUTIONS/LIMITATIONS, REHAB EVAL
fall precautions/HISTORY AND RESULTS CONTINUED: CTH showed chronic R thalamic infarct. No acute findings on CTH or CTA Head/Neck.

## 2020-07-20 NOTE — ED ADULT NURSE NOTE - OBJECTIVE STATEMENT
Patient is a 69y female presenting to the ED ambulatory from home with c/o confusion. Patient A&Ox4. PMH of CVA, HTN, HLD, bipolar disorder, fibromyalgia, hypothyroid, SLE, RA. Patient reports "feeling off balance" today and had to sit down to regain her "bearings." According to the patient's daughter, the patient sent a text message that "didn't make any sense" which prompted her to instruct her mother to call EMS. Patient reports "feeling tired and weak" for the past few days. According to the patient's daughter, she received several calls from her mother repeatedly asking her what day it was and that the patient was altered. Upon arrival to [facility] ED, patient is speaking coherently. Patient is a 69y female presenting to the ED ambulatory from home with c/o confusion. Patient A&Ox4. PMH of CVA, HTN, HLD, bipolar disorder, fibromyalgia, hypothyroid, SLE, RA. Patient reports "feeling off balance" today and had to sit down to regain her "bearings." According to the patient's daughter, the patient sent a text message that "didn't make any sense" which prompted her to instruct her mother to call EMS. Patient reports "feeling tired and weak" for the past few days. According to the patient's daughter, she received several calls from her mother repeatedly asking her what day it was and that the patient was altered. Upon arrival to [facility] ED, patient is speaking coherently. PERRLA. Patient displays weakness to LLE which is residual weakness from previous CVA. No facial droop noted. Lung sounds clear bilaterally. Abdomen soft, non-distended and non-tender upon palpation. Denies chest pain, SOB, headache, N/V/D, abdominal pain, fever/chills, burning upon urination or difficulty urinating, hematuria.

## 2020-07-20 NOTE — CONSULT NOTE ADULT - ATTENDING COMMENTS
VASCULAR NEUROLOGY ATTENDING  The patient is seen and examined the history and imaging are reviewed. I agree with the resident note unless otherwise noted. Overall low suspicion for cerebrovascular event in addition the finding of an acute infarct is unlikely to . Would continue eliquis. Outpatient neurology follow-up.

## 2020-07-20 NOTE — PATIENT PROFILE ADULT - FUNCTIONAL SCREEN CURRENT LEVEL: SWALLOWING (IF SCORE 2 OR MORE FOR ANY ITEM, CONSULT REHAB SERVICES), MLM)
Medication refill request for: Lexapro  Last filled 8/16/17  with 30 dispensed and 0refills given.     Medication refill request for: Lisinopril  Last filled 8/16/17  with  30 dispensed and 0 refills given.     Last office visit: 9/19/16  Upcoming appointment scheduled for: none scheduled    Patient is due for an appointment.     
0 = swallows foods/liquids without difficulty

## 2020-07-20 NOTE — PHYSICAL THERAPY INITIAL EVALUATION ADULT - ADDITIONAL COMMENTS
Per care coordinator note, pt lives alone in a private home. Pt was ambulatory with a cane or rolling walker. Pt owns a wheelchair as well.

## 2020-07-20 NOTE — CONSULT NOTE ADULT - ASSESSMENT
Assessment: 69 year old RH F with a PMH of HTN, former tobacco use, hypothyroidism, atrial fibrillation (on eliquis), HLD, RA, OA, SLE, lumbar spinal stenosis, congenital hip deformation (AVN of left hip, s/p R hip replacement), uterine cancer (s/p EVELYN/?BSO in 2006), s/p AVR/ascending aortic arch aneurysm repair in 2/2020 who presented with episodes of lightheadedness, difficulty balancing herself, and fatigue since 7/17. Initial vital signs significant for a BP of 140/74. Physical exam significant for left hemiparesis, decreased sensation to PP in the LLE, and LUE hyperreflexia. CTH w/o showed chronic right thalamic infarct. CTA H/N w/ showed no LVO. Labs unremarkable.      Impression: Episodes of lightheadedness/fatigue possibly due to orthostatic hypotension/cardiogenic event. Consider possible TIA (considering episode with dysarthria/unintelligible text message), seizure appears less likely, low suspicion for lupus flare    Plan:  Imaging:  MRI brain w/o contrast   Routine EEG   TTE w/ bubble    Medications:  Continue aspirin and atorvastatin  Continue keppra/lamotrigine/gabapentin    Labs:  TSH, HgbA1c, Lipid panel, C3, C4, ESR    Other:  Check orthostatics  Telemetry monitoring  Neurochecks  Outpatient neurology follow up when ready for discharge (Dr. Navarrete or Dr. Vladimir Alicea [phone]).  Daughter's (Larissa Muñizs) phone number: [phone]    Case to be discussed with stroke attending, Dr. Alicea.

## 2020-07-20 NOTE — H&P ADULT - PROBLEM SELECTOR PLAN 3
- see above  - c/w keppra 500 mg po bid and lamictal 300 mg po daily   - EEG ordered  - neuro-checks q6h

## 2020-07-20 NOTE — H&P ADULT - PROBLEM SELECTOR PLAN 5
- bp acceptable   - c/w carvediolol 25 mg po bid  - patient started on losartan 100 mg po daily as outpatient on 7/19 but per my discussion with pharmacist, patient has not taken this medication as of yet - bp acceptable   - c/w carvediolol 25 mg po bid  - patient started on losartan 100 mg po daily as outpatient on 7/19 but per my discussion with pharmacist, patient has not taken this medication as of yet    # diastolic chf  - c/w lasix 40 mg po daily with potassium supplementation

## 2020-07-21 LAB
A1C WITH ESTIMATED AVERAGE GLUCOSE RESULT: 5.6 % — SIGNIFICANT CHANGE UP (ref 4–5.6)
ALBUMIN SERPL ELPH-MCNC: 3.9 G/DL — SIGNIFICANT CHANGE UP (ref 3.3–5)
ALP SERPL-CCNC: 88 U/L — SIGNIFICANT CHANGE UP (ref 40–120)
ALT FLD-CCNC: 13 U/L — SIGNIFICANT CHANGE UP (ref 10–45)
ANION GAP SERPL CALC-SCNC: 13 MMOL/L — SIGNIFICANT CHANGE UP (ref 5–17)
AST SERPL-CCNC: 14 U/L — SIGNIFICANT CHANGE UP (ref 10–40)
BASOPHILS # BLD AUTO: 0.02 K/UL — SIGNIFICANT CHANGE UP (ref 0–0.2)
BASOPHILS NFR BLD AUTO: 0.5 % — SIGNIFICANT CHANGE UP (ref 0–2)
BILIRUB SERPL-MCNC: 0.3 MG/DL — SIGNIFICANT CHANGE UP (ref 0.2–1.2)
BUN SERPL-MCNC: 16 MG/DL — SIGNIFICANT CHANGE UP (ref 7–23)
C3 SERPL-MCNC: 117 MG/DL — SIGNIFICANT CHANGE UP (ref 81–157)
C4 SERPL-MCNC: 28 MG/DL — SIGNIFICANT CHANGE UP (ref 13–39)
CALCIUM SERPL-MCNC: 9.6 MG/DL — SIGNIFICANT CHANGE UP (ref 8.4–10.5)
CHLORIDE SERPL-SCNC: 102 MMOL/L — SIGNIFICANT CHANGE UP (ref 96–108)
CHOLEST SERPL-MCNC: 174 MG/DL — SIGNIFICANT CHANGE UP (ref 10–199)
CO2 SERPL-SCNC: 27 MMOL/L — SIGNIFICANT CHANGE UP (ref 22–31)
CREAT SERPL-MCNC: 0.64 MG/DL — SIGNIFICANT CHANGE UP (ref 0.5–1.3)
CULTURE RESULTS: SIGNIFICANT CHANGE UP
EOSINOPHIL # BLD AUTO: 0.13 K/UL — SIGNIFICANT CHANGE UP (ref 0–0.5)
EOSINOPHIL NFR BLD AUTO: 3.1 % — SIGNIFICANT CHANGE UP (ref 0–6)
ERYTHROCYTE [SEDIMENTATION RATE] IN BLOOD: 47 MM/HR — HIGH (ref 0–20)
ESTIMATED AVERAGE GLUCOSE: 114 MG/DL — SIGNIFICANT CHANGE UP (ref 68–114)
GLUCOSE SERPL-MCNC: 108 MG/DL — HIGH (ref 70–99)
HCT VFR BLD CALC: 35.4 % — SIGNIFICANT CHANGE UP (ref 34.5–45)
HCV AB S/CO SERPL IA: 0.09 S/CO — SIGNIFICANT CHANGE UP (ref 0–0.99)
HCV AB SERPL-IMP: SIGNIFICANT CHANGE UP
HDLC SERPL-MCNC: 45 MG/DL — LOW
HGB BLD-MCNC: 11.4 G/DL — LOW (ref 11.5–15.5)
LIPID PNL WITH DIRECT LDL SERPL: 104 MG/DL — HIGH
LYMPHOCYTES # BLD AUTO: 1.32 K/UL — SIGNIFICANT CHANGE UP (ref 1–3.3)
LYMPHOCYTES # BLD AUTO: 31.2 % — SIGNIFICANT CHANGE UP (ref 13–44)
MCHC RBC-ENTMCNC: 31.3 PG — SIGNIFICANT CHANGE UP (ref 27–34)
MCHC RBC-ENTMCNC: 32.2 GM/DL — SIGNIFICANT CHANGE UP (ref 32–36)
MCV RBC AUTO: 97.3 FL — SIGNIFICANT CHANGE UP (ref 80–100)
MONOCYTES # BLD AUTO: 0.47 K/UL — SIGNIFICANT CHANGE UP (ref 0–0.9)
MONOCYTES NFR BLD AUTO: 11.1 % — SIGNIFICANT CHANGE UP (ref 2–14)
NEUTROPHILS # BLD AUTO: 2.29 K/UL — SIGNIFICANT CHANGE UP (ref 1.8–7.4)
NEUTROPHILS NFR BLD AUTO: 54.1 % — SIGNIFICANT CHANGE UP (ref 43–77)
NRBC # BLD: 0 /100 WBCS — SIGNIFICANT CHANGE UP (ref 0–0)
PLATELET # BLD AUTO: 228 K/UL — SIGNIFICANT CHANGE UP (ref 150–400)
POTASSIUM SERPL-MCNC: 3.4 MMOL/L — LOW (ref 3.5–5.3)
POTASSIUM SERPL-SCNC: 3.4 MMOL/L — LOW (ref 3.5–5.3)
PROT SERPL-MCNC: 6.7 G/DL — SIGNIFICANT CHANGE UP (ref 6–8.3)
RBC # BLD: 3.64 M/UL — LOW (ref 3.8–5.2)
RBC # FLD: 13.2 % — SIGNIFICANT CHANGE UP (ref 10.3–14.5)
SODIUM SERPL-SCNC: 142 MMOL/L — SIGNIFICANT CHANGE UP (ref 135–145)
SPECIMEN SOURCE: SIGNIFICANT CHANGE UP
TOTAL CHOLESTEROL/HDL RATIO MEASUREMENT: 3.9 RATIO — SIGNIFICANT CHANGE UP (ref 3.3–7.1)
TRIGL SERPL-MCNC: 126 MG/DL — SIGNIFICANT CHANGE UP (ref 10–149)
TSH SERPL-MCNC: 0.02 UIU/ML — LOW (ref 0.27–4.2)
WBC # BLD: 4.23 K/UL — SIGNIFICANT CHANGE UP (ref 3.8–10.5)
WBC # FLD AUTO: 4.23 K/UL — SIGNIFICANT CHANGE UP (ref 3.8–10.5)

## 2020-07-21 PROCEDURE — 99233 SBSQ HOSP IP/OBS HIGH 50: CPT

## 2020-07-21 PROCEDURE — 95720 EEG PHY/QHP EA INCR W/VEEG: CPT

## 2020-07-21 RX ADMIN — Medication 175 MICROGRAM(S): at 05:12

## 2020-07-21 RX ADMIN — OXYCODONE AND ACETAMINOPHEN 2 TABLET(S): 5; 325 TABLET ORAL at 20:45

## 2020-07-21 RX ADMIN — GABAPENTIN 300 MILLIGRAM(S): 400 CAPSULE ORAL at 13:03

## 2020-07-21 RX ADMIN — OXYCODONE AND ACETAMINOPHEN 2 TABLET(S): 5; 325 TABLET ORAL at 09:11

## 2020-07-21 RX ADMIN — CARVEDILOL PHOSPHATE 25 MILLIGRAM(S): 80 CAPSULE, EXTENDED RELEASE ORAL at 05:14

## 2020-07-21 RX ADMIN — Medication 40 MILLIGRAM(S): at 05:13

## 2020-07-21 RX ADMIN — Medication 2 MILLIGRAM(S): at 20:05

## 2020-07-21 RX ADMIN — APIXABAN 5 MILLIGRAM(S): 2.5 TABLET, FILM COATED ORAL at 18:00

## 2020-07-21 RX ADMIN — LAMOTRIGINE 300 MILLIGRAM(S): 25 TABLET, ORALLY DISINTEGRATING ORAL at 13:03

## 2020-07-21 RX ADMIN — FENTANYL CITRATE 1 PATCH: 50 INJECTION INTRAVENOUS at 08:21

## 2020-07-21 RX ADMIN — OXYCODONE AND ACETAMINOPHEN 2 TABLET(S): 5; 325 TABLET ORAL at 20:05

## 2020-07-21 RX ADMIN — OXYCODONE AND ACETAMINOPHEN 2 TABLET(S): 5; 325 TABLET ORAL at 09:41

## 2020-07-21 RX ADMIN — FENTANYL CITRATE 1 PATCH: 50 INJECTION INTRAVENOUS at 19:20

## 2020-07-21 RX ADMIN — GABAPENTIN 300 MILLIGRAM(S): 400 CAPSULE ORAL at 05:13

## 2020-07-21 RX ADMIN — ATORVASTATIN CALCIUM 40 MILLIGRAM(S): 80 TABLET, FILM COATED ORAL at 20:04

## 2020-07-21 RX ADMIN — APIXABAN 5 MILLIGRAM(S): 2.5 TABLET, FILM COATED ORAL at 05:14

## 2020-07-21 RX ADMIN — LEVETIRACETAM 500 MILLIGRAM(S): 250 TABLET, FILM COATED ORAL at 05:13

## 2020-07-21 RX ADMIN — Medication 20 MILLIEQUIVALENT(S): at 13:03

## 2020-07-21 RX ADMIN — CARVEDILOL PHOSPHATE 25 MILLIGRAM(S): 80 CAPSULE, EXTENDED RELEASE ORAL at 18:00

## 2020-07-21 RX ADMIN — Medication 2 MILLIGRAM(S): at 09:11

## 2020-07-21 RX ADMIN — CYCLOBENZAPRINE HYDROCHLORIDE 10 MILLIGRAM(S): 10 TABLET, FILM COATED ORAL at 20:05

## 2020-07-21 RX ADMIN — LEVETIRACETAM 500 MILLIGRAM(S): 250 TABLET, FILM COATED ORAL at 18:00

## 2020-07-21 RX ADMIN — GABAPENTIN 300 MILLIGRAM(S): 400 CAPSULE ORAL at 20:05

## 2020-07-21 NOTE — PHYSICAL THERAPY INITIAL EVALUATION ADULT - ADDITIONAL COMMENTS
MRI Head 7/20 IMPRESSION: No acute territorial infarct seen.  CT brain 7/20: No acute intracranial hemorrhage or CT evidence of acute transcortical infarct.   CT angiography neck 7/20: No evidence of hemodynamically significant stenosis by NASCET criteria. No evidence of vascular dissection.  CT angiography brain 7/20: No major vessel occlusion or proximal stenosis by NASCET criteria. No evidence of aneurysm or other vascular malformation.

## 2020-07-21 NOTE — PHYSICAL THERAPY INITIAL EVALUATION ADULT - IMPAIRMENTS CONTRIBUTING TO GAIT DEVIATIONS, PT EVAL
impaired balance/pain/decreased strength/decreased flexibility/decreased sensation/decreased ROM/dizziness

## 2020-07-21 NOTE — CHART NOTE - NSCHARTNOTEFT_GEN_A_CORE
PRELIMINARY EEG REPORT    EEG reviewed from 10:10 to 17:00  Date: 07-21-20    - No epileptiform pattern or seizure seen.    Felipe Pete MD PGY-5  Epilepsy Fellow PRELIMINARY EEG REPORT    EEG reviewed from 10:10 to 17:00  Date: 07-21-20    - No epileptiform pattern or seizure seen.      Felipe Pete MD PGY-5  Epilepsy Fellow

## 2020-07-21 NOTE — PHYSICAL THERAPY INITIAL EVALUATION ADULT - GENERAL OBSERVATIONS, REHAB EVAL
Pt received Pt received semi-supine in bed in NAD, +IVL, premedicated for pain by RN CARLEEN Stewart, (-) orthostatics, agreeable to participate in therapy at this time

## 2020-07-21 NOTE — PHYSICAL THERAPY INITIAL EVALUATION ADULT - ADL SKILLS, REHAB EVAL
independent/Pt reports receiving supervision for bathing/showering secondary to dizziness, and occasional assist for LBD

## 2020-07-21 NOTE — PROGRESS NOTE ADULT - SUBJECTIVE AND OBJECTIVE BOX
Patient is a 69y old  Female who presents with a chief complaint of left sided numbness (2020 10:45)      SUBJECTIVE / OVERNIGHT EVENTS:    MEDICATIONS  (STANDING):  apixaban 5 milliGRAM(s) Oral every 12 hours  atorvastatin 40 milliGRAM(s) Oral at bedtime  carvedilol 25 milliGRAM(s) Oral every 12 hours  fentaNYL   Patch  25 MICROgram(s)/Hr. 1 Patch Transdermal every 48 hours  furosemide    Tablet 40 milliGRAM(s) Oral daily  gabapentin 300 milliGRAM(s) Oral three times a day  lamoTRIgine 300 milliGRAM(s) Oral daily  levETIRAcetam 500 milliGRAM(s) Oral two times a day  levothyroxine 175 MICROGram(s) Oral daily  potassium chloride    Tablet ER 20 milliEquivalent(s) Oral daily    MEDICATIONS  (PRN):  acetaminophen   Tablet .. 650 milliGRAM(s) Oral every 6 hours PRN Mild Pain (1 - 3)  cyclobenzaprine 10 milliGRAM(s) Oral three times a day PRN Muscle Spasm  LORazepam     Tablet 2 milliGRAM(s) Oral three times a day PRN Anxiety  oxycodone    5 mG/acetaminophen 325 mG 2 Tablet(s) Oral every 6 hours PRN Moderate Pain (4 - 6)  oxycodone    5 mG/acetaminophen 325 mG 1 Tablet(s) Oral every 6 hours PRN Moderate Pain (4 - 6)      Vital Signs Last 24 Hrs  T(C): 36.4 (2020 07:21), Max: 36.7 (2020 20:03)  T(F): 97.5 (2020 07:21), Max: 98 (2020 20:03)  HR: 71 (2020 09:56) (60 - 71)  BP: 135/89 (2020 09:56) (100/64 - 150/84)  BP(mean): --  RR: 18 (2020 07:21) (18 - 18)  SpO2: 99% (2020 09:56) (94% - 99%)  CAPILLARY BLOOD GLUCOSE        I&O's Summary    2020 07:01  -  2020 07:00  --------------------------------------------------------  IN: 400 mL / OUT: 0 mL / NET: 400 mL        PHYSICAL EXAM:  GENERAL: NAD, well-developed  HEAD:  Atraumatic, Normocephalic  EYES: EOMI, PERRLA, conjunctiva and sclera clear  NECK: Supple, No JVD  CHEST/LUNG: Clear to auscultation bilaterally; No wheeze  HEART: Regular rate and rhythm; No murmurs, rubs, or gallops  ABDOMEN: Soft, Nontender, Nondistended; Bowel sounds present  EXTREMITIES:  2+ Peripheral Pulses, No clubbing, cyanosis, or edema  PSYCH: AAOx3  NEUROLOGY: non-focal  SKIN: No rashes or lesions    LABS:                        11.4   4.23  )-----------( 228      ( 2020 06:18 )             35.4     07-21    142  |  102  |  16  ----------------------------<  108<H>  3.4<L>   |  27  |  0.64    Ca    9.6      2020 06:18    TPro  6.7  /  Alb  3.9  /  TBili  0.3  /  DBili  x   /  AST  14  /  ALT  13  /  AlkPhos  88  07-21    PT/INR - ( 2020 00:18 )   PT: 14.3 sec;   INR: 1.21 ratio         PTT - ( 2020 00:18 )  PTT:37.9 sec      Urinalysis Basic - ( 2020 13:29 )    Color: Light Yellow / Appearance: Clear / S.024 / pH: x  Gluc: x / Ketone: Negative  / Bili: Negative / Urobili: Negative   Blood: x / Protein: Negative / Nitrite: Negative   Leuk Esterase: Negative / RBC: 0 /hpf / WBC 2 /HPF   Sq Epi: x / Non Sq Epi: 0 /hpf / Bacteria: Negative        RADIOLOGY & ADDITIONAL TESTS:    Imaging Personally Reviewed:    Consultant(s) Notes Reviewed:      Care Discussed with Consultants/Other Providers: Patient is a 69y old  Female who presents with a chief complaint of left sided numbness (2020 10:45)      SUBJECTIVE / OVERNIGHT EVENTS:  Pt seen and examine. No acute events overnight. c/o headache. Also c/o dizziness and feeling unsteady on standing up. EEG in progress.    MEDICATIONS  (STANDING):  apixaban 5 milliGRAM(s) Oral every 12 hours  atorvastatin 40 milliGRAM(s) Oral at bedtime  carvedilol 25 milliGRAM(s) Oral every 12 hours  fentaNYL   Patch  25 MICROgram(s)/Hr. 1 Patch Transdermal every 48 hours  furosemide    Tablet 40 milliGRAM(s) Oral daily  gabapentin 300 milliGRAM(s) Oral three times a day  lamoTRIgine 300 milliGRAM(s) Oral daily  levETIRAcetam 500 milliGRAM(s) Oral two times a day  levothyroxine 175 MICROGram(s) Oral daily  potassium chloride    Tablet ER 20 milliEquivalent(s) Oral daily    MEDICATIONS  (PRN):  acetaminophen   Tablet .. 650 milliGRAM(s) Oral every 6 hours PRN Mild Pain (1 - 3)  cyclobenzaprine 10 milliGRAM(s) Oral three times a day PRN Muscle Spasm  LORazepam     Tablet 2 milliGRAM(s) Oral three times a day PRN Anxiety  oxycodone    5 mG/acetaminophen 325 mG 2 Tablet(s) Oral every 6 hours PRN Moderate Pain (4 - 6)  oxycodone    5 mG/acetaminophen 325 mG 1 Tablet(s) Oral every 6 hours PRN Moderate Pain (4 - 6)      Vital Signs Last 24 Hrs  T(C): 36.4 (2020 07:21), Max: 36.7 (2020 20:03)  T(F): 97.5 (2020 07:21), Max: 98 (2020 20:03)  HR: 71 (2020 09:56) (60 - 71)  BP: 135/89 (2020 09:56) (100/64 - 150/84)  BP(mean): --  RR: 18 (2020 07:21) (18 - 18)  SpO2: 99% (2020 09:56) (94% - 99%)  CAPILLARY BLOOD GLUCOSE        I&O's Summary    2020 07:01  -  2020 07:00  --------------------------------------------------------  IN: 400 mL / OUT: 0 mL / NET: 400 mL        PHYSICAL EXAM:  	GENERAL: NAD  	HEAD:  Atraumatic, Normocephalic  	EYES: EOMI, PERRLA, conjunctiva and sclera clear  	ENT: Pharynx not erythematous  	PULMONARY: Clear to auscultation bilaterally; No wheeze  	CARDIOVASCULAR: Regular rate and rhythm; No murmurs, rubs, or gallops  	ABDOMEN: Soft, Nontender, Nondistended; Bowel sounds present  	EXTREMITIES:  2+ Peripheral Pulses, No clubbing, cyanosis, or edema  	MUSCULOSKELETAL: No calf tenderness  	PSYCH: AAOx3, normal affect  	NEUROLOGY: CN2-12 grossly intact, decreased sensation in left upper and lower extremities; 4/5 motor strength on left side; strength and sensation intact on right side; mild dysarthria, no facial droop    LABS:                        11.4   4.23  )-----------( 228      ( 2020 06:18 )             35.4     07-21    142  |  102  |  16  ----------------------------<  108<H>  3.4<L>   |  27  |  0.64    Ca    9.6      2020 06:18    TPro  6.7  /  Alb  3.9  /  TBili  0.3  /  DBili  x   /  AST  14  /  ALT  13  /  AlkPhos  88  07-21    PT/INR - ( 2020 00:18 )   PT: 14.3 sec;   INR: 1.21 ratio         PTT - ( 2020 00:18 )  PTT:37.9 sec      Urinalysis Basic - ( 2020 13:29 )    Color: Light Yellow / Appearance: Clear / S.024 / pH: x  Gluc: x / Ketone: Negative  / Bili: Negative / Urobili: Negative   Blood: x / Protein: Negative / Nitrite: Negative   Leuk Esterase: Negative / RBC: 0 /hpf / WBC 2 /HPF   Sq Epi: x / Non Sq Epi: 0 /hpf / Bacteria: Negative          RADIOLOGY & ADDITIONAL TESTS:    Imaging Personally Reviewed:  MR BRAIN   2020     No acute territorial infarct seen.      Consultant(s) Notes Reviewed:  Neurology

## 2020-07-21 NOTE — CHART NOTE - NSCHARTNOTEFT_GEN_A_CORE
PRELIMINARY EEG REPORT    EEG reviewed from 10:10 - 11:16  Date: 07-21-20      - No epileptiform pattern or seizure seen.      ____________________________________    Raissa Loza MD  Attending Physician, [facility]

## 2020-07-21 NOTE — PHYSICAL THERAPY INITIAL EVALUATION ADULT - ASR WT BEARING STATUS EVAL
Called and got VM    We can add Gabapentin 300 mg TID, ordered and sent to pharmacy on file    We can take her off work if needed    She is on prednisone taper, has Norco and Valium. MRI shows L3-4 hnp. L4-5-S1 spondylosis. No high grade stenosis.
no weight-bearing restrictions
no weight-bearing restrictions

## 2020-07-21 NOTE — PHARMACOTHERAPY INTERVENTION NOTE - COMMENTS
Spoke with patient regarding use, side effect, monitoring, administration, frequency, compliance. Patient able to teach back information

## 2020-07-21 NOTE — PHYSICAL THERAPY INITIAL EVALUATION ADULT - PERTINENT HX OF CURRENT PROBLEM, REHAB EVAL
70 y/o right handed F with PMH of R sided ischemic infarct (2/2020, diplopia at the time, residual L sided hemiparesis/paresthesias) following AVR/ascending aortic arch aneurysm repair, Afib (eliquis), seizure disorder, HTN, HLD, RA, OA, SLE, lumbar spinal stenosis, AVN L hip s/p THR, uterine cancer (s/p EVELYN/?BSO), now p/w confusion and balance deficits. Pt reports lightheadedness in standing, and daughter reports episode of dysarthria and unintelligible texts from pt.
69 year old RH F with a PMHx of right sided ischemic infarct (2/2020) (had diplopia at the time, now has residual left sided numbness/weakness/pain) following AVR/ ascending aortic arch anheurysm, atrial fibrillation (on eliquis), seizure disorder (diagnosed in 4/2020 at [facility]),CONTINUED:

## 2020-07-21 NOTE — PHYSICAL THERAPY INITIAL EVALUATION ADULT - STRENGTHENING, PT EVAL
GOAL: Pt will increase b Le strength by at least 1/2 grade to improve ease with functional mobility within 3-4 wks.

## 2020-07-21 NOTE — PHYSICAL THERAPY INITIAL EVALUATION ADULT - PRECAUTIONS/LIMITATIONS, REHAB EVAL
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 new seizures vs. orthostatics. 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 new seizures vs. orthostatics. Pt reports lightheadedness in standing, and daughter reports episode of dysarthria and unintelligible texts from pt.

## 2020-07-21 NOTE — PHYSICAL THERAPY INITIAL EVALUATION ADULT - GAIT DEVIATIONS NOTED, PT EVAL
decreased stride length/decreased weight-shifting ability/leg length discrepancy/decreased han/decreased step length

## 2020-07-21 NOTE — PHYSICAL THERAPY INITIAL EVALUATION ADULT - IMPAIRED TRANSFERS: SIT/STAND, REHAB EVAL
impaired balance/decreased strength/pain/decreased flexibility/decreased sensation/dizziness, leg length discrepancy/decreased ROM

## 2020-07-21 NOTE — PROVIDER CONTACT NOTE (OTHER) - SITUATION
pt daughter called unit to let nurse know of pt hallucinating last night, seeing animals moving on arm and molding moving on the walls, pt did not let nurse know, happened around 1230/0100am

## 2020-07-21 NOTE — PHYSICAL THERAPY INITIAL EVALUATION ADULT - LIVES WITH, PROFILE
Pt reports being functionally independent at baseline. States she was transferring and ambulating independently with RW, receiving occasional assist for LBD, supervision for showering due to dizziness/head pain. Upon discharge pt reports she will be staying with her partner in his home in [place], with no steps to enter, +1 step inside. (-) driving/significant other

## 2020-07-21 NOTE — PHYSICAL THERAPY INITIAL EVALUATION ADULT - MANUAL MUSCLE TESTING RESULTS, REHAB EVAL
grossly assessed due to/R UE: at least 4/5 throughout, L UE at least 3/5 throughout except L shoulder: 3-/5, R knee ext: 4/5, L knee ext: 3+/5, bilateral ankles 4/5, bilateral hip flexion not assessed secondary to pain. Pt reports needing a L hip and R knee replacement

## 2020-07-22 LAB
ANION GAP SERPL CALC-SCNC: 13 MMOL/L — SIGNIFICANT CHANGE UP (ref 5–17)
BUN SERPL-MCNC: 22 MG/DL — SIGNIFICANT CHANGE UP (ref 7–23)
CALCIUM SERPL-MCNC: 9.6 MG/DL — SIGNIFICANT CHANGE UP (ref 8.4–10.5)
CHLORIDE SERPL-SCNC: 103 MMOL/L — SIGNIFICANT CHANGE UP (ref 96–108)
CO2 SERPL-SCNC: 25 MMOL/L — SIGNIFICANT CHANGE UP (ref 22–31)
CREAT SERPL-MCNC: 0.75 MG/DL — SIGNIFICANT CHANGE UP (ref 0.5–1.3)
GLUCOSE SERPL-MCNC: 103 MG/DL — HIGH (ref 70–99)
HCT VFR BLD CALC: 35.7 % — SIGNIFICANT CHANGE UP (ref 34.5–45)
HGB BLD-MCNC: 11.5 G/DL — SIGNIFICANT CHANGE UP (ref 11.5–15.5)
MCHC RBC-ENTMCNC: 31.3 PG — SIGNIFICANT CHANGE UP (ref 27–34)
MCHC RBC-ENTMCNC: 32.2 GM/DL — SIGNIFICANT CHANGE UP (ref 32–36)
MCV RBC AUTO: 97.3 FL — SIGNIFICANT CHANGE UP (ref 80–100)
NRBC # BLD: 0 /100 WBCS — SIGNIFICANT CHANGE UP (ref 0–0)
PLATELET # BLD AUTO: 235 K/UL — SIGNIFICANT CHANGE UP (ref 150–400)
POTASSIUM SERPL-MCNC: 3.8 MMOL/L — SIGNIFICANT CHANGE UP (ref 3.5–5.3)
POTASSIUM SERPL-SCNC: 3.8 MMOL/L — SIGNIFICANT CHANGE UP (ref 3.5–5.3)
RBC # BLD: 3.67 M/UL — LOW (ref 3.8–5.2)
RBC # FLD: 13.4 % — SIGNIFICANT CHANGE UP (ref 10.3–14.5)
SODIUM SERPL-SCNC: 141 MMOL/L — SIGNIFICANT CHANGE UP (ref 135–145)
T3FREE SERPL-MCNC: 2.82 PG/ML — SIGNIFICANT CHANGE UP (ref 1.8–4.6)
T4 FREE SERPL-MCNC: 2.1 NG/DL — HIGH (ref 0.9–1.8)
WBC # BLD: 4.48 K/UL — SIGNIFICANT CHANGE UP (ref 3.8–10.5)
WBC # FLD AUTO: 4.48 K/UL — SIGNIFICANT CHANGE UP (ref 3.8–10.5)

## 2020-07-22 PROCEDURE — 99233 SBSQ HOSP IP/OBS HIGH 50: CPT

## 2020-07-22 RX ORDER — LEVOTHYROXINE SODIUM 125 MCG
150 TABLET ORAL DAILY
Refills: 0 | Status: DISCONTINUED | OUTPATIENT
Start: 2020-07-23 | End: 2020-07-27

## 2020-07-22 RX ADMIN — Medication 2 MILLIGRAM(S): at 08:31

## 2020-07-22 RX ADMIN — CARVEDILOL PHOSPHATE 25 MILLIGRAM(S): 80 CAPSULE, EXTENDED RELEASE ORAL at 17:03

## 2020-07-22 RX ADMIN — APIXABAN 5 MILLIGRAM(S): 2.5 TABLET, FILM COATED ORAL at 05:51

## 2020-07-22 RX ADMIN — OXYCODONE AND ACETAMINOPHEN 2 TABLET(S): 5; 325 TABLET ORAL at 17:49

## 2020-07-22 RX ADMIN — LEVETIRACETAM 500 MILLIGRAM(S): 250 TABLET, FILM COATED ORAL at 05:51

## 2020-07-22 RX ADMIN — FENTANYL CITRATE 1 PATCH: 50 INJECTION INTRAVENOUS at 19:40

## 2020-07-22 RX ADMIN — ATORVASTATIN CALCIUM 40 MILLIGRAM(S): 80 TABLET, FILM COATED ORAL at 22:11

## 2020-07-22 RX ADMIN — Medication 2 MILLIGRAM(S): at 17:49

## 2020-07-22 RX ADMIN — GABAPENTIN 300 MILLIGRAM(S): 400 CAPSULE ORAL at 22:11

## 2020-07-22 RX ADMIN — LEVETIRACETAM 500 MILLIGRAM(S): 250 TABLET, FILM COATED ORAL at 17:03

## 2020-07-22 RX ADMIN — OXYCODONE AND ACETAMINOPHEN 2 TABLET(S): 5; 325 TABLET ORAL at 09:09

## 2020-07-22 RX ADMIN — APIXABAN 5 MILLIGRAM(S): 2.5 TABLET, FILM COATED ORAL at 17:03

## 2020-07-22 RX ADMIN — FENTANYL CITRATE 1 PATCH: 50 INJECTION INTRAVENOUS at 08:00

## 2020-07-22 RX ADMIN — Medication 40 MILLIGRAM(S): at 05:51

## 2020-07-22 RX ADMIN — FENTANYL CITRATE 1 PATCH: 50 INJECTION INTRAVENOUS at 22:18

## 2020-07-22 RX ADMIN — Medication 175 MICROGRAM(S): at 05:51

## 2020-07-22 RX ADMIN — GABAPENTIN 300 MILLIGRAM(S): 400 CAPSULE ORAL at 05:51

## 2020-07-22 RX ADMIN — CARVEDILOL PHOSPHATE 25 MILLIGRAM(S): 80 CAPSULE, EXTENDED RELEASE ORAL at 05:51

## 2020-07-22 RX ADMIN — OXYCODONE AND ACETAMINOPHEN 2 TABLET(S): 5; 325 TABLET ORAL at 18:26

## 2020-07-22 RX ADMIN — Medication 20 MILLIEQUIVALENT(S): at 11:29

## 2020-07-22 RX ADMIN — LAMOTRIGINE 300 MILLIGRAM(S): 25 TABLET, ORALLY DISINTEGRATING ORAL at 11:29

## 2020-07-22 RX ADMIN — OXYCODONE AND ACETAMINOPHEN 2 TABLET(S): 5; 325 TABLET ORAL at 08:31

## 2020-07-22 RX ADMIN — FENTANYL CITRATE 1 PATCH: 50 INJECTION INTRAVENOUS at 22:12

## 2020-07-22 RX ADMIN — GABAPENTIN 300 MILLIGRAM(S): 400 CAPSULE ORAL at 13:54

## 2020-07-22 NOTE — CHART NOTE - NSCHARTNOTEFT_GEN_A_CORE
Called by RN to report pt with c/o double vision. When pt seen at bedside, denied double vision at this time. Stroke team to f/u. Attending Dr. Ott notified. Will continue to closely monitor pt.     Joana Urrutia  [id number] Called by RN to report pt with c/o double vision. When pt seen at bedside, denied double vision at this time. Stroke team to f/u. Spoke with resident from Stroke team and to continue current management with Jonny. Attending Dr. Ott notified. Will continue to closely monitor pt.     Joana Urrutia  [id number]

## 2020-07-22 NOTE — PROGRESS NOTE ADULT - PROBLEM SELECTOR PLAN 8
- AVR (tissue) and ascending aorta replacement with Dr. Alvares on 2/19 - AVR (tissue) and ascending aorta replacement with Dr. Alvares on 2/19  - has been unable to f/up on account of - AVR (tissue) and ascending aorta replacement with Dr. Alvares on 2/19  - has been unable to f/up out pt on account of covid pandemic

## 2020-07-22 NOTE — PROGRESS NOTE ADULT - SUBJECTIVE AND OBJECTIVE BOX
Patient is a 69y old  Female who presents with a chief complaint of left sided numbness (2020 10:29)      SUBJECTIVE / OVERNIGHT EVENTS:    MEDICATIONS  (STANDING):  apixaban 5 milliGRAM(s) Oral every 12 hours  atorvastatin 40 milliGRAM(s) Oral at bedtime  carvedilol 25 milliGRAM(s) Oral every 12 hours  fentaNYL   Patch  25 MICROgram(s)/Hr. 1 Patch Transdermal every 48 hours  furosemide    Tablet 40 milliGRAM(s) Oral daily  gabapentin 300 milliGRAM(s) Oral three times a day  lamoTRIgine 300 milliGRAM(s) Oral daily  levETIRAcetam 500 milliGRAM(s) Oral two times a day  levothyroxine 175 MICROGram(s) Oral daily  potassium chloride    Tablet ER 20 milliEquivalent(s) Oral daily    MEDICATIONS  (PRN):  acetaminophen   Tablet .. 650 milliGRAM(s) Oral every 6 hours PRN Mild Pain (1 - 3)  cyclobenzaprine 10 milliGRAM(s) Oral three times a day PRN Muscle Spasm  LORazepam     Tablet 2 milliGRAM(s) Oral three times a day PRN Anxiety  oxycodone    5 mG/acetaminophen 325 mG 2 Tablet(s) Oral every 6 hours PRN Moderate Pain (4 - 6)  oxycodone    5 mG/acetaminophen 325 mG 1 Tablet(s) Oral every 6 hours PRN Moderate Pain (4 - 6)      Vital Signs Last 24 Hrs  T(C): 36.4 (2020 07:02), Max: 36.7 (2020 11:19)  T(F): 97.6 (2020 07:02), Max: 98 (2020 11:19)  HR: 57 (2020 07:02) (57 - 70)  BP: 126/78 (2020 07:02) (101/66 - 139/79)  BP(mean): --  RR: 18 (2020 07:02) (18 - 18)  SpO2: 94% (2020 07:02) (94% - 98%)  CAPILLARY BLOOD GLUCOSE        I&O's Summary    2020 07:01  -  2020 07:00  --------------------------------------------------------  IN: 730 mL / OUT: 0 mL / NET: 730 mL    2020 07:01  -  2020 10:17  --------------------------------------------------------  IN: 480 mL / OUT: 0 mL / NET: 480 mL        PHYSICAL EXAM:  GENERAL: NAD, well-developed  HEAD:  Atraumatic, Normocephalic  EYES: EOMI, PERRLA, conjunctiva and sclera clear  NECK: Supple, No JVD  CHEST/LUNG: Clear to auscultation bilaterally; No wheeze  HEART: Regular rate and rhythm; No murmurs, rubs, or gallops  ABDOMEN: Soft, Nontender, Nondistended; Bowel sounds present  EXTREMITIES:  2+ Peripheral Pulses, No clubbing, cyanosis, or edema  PSYCH: AAOx3  NEUROLOGY: non-focal  SKIN: No rashes or lesions    LABS:                        11.5   4.48  )-----------( 235      ( 2020 06:30 )             35.7     07-22    141  |  103  |  22  ----------------------------<  103<H>  3.8   |  25  |  0.75    Ca    9.6      2020 06:30    TPro  6.7  /  Alb  3.9  /  TBili  0.3  /  DBili  x   /  AST  14  /  ALT  13  /  AlkPhos  88  07-21          Urinalysis Basic - ( 2020 13:29 )    Color: Light Yellow / Appearance: Clear / S.024 / pH: x  Gluc: x / Ketone: Negative  / Bili: Negative / Urobili: Negative   Blood: x / Protein: Negative / Nitrite: Negative   Leuk Esterase: Negative / RBC: 0 /hpf / WBC 2 /HPF   Sq Epi: x / Non Sq Epi: 0 /hpf / Bacteria: Negative        RADIOLOGY & ADDITIONAL TESTS:    Imaging Personally Reviewed:    Consultant(s) Notes Reviewed:      Care Discussed with Consultants/Other Providers: Patient is a 69y old  Female who presents with a chief complaint of left sided numbness (2020 10:29)      SUBJECTIVE / OVERNIGHT EVENTS:  Pt seen and examined. No acute events overnight. She c/o headache. Denies change in baseline right sided weakness.    MEDICATIONS  (STANDING):  apixaban 5 milliGRAM(s) Oral every 12 hours  atorvastatin 40 milliGRAM(s) Oral at bedtime  carvedilol 25 milliGRAM(s) Oral every 12 hours  fentaNYL   Patch  25 MICROgram(s)/Hr. 1 Patch Transdermal every 48 hours  furosemide    Tablet 40 milliGRAM(s) Oral daily  gabapentin 300 milliGRAM(s) Oral three times a day  lamoTRIgine 300 milliGRAM(s) Oral daily  levETIRAcetam 500 milliGRAM(s) Oral two times a day  levothyroxine 175 MICROGram(s) Oral daily  potassium chloride    Tablet ER 20 milliEquivalent(s) Oral daily    MEDICATIONS  (PRN):  acetaminophen   Tablet .. 650 milliGRAM(s) Oral every 6 hours PRN Mild Pain (1 - 3)  cyclobenzaprine 10 milliGRAM(s) Oral three times a day PRN Muscle Spasm  LORazepam     Tablet 2 milliGRAM(s) Oral three times a day PRN Anxiety  oxycodone    5 mG/acetaminophen 325 mG 2 Tablet(s) Oral every 6 hours PRN Moderate Pain (4 - 6)  oxycodone    5 mG/acetaminophen 325 mG 1 Tablet(s) Oral every 6 hours PRN Moderate Pain (4 - 6)      Vital Signs Last 24 Hrs  T(C): 36.4 (2020 07:02), Max: 36.7 (2020 11:19)  T(F): 97.6 (2020 07:02), Max: 98 (2020 11:19)  HR: 57 (2020 07:02) (57 - 70)  BP: 126/78 (2020 07:02) (101/66 - 139/79)  BP(mean): --  RR: 18 (2020 07:02) (18 - 18)  SpO2: 94% (2020 07:02) (94% - 98%)  CAPILLARY BLOOD GLUCOSE        I&O's Summary    2020 07:01  -  2020 07:00  --------------------------------------------------------  IN: 730 mL / OUT: 0 mL / NET: 730 mL    2020 07:01  -  2020 10:17  --------------------------------------------------------  IN: 480 mL / OUT: 0 mL / NET: 480 mL        PHYSICAL EXAM:  GENERAL: NAD  	HEAD:  Atraumatic, Normocephalic  	EYES: EOMI, PERRLA, conjunctiva and sclera clear  	ENT: Pharynx not erythematous  	PULMONARY: Clear to auscultation bilaterally; No wheeze  	CARDIOVASCULAR: Regular rate and rhythm; No murmurs, rubs, or gallops  	ABDOMEN: Soft, Nontender, Nondistended; Bowel sounds present  	EXTREMITIES:  2+ Peripheral Pulses, No clubbing, cyanosis, or edema  	MUSCULOSKELETAL: No calf tenderness  	PSYCH: AAOx3, normal affect  	NEUROLOGY: CN2-12 grossly intact, decreased sensation in left upper and lower extremities; 4/5 motor strength on left side; strength and sensation intact on right side; mild dysarthria, no facial droop    LABS:                        11.5   4.48  )-----------( 235      ( 2020 06:30 )             35.7     07-22    141  |  103  |  22  ----------------------------<  103<H>  3.8   |  25  |  0.75    Ca    9.6      2020 06:30    TPro  6.7  /  Alb  3.9  /  TBili  0.3  /  DBili  x   /  AST  14  /  ALT  13  /  AlkPhos  88  07-21          Urinalysis Basic - ( 2020 13:29 )    Color: Light Yellow / Appearance: Clear / S.024 / pH: x  Gluc: x / Ketone: Negative  / Bili: Negative / Urobili: Negative   Blood: x / Protein: Negative / Nitrite: Negative   Leuk Esterase: Negative / RBC: 0 /hpf / WBC 2 /HPF   Sq Epi: x / Non Sq Epi: 0 /hpf / Bacteria: Negative        RADIOLOGY & ADDITIONAL TESTS:    Imaging Personally Reviewed:  EEG 2020  No epileptiform pattern or seizure seen. Patient is a 69y old  Female who presents with a chief complaint of left sided numbness (2020 10:29)      SUBJECTIVE / OVERNIGHT EVENTS:  Pt seen and examined. No acute events overnight. She c/o headache. Denies change in baseline right sided weakness.    Tele : NSR, 50s- 70s    MEDICATIONS  (STANDING):  apixaban 5 milliGRAM(s) Oral every 12 hours  atorvastatin 40 milliGRAM(s) Oral at bedtime  carvedilol 25 milliGRAM(s) Oral every 12 hours  fentaNYL   Patch  25 MICROgram(s)/Hr. 1 Patch Transdermal every 48 hours  furosemide    Tablet 40 milliGRAM(s) Oral daily  gabapentin 300 milliGRAM(s) Oral three times a day  lamoTRIgine 300 milliGRAM(s) Oral daily  levETIRAcetam 500 milliGRAM(s) Oral two times a day  levothyroxine 175 MICROGram(s) Oral daily  potassium chloride    Tablet ER 20 milliEquivalent(s) Oral daily    MEDICATIONS  (PRN):  acetaminophen   Tablet .. 650 milliGRAM(s) Oral every 6 hours PRN Mild Pain (1 - 3)  cyclobenzaprine 10 milliGRAM(s) Oral three times a day PRN Muscle Spasm  LORazepam     Tablet 2 milliGRAM(s) Oral three times a day PRN Anxiety  oxycodone    5 mG/acetaminophen 325 mG 2 Tablet(s) Oral every 6 hours PRN Moderate Pain (4 - 6)  oxycodone    5 mG/acetaminophen 325 mG 1 Tablet(s) Oral every 6 hours PRN Moderate Pain (4 - 6)      Vital Signs Last 24 Hrs  T(C): 36.4 (2020 07:02), Max: 36.7 (2020 11:19)  T(F): 97.6 (2020 07:02), Max: 98 (2020 11:19)  HR: 57 (2020 07:02) (57 - 70)  BP: 126/78 (2020 07:02) (101/66 - 139/79)  BP(mean): --  RR: 18 (2020 07:02) (18 - 18)  SpO2: 94% (2020 07:02) (94% - 98%)  CAPILLARY BLOOD GLUCOSE        I&O's Summary    2020 07:01  -  2020 07:00  --------------------------------------------------------  IN: 730 mL / OUT: 0 mL / NET: 730 mL    2020 07:01  -  2020 10:17  --------------------------------------------------------  IN: 480 mL / OUT: 0 mL / NET: 480 mL        PHYSICAL EXAM:  GENERAL: NAD  	HEAD:  Atraumatic, Normocephalic  	EYES: EOMI, PERRLA, conjunctiva and sclera clear  	ENT: Pharynx not erythematous  	PULMONARY: Clear to auscultation bilaterally; No wheeze  	CARDIOVASCULAR: Regular rate and rhythm; No murmurs, rubs, or gallops  	ABDOMEN: Soft, Nontender, Nondistended; Bowel sounds present  	EXTREMITIES:  2+ Peripheral Pulses, No clubbing, cyanosis, or edema  	MUSCULOSKELETAL: No calf tenderness  	PSYCH: AAOx3, normal affect  	NEUROLOGY: CN2-12 grossly intact, decreased sensation in left upper and lower extremities; 4/5 motor strength on left side; strength and sensation intact on right side; mild dysarthria, no facial droop    LABS:                        11.5   4.48  )-----------( 235      ( 2020 06:30 )             35.7     07-22    141  |  103  |  22  ----------------------------<  103<H>  3.8   |  25  |  0.75    Ca    9.6      2020 06:30    TPro  6.7  /  Alb  3.9  /  TBili  0.3  /  DBili  x   /  AST  14  /  ALT  13  /  AlkPhos  88  07-21          Urinalysis Basic - ( 2020 13:29 )    Color: Light Yellow / Appearance: Clear / S.024 / pH: x  Gluc: x / Ketone: Negative  / Bili: Negative / Urobili: Negative   Blood: x / Protein: Negative / Nitrite: Negative   Leuk Esterase: Negative / RBC: 0 /hpf / WBC 2 /HPF   Sq Epi: x / Non Sq Epi: 0 /hpf / Bacteria: Negative        RADIOLOGY & ADDITIONAL TESTS:    Imaging Personally Reviewed:  EEG 2020  No epileptiform pattern or seizure seen.

## 2020-07-22 NOTE — CHART NOTE - NSCHARTNOTEFT_GEN_A_CORE
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 new seizures vs. orthostatics.     endocrine consulted for abnl TFTS       1) pt is on levothyroxine 175mcg with low TSH and elevated Free t4, would recommend to decrease 150mcg for now with labs in 4 weeks  no need to have TSH suppression in this 69 year old  please inform pts PCP of dose change and need to check labs in 4 weeks.  She may require further dose adjustment in the future as well base don TSH trend      d/w team and attg.      if formal consult is needed or further questions please call endocrine   [phone]

## 2020-07-22 NOTE — EEG REPORT - NS EEG TEXT BOX
[facility]   COMPREHENSIVE EPILEPSY CENTER   REPORT OF LONG-TERM VIDEO EEG     [facility]: [address], [address], [place] [address], Ph#: [phone]  [facility]: [address] [address] [facility] [place] [address], Ph#: [phone]  [facility]: [address] [place] [address], Ph#: [phone]    Patient Name: SUZIE ELY  Age and : 69y (54)  MRN #: [id number]  Location: [address]  Referring Physician: Felix Ott    Start Time/Date: x:x or 14:55 on 20  End Time/Date: 08:00 on 20  Duration: 17hrs    _____________________________________________________________  STUDY INFORMATION    EEG Recording Technique:  The patient underwent continuous Video-EEG monitoring, using Telemetry System hardware on the XLTek Digital System. EEG and video data were stored on a computer hard drive with important events saved in digital archive files. The material was reviewed by a physician (electroencephalographer / epileptologist) on a daily basis. Bob and seizure detection algorithms were utilized and reviewed. An EEG Technician attended to the patient, and was available throughout daytime work hours.  The epilepsy center neurologist was available in person or on call 24-hours per day.    EEG Placement and Labeling of Electrodes:  The EEG was performed utilizing 20 channel referential EEG connections (coronal over temporal over parasagittal montage) using all standard 10-20 electrode placements with EKG, with additional electrodes placed in the inferior temporal region using the modified 10-10 montage electrode placements for elective admissions, or if deemed necessary. Recording was at a sampling rate of 256 samples per second per channel. Time synchronized digital video recording was done simultaneously with EEG recording. A low light infrared camera was used for low light recording.     _____________________________________________________________  HISTORY    Patient is a 69y old  Female who presents with a chief complaint of left sided numbness (2020 10:29)      PERTINENT MEDICATION:  gabapentin 300 milliGRAM(s) Oral three times a day  lamoTRIgine 300 milliGRAM(s) Oral daily  levETIRAcetam 500 milliGRAM(s) Oral two times a day    _____________________________________________________________  STUDY INTERPRETATION    Findings: The background was continuous, spontaneously variable and reactive. During wakefulness, the posterior dominant rhythm consisted of symmetric, well-modulated 9 Hz activity, with amplitude to 30 uV, that attenuated to eye opening.      Background Slowing:  No generalized background slowing was present.    Focal Slowing:   None were present.    Sleep Background:  Drowsiness was characterized by fragmentation, attenuation, and slowing of the background activity.    Sleep was characterized by the presence of vertex waves, symmetric sleep spindles and K-complexes.    Other Non-Epileptiform Findings:  None were present.    Interictal Epileptiform Activity:   None were present.    Events:  Clinical events: None recorded.  Seizures: None recorded.    Activation Procedures:   Hyperventilation was not performed.    Photic stimulation was not performed.     Artifacts:  Intermittent myogenic and movement artifacts were noted.    ECG:  The heart rate on single channel ECG was predominantly between 60-70 BPM.    _____________________________________________________________  EEG SUMMARY/CLASSIFICATION    Normal EEG in the awake, drowsy and asleep states.    _____________________________________________________________  EEG IMPRESSION/CLINICAL CORRELATE    Normal EEG study.  No epileptiform pattern or seizure seen.  _____________________________________________________________    Felipe Pete MD [facility]-5  Epilepsy Fellow [facility]   COMPREHENSIVE EPILEPSY CENTER   REPORT OF LONG-TERM VIDEO EEG     [facility]: [address], [address], [place] [address], Ph#: [phone]  [facility]: [address] [facility] [place] [address], Ph#: [phone]  [facility]: [address] [place] [address], Ph#: [phone]    Patient Name: SUZIE ELY  Age and : 69y (54)  MRN #: [id number]  Location: [address]  Referring Physician: Felix Ott    Start Time/Date: 14:55 on 20  End Time/Date: 08:00 on 20  Duration: 17hrs    _____________________________________________________________  STUDY INFORMATION    EEG Recording Technique:  The patient underwent continuous Video-EEG monitoring, using Telemetry System hardware on the XLTek Digital System. EEG and video data were stored on a computer hard drive with important events saved in digital archive files. The material was reviewed by a physician (electroencephalographer / epileptologist) on a daily basis. Bob and seizure detection algorithms were utilized and reviewed. An EEG Technician attended to the patient, and was available throughout daytime work hours.  The epilepsy center neurologist was available in person or on call 24-hours per day.    EEG Placement and Labeling of Electrodes:  The EEG was performed utilizing 20 channel referential EEG connections (coronal over temporal over parasagittal montage) using all standard 10-20 electrode placements with EKG, with additional electrodes placed in the inferior temporal region using the modified 10-10 montage electrode placements for elective admissions, or if deemed necessary. Recording was at a sampling rate of 256 samples per second per channel. Time synchronized digital video recording was done simultaneously with EEG recording. A low light infrared camera was used for low light recording.     _____________________________________________________________  HISTORY    Patient is a 69y old  Female who presents with a chief complaint of left sided numbness (2020 10:29)      PERTINENT MEDICATION:  gabapentin 300 milliGRAM(s) Oral three times a day  lamoTRIgine 300 milliGRAM(s) Oral daily  levETIRAcetam 500 milliGRAM(s) Oral two times a day    _____________________________________________________________  STUDY INTERPRETATION    Findings: The background was continuous, spontaneously variable and reactive. During wakefulness, the posterior dominant rhythm consisted of symmetric, well-modulated 8-9 Hz activity, with amplitude to 30 uV, that attenuated to eye opening.      Background Slowing:  No generalized background slowing was present.    Focal Slowing:   None were present.    Sleep Background:  Drowsiness was characterized by fragmentation, attenuation, and slowing of the background activity.    Sleep was characterized by the presence of vertex waves, symmetric sleep spindles and K-complexes.    Other Non-Epileptiform Findings:  None were present.    Interictal Epileptiform Activity:   None were present.    Events:  Clinical events: None recorded.  Seizures: None recorded.    Activation Procedures:   Hyperventilation was not performed.    Photic stimulation was not performed.     Artifacts:  Intermittent myogenic and movement artifacts were noted.    ECG:  The heart rate on single channel ECG was predominantly between 60-70 BPM.    _____________________________________________________________  EEG SUMMARY/CLASSIFICATION    Normal EEG in the awake, drowsy and asleep states.    _____________________________________________________________  EEG IMPRESSION/CLINICAL CORRELATE    Normal EEG study.  No epileptiform pattern or seizure seen.    _____________________________________________________________    Felipe Pete MD, PGY-5  Epilepsy Fellow     Raissa Loza MD  Attending Physician, [facility]

## 2020-07-23 LAB
ANION GAP SERPL CALC-SCNC: 10 MMOL/L — SIGNIFICANT CHANGE UP (ref 5–17)
BUN SERPL-MCNC: 21 MG/DL — SIGNIFICANT CHANGE UP (ref 7–23)
CALCIUM SERPL-MCNC: 9.7 MG/DL — SIGNIFICANT CHANGE UP (ref 8.4–10.5)
CHLORIDE SERPL-SCNC: 102 MMOL/L — SIGNIFICANT CHANGE UP (ref 96–108)
CO2 SERPL-SCNC: 28 MMOL/L — SIGNIFICANT CHANGE UP (ref 22–31)
CREAT SERPL-MCNC: 0.8 MG/DL — SIGNIFICANT CHANGE UP (ref 0.5–1.3)
GLUCOSE SERPL-MCNC: 97 MG/DL — SIGNIFICANT CHANGE UP (ref 70–99)
HCT VFR BLD CALC: 36.2 % — SIGNIFICANT CHANGE UP (ref 34.5–45)
HGB BLD-MCNC: 11.5 G/DL — SIGNIFICANT CHANGE UP (ref 11.5–15.5)
MCHC RBC-ENTMCNC: 31 PG — SIGNIFICANT CHANGE UP (ref 27–34)
MCHC RBC-ENTMCNC: 31.8 GM/DL — LOW (ref 32–36)
MCV RBC AUTO: 97.6 FL — SIGNIFICANT CHANGE UP (ref 80–100)
NRBC # BLD: 0 /100 WBCS — SIGNIFICANT CHANGE UP (ref 0–0)
PLATELET # BLD AUTO: 235 K/UL — SIGNIFICANT CHANGE UP (ref 150–400)
POTASSIUM SERPL-MCNC: 3.7 MMOL/L — SIGNIFICANT CHANGE UP (ref 3.5–5.3)
POTASSIUM SERPL-SCNC: 3.7 MMOL/L — SIGNIFICANT CHANGE UP (ref 3.5–5.3)
RBC # BLD: 3.71 M/UL — LOW (ref 3.8–5.2)
RBC # FLD: 13.5 % — SIGNIFICANT CHANGE UP (ref 10.3–14.5)
SODIUM SERPL-SCNC: 140 MMOL/L — SIGNIFICANT CHANGE UP (ref 135–145)
WBC # BLD: 4.39 K/UL — SIGNIFICANT CHANGE UP (ref 3.8–10.5)
WBC # FLD AUTO: 4.39 K/UL — SIGNIFICANT CHANGE UP (ref 3.8–10.5)

## 2020-07-23 PROCEDURE — 99233 SBSQ HOSP IP/OBS HIGH 50: CPT

## 2020-07-23 PROCEDURE — 93306 TTE W/DOPPLER COMPLETE: CPT | Mod: 26

## 2020-07-23 PROCEDURE — 93321 DOPPLER ECHO F-UP/LMTD STD: CPT | Mod: 26,59

## 2020-07-23 PROCEDURE — 93308 TTE F-UP OR LMTD: CPT | Mod: 26,76,XS

## 2020-07-23 RX ADMIN — OXYCODONE AND ACETAMINOPHEN 2 TABLET(S): 5; 325 TABLET ORAL at 05:40

## 2020-07-23 RX ADMIN — Medication 2 MILLIGRAM(S): at 15:44

## 2020-07-23 RX ADMIN — GABAPENTIN 300 MILLIGRAM(S): 400 CAPSULE ORAL at 22:09

## 2020-07-23 RX ADMIN — CARVEDILOL PHOSPHATE 25 MILLIGRAM(S): 80 CAPSULE, EXTENDED RELEASE ORAL at 17:52

## 2020-07-23 RX ADMIN — Medication 2 MILLIGRAM(S): at 05:40

## 2020-07-23 RX ADMIN — ATORVASTATIN CALCIUM 40 MILLIGRAM(S): 80 TABLET, FILM COATED ORAL at 22:09

## 2020-07-23 RX ADMIN — GABAPENTIN 300 MILLIGRAM(S): 400 CAPSULE ORAL at 05:33

## 2020-07-23 RX ADMIN — LAMOTRIGINE 300 MILLIGRAM(S): 25 TABLET, ORALLY DISINTEGRATING ORAL at 12:21

## 2020-07-23 RX ADMIN — Medication 20 MILLIEQUIVALENT(S): at 12:21

## 2020-07-23 RX ADMIN — GABAPENTIN 300 MILLIGRAM(S): 400 CAPSULE ORAL at 13:49

## 2020-07-23 RX ADMIN — Medication 40 MILLIGRAM(S): at 05:33

## 2020-07-23 RX ADMIN — Medication 150 MICROGRAM(S): at 05:32

## 2020-07-23 RX ADMIN — CARVEDILOL PHOSPHATE 25 MILLIGRAM(S): 80 CAPSULE, EXTENDED RELEASE ORAL at 05:33

## 2020-07-23 RX ADMIN — LEVETIRACETAM 500 MILLIGRAM(S): 250 TABLET, FILM COATED ORAL at 05:33

## 2020-07-23 RX ADMIN — OXYCODONE AND ACETAMINOPHEN 2 TABLET(S): 5; 325 TABLET ORAL at 16:00

## 2020-07-23 RX ADMIN — OXYCODONE AND ACETAMINOPHEN 2 TABLET(S): 5; 325 TABLET ORAL at 15:44

## 2020-07-23 RX ADMIN — APIXABAN 5 MILLIGRAM(S): 2.5 TABLET, FILM COATED ORAL at 05:34

## 2020-07-23 RX ADMIN — LEVETIRACETAM 500 MILLIGRAM(S): 250 TABLET, FILM COATED ORAL at 17:52

## 2020-07-23 RX ADMIN — APIXABAN 5 MILLIGRAM(S): 2.5 TABLET, FILM COATED ORAL at 17:52

## 2020-07-23 RX ADMIN — FENTANYL CITRATE 1 PATCH: 50 INJECTION INTRAVENOUS at 07:10

## 2020-07-23 NOTE — PROGRESS NOTE ADULT - PROBLEM SELECTOR PLAN 8
- AVR (tissue) and ascending aorta replacement with Dr. Alvares on 2/19  - has been unable to f/up out pt on account of covid pandemic

## 2020-07-23 NOTE — CHART NOTE - NSCHARTNOTEFT_GEN_A_CORE
Upon reviewing the Echo, discussed with Dr. Ott   - Cards consulted   - Echo : Echo : Endocardium not well visualized. Grossly mild segmental  left ventricular dysfunction (the distal septum and apex  appear hypokinetic). Interpretation based on poor acoustic  windows/poor resolution. Consider repeat echocardiogram  with echo contrast if clinically indicated. Septal motion  consistent with cardiac surgery.   - Will start to get old echo from [place] cardiologist to compare

## 2020-07-23 NOTE — PROGRESS NOTE ADULT - SUBJECTIVE AND OBJECTIVE BOX
Patient is a 69y old  Female who presents with a chief complaint of left sided numbness (22 Jul 2020 10:16)      SUBJECTIVE / OVERNIGHT EVENTS:    MEDICATIONS  (STANDING):  apixaban 5 milliGRAM(s) Oral every 12 hours  atorvastatin 40 milliGRAM(s) Oral at bedtime  carvedilol 25 milliGRAM(s) Oral every 12 hours  fentaNYL   Patch  25 MICROgram(s)/Hr. 1 Patch Transdermal every 48 hours  furosemide    Tablet 40 milliGRAM(s) Oral daily  gabapentin 300 milliGRAM(s) Oral three times a day  lamoTRIgine 300 milliGRAM(s) Oral daily  levETIRAcetam 500 milliGRAM(s) Oral two times a day  levothyroxine 150 MICROGram(s) Oral daily  potassium chloride    Tablet ER 20 milliEquivalent(s) Oral daily    MEDICATIONS  (PRN):  acetaminophen   Tablet .. 650 milliGRAM(s) Oral every 6 hours PRN Mild Pain (1 - 3)  cyclobenzaprine 10 milliGRAM(s) Oral three times a day PRN Muscle Spasm  LORazepam     Tablet 2 milliGRAM(s) Oral three times a day PRN Anxiety  oxycodone    5 mG/acetaminophen 325 mG 2 Tablet(s) Oral every 6 hours PRN Moderate Pain (4 - 6)  oxycodone    5 mG/acetaminophen 325 mG 1 Tablet(s) Oral every 6 hours PRN Moderate Pain (4 - 6)      Vital Signs Last 24 Hrs  T(C): 36.4 (23 Jul 2020 11:00), Max: 36.9 (22 Jul 2020 23:35)  T(F): 97.5 (23 Jul 2020 11:00), Max: 98.5 (22 Jul 2020 23:35)  HR: 68 (23 Jul 2020 11:00) (59 - 72)  BP: 124/75 (23 Jul 2020 11:00) (101/65 - 157/86)  BP(mean): --  RR: 18 (23 Jul 2020 11:00) (18 - 18)  SpO2: 93% (23 Jul 2020 11:00) (93% - 96%)  CAPILLARY BLOOD GLUCOSE        I&O's Summary    22 Jul 2020 07:01  -  23 Jul 2020 07:00  --------------------------------------------------------  IN: 1200 mL / OUT: 0 mL / NET: 1200 mL    23 Jul 2020 07:01  -  23 Jul 2020 11:45  --------------------------------------------------------  IN: 480 mL / OUT: 0 mL / NET: 480 mL        PHYSICAL EXAM:  GENERAL: NAD, well-developed  HEAD:  Atraumatic, Normocephalic  EYES: EOMI, PERRLA, conjunctiva and sclera clear  NECK: Supple, No JVD  CHEST/LUNG: Clear to auscultation bilaterally; No wheeze  HEART: Regular rate and rhythm; No murmurs, rubs, or gallops  ABDOMEN: Soft, Nontender, Nondistended; Bowel sounds present  EXTREMITIES:  2+ Peripheral Pulses, No clubbing, cyanosis, or edema  PSYCH: AAOx3  NEUROLOGY: non-focal  SKIN: No rashes or lesions    LABS:                        11.5   4.39  )-----------( 235      ( 23 Jul 2020 06:26 )             36.2     07-23    140  |  102  |  21  ----------------------------<  97  3.7   |  28  |  0.80    Ca    9.7      23 Jul 2020 06:26                RADIOLOGY & ADDITIONAL TESTS:    Imaging Personally Reviewed:    Consultant(s) Notes Reviewed:      Care Discussed with Consultants/Other Providers: Patient is a 69y old  Female who presents with a chief complaint of left sided numbness (22 Jul 2020 10:16)      SUBJECTIVE / OVERNIGHT EVENTS: Pt seen and examined. c/o double vision last night which she states lasted for a few hours and resolved. c/t c/o headache. Stroke team was informed ; reccs to c/t current mgt. Pt denies acute c/o this am; denies double vision at this time. Right sided weakness is unchanged.    MEDICATIONS  (STANDING):  apixaban 5 milliGRAM(s) Oral every 12 hours  atorvastatin 40 milliGRAM(s) Oral at bedtime  carvedilol 25 milliGRAM(s) Oral every 12 hours  fentaNYL   Patch  25 MICROgram(s)/Hr. 1 Patch Transdermal every 48 hours  furosemide    Tablet 40 milliGRAM(s) Oral daily  gabapentin 300 milliGRAM(s) Oral three times a day  lamoTRIgine 300 milliGRAM(s) Oral daily  levETIRAcetam 500 milliGRAM(s) Oral two times a day  levothyroxine 150 MICROGram(s) Oral daily  potassium chloride    Tablet ER 20 milliEquivalent(s) Oral daily    MEDICATIONS  (PRN):  acetaminophen   Tablet .. 650 milliGRAM(s) Oral every 6 hours PRN Mild Pain (1 - 3)  cyclobenzaprine 10 milliGRAM(s) Oral three times a day PRN Muscle Spasm  LORazepam     Tablet 2 milliGRAM(s) Oral three times a day PRN Anxiety  oxycodone    5 mG/acetaminophen 325 mG 2 Tablet(s) Oral every 6 hours PRN Moderate Pain (4 - 6)  oxycodone    5 mG/acetaminophen 325 mG 1 Tablet(s) Oral every 6 hours PRN Moderate Pain (4 - 6)      Vital Signs Last 24 Hrs  T(C): 36.4 (23 Jul 2020 11:00), Max: 36.9 (22 Jul 2020 23:35)  T(F): 97.5 (23 Jul 2020 11:00), Max: 98.5 (22 Jul 2020 23:35)  HR: 68 (23 Jul 2020 11:00) (59 - 72)  BP: 124/75 (23 Jul 2020 11:00) (101/65 - 157/86)  BP(mean): --  RR: 18 (23 Jul 2020 11:00) (18 - 18)  SpO2: 93% (23 Jul 2020 11:00) (93% - 96%)  CAPILLARY BLOOD GLUCOSE        I&O's Summary    22 Jul 2020 07:01  -  23 Jul 2020 07:00  --------------------------------------------------------  IN: 1200 mL / OUT: 0 mL / NET: 1200 mL    23 Jul 2020 07:01  -  23 Jul 2020 11:45  --------------------------------------------------------  IN: 480 mL / OUT: 0 mL / NET: 480 mL        PHYSICAL EXAM:  GENERAL: NAD  	HEAD:  Atraumatic, Normocephalic  	EYES: EOMI, PERRLA, conjunctiva and sclera clear  	ENT: Pharynx not erythematous  	PULMONARY: Clear to auscultation bilaterally; No wheeze  	CARDIOVASCULAR: Regular rate and rhythm; No murmurs, rubs, or gallops  	ABDOMEN: Soft, Nontender, Nondistended; Bowel sounds present  	EXTREMITIES:  2+ Peripheral Pulses, No clubbing, cyanosis, or edema  	MUSCULOSKELETAL: No calf tenderness  	PSYCH: AAOx3, normal affect  	NEUROLOGY: CN2-12 grossly intact, decreased sensation in left upper and lower extremities; 4/5 motor strength on left side; strength and sensation intact on right side; no dysarthria, no facial droop    LABS:                        11.5   4.39  )-----------( 235      ( 23 Jul 2020 06:26 )             36.2     07-23    140  |  102  |  21  ----------------------------<  97  3.7   |  28  |  0.80    Ca    9.7      23 Jul 2020 06:26

## 2020-07-23 NOTE — CHART NOTE - NSCHARTNOTEFT_GEN_A_CORE
Reviewed echo with Dr. Carrillo. Endocardium not visualized well enough to assess for WMA.   Please repeat echo with echo contrast for better visualization.     Jose Miguel Ballesteros MD  Cardiology Fellow   [phone]    Please check [email or web address] for cardiology service schedule and contact information.

## 2020-07-24 PROCEDURE — 99233 SBSQ HOSP IP/OBS HIGH 50: CPT

## 2020-07-24 RX ADMIN — CARVEDILOL PHOSPHATE 25 MILLIGRAM(S): 80 CAPSULE, EXTENDED RELEASE ORAL at 18:13

## 2020-07-24 RX ADMIN — Medication 1 DROP(S): at 18:13

## 2020-07-24 RX ADMIN — FENTANYL CITRATE 1 PATCH: 50 INJECTION INTRAVENOUS at 07:00

## 2020-07-24 RX ADMIN — Medication 1 DROP(S): at 21:55

## 2020-07-24 RX ADMIN — LAMOTRIGINE 300 MILLIGRAM(S): 25 TABLET, ORALLY DISINTEGRATING ORAL at 11:58

## 2020-07-24 RX ADMIN — APIXABAN 5 MILLIGRAM(S): 2.5 TABLET, FILM COATED ORAL at 05:28

## 2020-07-24 RX ADMIN — FENTANYL CITRATE 1 PATCH: 50 INJECTION INTRAVENOUS at 19:30

## 2020-07-24 RX ADMIN — OXYCODONE AND ACETAMINOPHEN 2 TABLET(S): 5; 325 TABLET ORAL at 05:27

## 2020-07-24 RX ADMIN — OXYCODONE AND ACETAMINOPHEN 1 TABLET(S): 5; 325 TABLET ORAL at 18:16

## 2020-07-24 RX ADMIN — OXYCODONE AND ACETAMINOPHEN 1 TABLET(S): 5; 325 TABLET ORAL at 11:57

## 2020-07-24 RX ADMIN — LEVETIRACETAM 500 MILLIGRAM(S): 250 TABLET, FILM COATED ORAL at 18:13

## 2020-07-24 RX ADMIN — FENTANYL CITRATE 1 PATCH: 50 INJECTION INTRAVENOUS at 22:03

## 2020-07-24 RX ADMIN — Medication 1 DROP(S): at 14:31

## 2020-07-24 RX ADMIN — Medication 2 MILLIGRAM(S): at 11:51

## 2020-07-24 RX ADMIN — Medication 20 MILLIEQUIVALENT(S): at 11:50

## 2020-07-24 RX ADMIN — GABAPENTIN 300 MILLIGRAM(S): 400 CAPSULE ORAL at 15:07

## 2020-07-24 RX ADMIN — GABAPENTIN 300 MILLIGRAM(S): 400 CAPSULE ORAL at 05:28

## 2020-07-24 RX ADMIN — ATORVASTATIN CALCIUM 40 MILLIGRAM(S): 80 TABLET, FILM COATED ORAL at 21:55

## 2020-07-24 RX ADMIN — Medication 150 MICROGRAM(S): at 05:28

## 2020-07-24 RX ADMIN — CARVEDILOL PHOSPHATE 25 MILLIGRAM(S): 80 CAPSULE, EXTENDED RELEASE ORAL at 05:29

## 2020-07-24 RX ADMIN — Medication 40 MILLIGRAM(S): at 05:28

## 2020-07-24 RX ADMIN — GABAPENTIN 300 MILLIGRAM(S): 400 CAPSULE ORAL at 21:55

## 2020-07-24 RX ADMIN — APIXABAN 5 MILLIGRAM(S): 2.5 TABLET, FILM COATED ORAL at 18:13

## 2020-07-24 RX ADMIN — Medication 2 MILLIGRAM(S): at 05:27

## 2020-07-24 RX ADMIN — FENTANYL CITRATE 1 PATCH: 50 INJECTION INTRAVENOUS at 00:27

## 2020-07-24 RX ADMIN — Medication 2 MILLIGRAM(S): at 18:13

## 2020-07-24 RX ADMIN — OXYCODONE AND ACETAMINOPHEN 2 TABLET(S): 5; 325 TABLET ORAL at 12:45

## 2020-07-24 RX ADMIN — LEVETIRACETAM 500 MILLIGRAM(S): 250 TABLET, FILM COATED ORAL at 05:28

## 2020-07-24 NOTE — CHART NOTE - NSCHARTNOTEFT_GEN_A_CORE
MRI reviewed in addition to CT head, CTA, TTE, and EEG  Patient does not appear to have a new neurovascular event or seizure, and no neurological contraindication to discharge.   Continue Lesliequmagan.    Katia Adams  PGY4 Neurology

## 2020-07-24 NOTE — CHART NOTE - NSCHARTNOTEFT_GEN_A_CORE
Pending repeat limited ECHO with ultrasound enhancing agent Definity in order to visualize the endocardium and make an accurate estimation of you LVEF and if there is any WMA. Per my review of the ECHO, the images are of such poor resolution that no estimation or assessment can be made with certainty. I suspect that her heart is in fact completely unremarkable with no pathology.    Lorena Carrillo MD, MPH, MANNY, RPVI, FACC  Cardiovascular Specialist   [facility]  c: [phone] (text preferred and/or call)  e: [email or web address]

## 2020-07-24 NOTE — PROGRESS NOTE ADULT - SUBJECTIVE AND OBJECTIVE BOX
Patient is a 69y old  Female who presents with a chief complaint of left sided numbness (23 Jul 2020 11:45)      SUBJECTIVE / OVERNIGHT EVENTS: pt reports burning sensation and dry eyes, headache. Also has left sided heaviness since the stroke. No other complaints.     MEDICATIONS  (STANDING):  apixaban 5 milliGRAM(s) Oral every 12 hours  atorvastatin 40 milliGRAM(s) Oral at bedtime  carvedilol 25 milliGRAM(s) Oral every 12 hours  fentaNYL   Patch  25 MICROgram(s)/Hr. 1 Patch Transdermal every 48 hours  furosemide    Tablet 40 milliGRAM(s) Oral daily  gabapentin 300 milliGRAM(s) Oral three times a day  lamoTRIgine 300 milliGRAM(s) Oral daily  levETIRAcetam 500 milliGRAM(s) Oral two times a day  levothyroxine 150 MICROGram(s) Oral daily  potassium chloride    Tablet ER 20 milliEquivalent(s) Oral daily    MEDICATIONS  (PRN):  acetaminophen   Tablet .. 650 milliGRAM(s) Oral every 6 hours PRN Mild Pain (1 - 3)  cyclobenzaprine 10 milliGRAM(s) Oral three times a day PRN Muscle Spasm  LORazepam     Tablet 2 milliGRAM(s) Oral three times a day PRN Anxiety  oxycodone    5 mG/acetaminophen 325 mG 2 Tablet(s) Oral every 6 hours PRN Moderate Pain (4 - 6)  oxycodone    5 mG/acetaminophen 325 mG 1 Tablet(s) Oral every 6 hours PRN Moderate Pain (4 - 6)      Vital Signs Last 24 Hrs  T(C): 36.4 (24 Jul 2020 07:51), Max: 36.6 (23 Jul 2020 18:49)  T(F): 97.5 (24 Jul 2020 07:51), Max: 97.9 (23 Jul 2020 18:49)  HR: 59 (24 Jul 2020 07:51) (59 - 72)  BP: 104/69 (24 Jul 2020 07:51) (102/69 - 108/75)  BP(mean): --  RR: 18 (24 Jul 2020 07:51) (18 - 18)  SpO2: 96% (24 Jul 2020 07:51) (93% - 97%)  CAPILLARY BLOOD GLUCOSE        I&O's Summary    23 Jul 2020 07:01  -  24 Jul 2020 07:00  --------------------------------------------------------  IN: 990 mL / OUT: 0 mL / NET: 990 mL        PHYSICAL EXAM:  GENERAL: NAD  EYES:conjunctiva and sclera clear  NECK: Supple, No JVD  CHEST/LUNG: Clear to auscultation bilaterally; No wheeze  HEART: +s1/S2, reg   ABDOMEN: Soft, Nontender, Nondistended  EXTREMITIES: no LE edema   PSYCH: AAOx3      LABS:                        11.5   4.39  )-----------( 235      ( 23 Jul 2020 06:26 )             36.2     07-23    140  |  102  |  21  ----------------------------<  97  3.7   |  28  |  0.80    Ca    9.7      23 Jul 2020 06:26

## 2020-07-25 LAB
CK MB CFR SERPL CALC: 1 NG/ML — SIGNIFICANT CHANGE UP (ref 0–3.8)
CK SERPL-CCNC: 29 U/L — SIGNIFICANT CHANGE UP (ref 25–170)
TROPONIN T, HIGH SENSITIVITY RESULT: <6 NG/L — SIGNIFICANT CHANGE UP (ref 0–51)

## 2020-07-25 PROCEDURE — 70450 CT HEAD/BRAIN W/O DYE: CPT | Mod: 26

## 2020-07-25 PROCEDURE — 99233 SBSQ HOSP IP/OBS HIGH 50: CPT

## 2020-07-25 PROCEDURE — 99232 SBSQ HOSP IP/OBS MODERATE 35: CPT

## 2020-07-25 PROCEDURE — 93010 ELECTROCARDIOGRAM REPORT: CPT

## 2020-07-25 RX ORDER — ACETAMINOPHEN 500 MG
975 TABLET ORAL ONCE
Refills: 0 | Status: COMPLETED | OUTPATIENT
Start: 2020-07-25 | End: 2020-07-25

## 2020-07-25 RX ORDER — ONDANSETRON 8 MG/1
8 TABLET, FILM COATED ORAL ONCE
Refills: 0 | Status: COMPLETED | OUTPATIENT
Start: 2020-07-25 | End: 2020-07-25

## 2020-07-25 RX ORDER — ONDANSETRON 8 MG/1
4 TABLET, FILM COATED ORAL ONCE
Refills: 0 | Status: COMPLETED | OUTPATIENT
Start: 2020-07-25 | End: 2020-07-25

## 2020-07-25 RX ORDER — ONDANSETRON 8 MG/1
4 TABLET, FILM COATED ORAL ONCE
Refills: 0 | Status: DISCONTINUED | OUTPATIENT
Start: 2020-07-25 | End: 2020-07-25

## 2020-07-25 RX ORDER — ONDANSETRON 8 MG/1
4 TABLET, FILM COATED ORAL EVERY 8 HOURS
Refills: 0 | Status: DISCONTINUED | OUTPATIENT
Start: 2020-07-25 | End: 2020-07-26

## 2020-07-25 RX ADMIN — GABAPENTIN 300 MILLIGRAM(S): 400 CAPSULE ORAL at 15:24

## 2020-07-25 RX ADMIN — FENTANYL CITRATE 1 PATCH: 50 INJECTION INTRAVENOUS at 07:31

## 2020-07-25 RX ADMIN — ONDANSETRON 4 MILLIGRAM(S): 8 TABLET, FILM COATED ORAL at 22:23

## 2020-07-25 RX ADMIN — ONDANSETRON 4 MILLIGRAM(S): 8 TABLET, FILM COATED ORAL at 18:12

## 2020-07-25 RX ADMIN — ATORVASTATIN CALCIUM 40 MILLIGRAM(S): 80 TABLET, FILM COATED ORAL at 22:02

## 2020-07-25 RX ADMIN — ONDANSETRON 8 MILLIGRAM(S): 8 TABLET, FILM COATED ORAL at 10:41

## 2020-07-25 RX ADMIN — LEVETIRACETAM 500 MILLIGRAM(S): 250 TABLET, FILM COATED ORAL at 18:11

## 2020-07-25 RX ADMIN — CARVEDILOL PHOSPHATE 25 MILLIGRAM(S): 80 CAPSULE, EXTENDED RELEASE ORAL at 18:12

## 2020-07-25 RX ADMIN — Medication 20 MILLIEQUIVALENT(S): at 15:25

## 2020-07-25 RX ADMIN — Medication 650 MILLIGRAM(S): at 16:30

## 2020-07-25 RX ADMIN — OXYCODONE AND ACETAMINOPHEN 2 TABLET(S): 5; 325 TABLET ORAL at 06:30

## 2020-07-25 RX ADMIN — Medication 40 MILLIGRAM(S): at 06:30

## 2020-07-25 RX ADMIN — FENTANYL CITRATE 1 PATCH: 50 INJECTION INTRAVENOUS at 18:11

## 2020-07-25 RX ADMIN — Medication 975 MILLIGRAM(S): at 22:52

## 2020-07-25 RX ADMIN — LAMOTRIGINE 300 MILLIGRAM(S): 25 TABLET, ORALLY DISINTEGRATING ORAL at 15:25

## 2020-07-25 RX ADMIN — CARVEDILOL PHOSPHATE 25 MILLIGRAM(S): 80 CAPSULE, EXTENDED RELEASE ORAL at 06:30

## 2020-07-25 RX ADMIN — Medication 975 MILLIGRAM(S): at 22:22

## 2020-07-25 RX ADMIN — LEVETIRACETAM 500 MILLIGRAM(S): 250 TABLET, FILM COATED ORAL at 06:30

## 2020-07-25 RX ADMIN — APIXABAN 5 MILLIGRAM(S): 2.5 TABLET, FILM COATED ORAL at 18:11

## 2020-07-25 RX ADMIN — GABAPENTIN 300 MILLIGRAM(S): 400 CAPSULE ORAL at 06:30

## 2020-07-25 RX ADMIN — Medication 150 MICROGRAM(S): at 06:30

## 2020-07-25 RX ADMIN — APIXABAN 5 MILLIGRAM(S): 2.5 TABLET, FILM COATED ORAL at 06:31

## 2020-07-25 RX ADMIN — OXYCODONE AND ACETAMINOPHEN 2 TABLET(S): 5; 325 TABLET ORAL at 00:55

## 2020-07-25 RX ADMIN — FENTANYL CITRATE 1 PATCH: 50 INJECTION INTRAVENOUS at 00:25

## 2020-07-25 RX ADMIN — Medication 1 DROP(S): at 15:25

## 2020-07-25 RX ADMIN — OXYCODONE AND ACETAMINOPHEN 2 TABLET(S): 5; 325 TABLET ORAL at 00:25

## 2020-07-25 RX ADMIN — Medication 1 DROP(S): at 22:01

## 2020-07-25 RX ADMIN — Medication 1 DROP(S): at 06:30

## 2020-07-25 RX ADMIN — Medication 2 MILLIGRAM(S): at 00:24

## 2020-07-25 RX ADMIN — GABAPENTIN 300 MILLIGRAM(S): 400 CAPSULE ORAL at 22:01

## 2020-07-25 RX ADMIN — Medication 2 MILLIGRAM(S): at 06:29

## 2020-07-25 RX ADMIN — OXYCODONE AND ACETAMINOPHEN 2 TABLET(S): 5; 325 TABLET ORAL at 07:00

## 2020-07-25 RX ADMIN — Medication 650 MILLIGRAM(S): at 15:42

## 2020-07-25 NOTE — PROVIDER CONTACT NOTE (OTHER) - SITUATION
Pt lethargic, diaphoretic, nauseous, mild headache. Pt nauseous this AM, 1x zofran given, no relief. Pt diaphoretic. Increasingly lethargic. Endorses mild headache.

## 2020-07-25 NOTE — PROVIDER CONTACT NOTE (OTHER) - BACKGROUND
P/w episodes of confusion and trouble with balance. Residual L sided weakness from previous stroke 2/2020.

## 2020-07-25 NOTE — PROVIDER CONTACT NOTE (OTHER) - RECOMMENDATIONS
As per NP no action required at this time, will see pt at bedside and stroke team to consult.
Notify Provider
Provider to bedside, EKG, labs, CT head
assess and treat

## 2020-07-25 NOTE — CHART NOTE - NSCHARTNOTEFT_GEN_A_CORE
CC: Headache      HPI:  Called by RN to relate pt having a headache.  Pt seen and evaluated at bedside, she relates having a headache at the top of her cranium and transmitted to behind her eyes, 8/10.  Pt denied having dizziness, double vision or visual changes. She does feel nauseous, for which she is given Zofran.  She denied having cp, sob, abdominal pain, or palpitations.  At bedside, she is in NAD, alert and oriented, speech fluent        ROS:  CONSTITUTIONAL:  No fever, chills, rigors  CARDIOVASCULAR:  No chest pain or palpitations  RESPIRATORY:   No SOB, cough, dyspnea on exertion.  No wheezing  GASTROINTESTINAL:  No abd pain, + nausea  EXTREMITIES:  No swelling or joint pain  NEUROLOGIC:  + HA,  no visual disturbances          PAST MEDICAL & SURGICAL HISTORY:  Transient cerebral ischemia  Atrial fibrillation  Cerebrovascular accident (CVA)  H/O aortic aneurysm  Moderate aortic stenosis  Subacute systemic lupus erythematosus  Cataract  Hypothyroid  Hip dislocation, left  Fibromyalgia  Uterine cancer  Spinal stenosis of lumbar region  Bipolar depression  HLD (hyperlipidemia)  HTN (hypertension)  DVT prophylaxis  Pain management  Paroxysmal atrial fibrillation  Seizure  Encephalopathy acute  H/O tubal ligation  History of cataract surgery  H/O total hysterectomy  H/O:   History of hip surgery  WEAKNESS        Vital Signs Last 24 Hrs  T(C): 36.3 (2020 19:54), Max: 36.6 (2020 08:25)  T(F): 97.3 (2020 19:54), Max: 97.9 (2020 08:25)  HR: 67 (2020 19:54) (56 - 78)  BP: 122/81 (2020 19:54) (107/70 - 132/79)  BP(mean): --  RR: 17 (2020 19:54) (16 - 18)  SpO2: 96% (2020 19:54) (96% - 97%)      Physical Exam:  General: WN/WD NAD, AOx3, nontoxic appearing  Head:  NC/AT  Eyes: PERRLA, EOMI, visual acuity grossly intact  CV: RRR, S1S2; grade 2/6 soft LINDSEY heard at 2nd LICS  Respiratory: CTA B/L, nonlabored  Abdominal: (+) bowel sounds x4. Soft, non tender, non distended  MSK: No BLLE edema, + peripheral pulses, FROM all 4 extremity                        Skin: (+) warm, dry   Psych: Appropriate affect       Labs:      Radiology:  < from: CT Head No Cont (20 @ 17:12) >  EXAM:  CT BRAIN                       PROCEDURE DATE:  2020    INTERPRETATION:  INDICATIONS:  lethargic, transient AMS  .TECHNIQUE:  Serial axial images were obtained from the skull base to the vertex without intravenous contrast. Coronal and sagittal reformatted images were obtained.  COMPARISON EXAMINATION: 2020 CT and 2020 MR  FINDINGS:  VENTRICLES AND SULCI:  Prominent in size compatible with age-appropriate volume loss. A cavum septum pellucidum and vergae is again seen.  INTRA-AXIAL:  A chronic right thalamic lacune is stable. A chronic right temporo-occipital infarct is seen, unchanged. No mass effect or hemorrhage. Patchy areas of white matter hypodensity are seen compatible with moderate microvascular-type changes.  EXTRA-AXIAL:  No mass or collection is seen.  VISUALIZED SINUSES:  Clear.  VISUALIZED MASTOIDS:  Clear.  CALVARIUM:  Normal.  MISCELLANEOUS:  Intraocular lens implants      IMPRESSION:  Stable exam.    No mass effect, hemorrhage or evidence of acute intracranial pathology.        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 new seizures vs. orthostatics.    Pt seen for reported headache, not associated with neurologic changes.  Pt does feel nauseous, for which she is being given Zofran for relief  Neurologic eval grossly intact    PLAN:  >Continue to monitor  >Tylenol 975 mg PO X1 now  >Zofran 4 mg IVP X 1 dose now  >Pt encouraged to increase PO fluid intake  >Plan d/w primary RN  >Will endorse to day team; follow up per Attending

## 2020-07-25 NOTE — CHART NOTE - NSCHARTNOTEFT_GEN_A_CORE
SUZIE ELY  69y Female  Patient is a 69y old  Female who presents with a chief complaint of left sided numbness (25 Jul 2020 14:41)    Event Summary:  Patient reevaluated this evening s/p acutely worsening lethargy noted this morning. Patient now awake & alert, more lucid than this morning, appears at baseline mental status at this time. Repeat CTH reviewed today with no acute findings, Glucose WNL. Discussed patient's earlier change in mental status with Neurology Resident, Joceline, who agrees symptoms likely due to oversedation from medications (flexeril, fentanyl patch, Ativan, Gabapentin, and PRN Oxy). Given considerable improvement of mental status, pt will not require narcan. Will continue to monitor off oxy, flexeril, and ativan for now. Nursing instructed to monitor for signs of withdrawals. Plan to reconsult Neurology if AMS recurs.    Nathaniel Saintus DNP, FNP-BC  #[phone]

## 2020-07-25 NOTE — PROGRESS NOTE ADULT - SUBJECTIVE AND OBJECTIVE BOX
Patient is a 69y old  Female who presents with a chief complaint of left sided numbness (24 Jul 2020 11:33)      SUBJECTIVE / OVERNIGHT EVENTS: no acute events overnight, burning in eyes much improved per patient. Reports mild nausea.     MEDICATIONS  (STANDING):  apixaban 5 milliGRAM(s) Oral every 12 hours  artificial tears (preservative free) Ophthalmic Solution 1 Drop(s) Both EYES three times a day  atorvastatin 40 milliGRAM(s) Oral at bedtime  carvedilol 25 milliGRAM(s) Oral every 12 hours  fentaNYL   Patch  25 MICROgram(s)/Hr. 1 Patch Transdermal every 48 hours  furosemide    Tablet 40 milliGRAM(s) Oral daily  gabapentin 300 milliGRAM(s) Oral three times a day  lamoTRIgine 300 milliGRAM(s) Oral daily  levETIRAcetam 500 milliGRAM(s) Oral two times a day  levothyroxine 150 MICROGram(s) Oral daily  potassium chloride    Tablet ER 20 milliEquivalent(s) Oral daily    MEDICATIONS  (PRN):  acetaminophen   Tablet .. 650 milliGRAM(s) Oral every 6 hours PRN Mild Pain (1 - 3)  cyclobenzaprine 10 milliGRAM(s) Oral three times a day PRN Muscle Spasm  LORazepam     Tablet 2 milliGRAM(s) Oral three times a day PRN Anxiety  oxycodone    5 mG/acetaminophen 325 mG 2 Tablet(s) Oral every 6 hours PRN Moderate Pain (4 - 6)  oxycodone    5 mG/acetaminophen 325 mG 1 Tablet(s) Oral every 6 hours PRN Moderate Pain (4 - 6)      Vital Signs Last 24 Hrs  T(C): 36.2 (25 Jul 2020 12:21), Max: 36.7 (24 Jul 2020 17:06)  T(F): 97.2 (25 Jul 2020 12:21), Max: 98 (24 Jul 2020 17:06)  HR: 56 (25 Jul 2020 12:21) (56 - 78)  BP: 109/69 (25 Jul 2020 12:21) (107/70 - 132/79)  BP(mean): --  RR: 16 (25 Jul 2020 12:21) (16 - 18)  SpO2: 96% (25 Jul 2020 12:21) (95% - 97%)  CAPILLARY BLOOD GLUCOSE      POCT Blood Glucose.: 123 mg/dL (25 Jul 2020 11:30)    I&O's Summary    24 Jul 2020 07:01  -  25 Jul 2020 07:00  --------------------------------------------------------  IN: 480 mL / OUT: 0 mL / NET: 480 mL    25 Jul 2020 07:01  -  25 Jul 2020 14:44  --------------------------------------------------------  IN: 0 mL / OUT: 0 mL / NET: 0 mL        PHYSICAL EXAM:  GENERAL: NAD  EYES: conjunctiva and sclera clear  NECK: Supple, No JVD  CHEST/LUNG: Clear to auscultation bilaterally; No wheeze  HEART: +S1/S2, reg   ABDOMEN: Soft, Nontender, Nondistended  EXTREMITIES: no LE edema   PSYCH: AAOx3      LABS:            CARDIAC MARKERS ( 25 Jul 2020 11:53 )  x     / x     / 29 U/L / x     / 1.0 ng/mL

## 2020-07-25 NOTE — PROVIDER CONTACT NOTE (OTHER) - ACTION/TREATMENT ORDERED:
As per NP no action required at this time. Will continue to monitor pt and notified provider for any other changes.
Provider Notified  ekg and cardiac enzymes ordered and done
Provider at bedside, EKG done, labs drawn. Results reviewed with provider. CT head ordered. Will continue to monitor.
continue to monitor, if happens again call NP

## 2020-07-26 LAB
ANION GAP SERPL CALC-SCNC: 9 MMOL/L — SIGNIFICANT CHANGE UP (ref 5–17)
BUN SERPL-MCNC: 19 MG/DL — SIGNIFICANT CHANGE UP (ref 7–23)
CALCIUM SERPL-MCNC: 9.4 MG/DL — SIGNIFICANT CHANGE UP (ref 8.4–10.5)
CHLORIDE SERPL-SCNC: 101 MMOL/L — SIGNIFICANT CHANGE UP (ref 96–108)
CO2 SERPL-SCNC: 28 MMOL/L — SIGNIFICANT CHANGE UP (ref 22–31)
CREAT SERPL-MCNC: 0.77 MG/DL — SIGNIFICANT CHANGE UP (ref 0.5–1.3)
GLUCOSE SERPL-MCNC: 108 MG/DL — HIGH (ref 70–99)
HCT VFR BLD CALC: 36.1 % — SIGNIFICANT CHANGE UP (ref 34.5–45)
HGB BLD-MCNC: 11.4 G/DL — LOW (ref 11.5–15.5)
MAGNESIUM SERPL-MCNC: 2 MG/DL — SIGNIFICANT CHANGE UP (ref 1.6–2.6)
MCHC RBC-ENTMCNC: 31.1 PG — SIGNIFICANT CHANGE UP (ref 27–34)
MCHC RBC-ENTMCNC: 31.6 GM/DL — LOW (ref 32–36)
MCV RBC AUTO: 98.6 FL — SIGNIFICANT CHANGE UP (ref 80–100)
NRBC # BLD: 0 /100 WBCS — SIGNIFICANT CHANGE UP (ref 0–0)
PHOSPHATE SERPL-MCNC: 4.4 MG/DL — SIGNIFICANT CHANGE UP (ref 2.5–4.5)
PLATELET # BLD AUTO: 220 K/UL — SIGNIFICANT CHANGE UP (ref 150–400)
POTASSIUM SERPL-MCNC: 3.9 MMOL/L — SIGNIFICANT CHANGE UP (ref 3.5–5.3)
POTASSIUM SERPL-SCNC: 3.9 MMOL/L — SIGNIFICANT CHANGE UP (ref 3.5–5.3)
RBC # BLD: 3.66 M/UL — LOW (ref 3.8–5.2)
RBC # FLD: 13.4 % — SIGNIFICANT CHANGE UP (ref 10.3–14.5)
SODIUM SERPL-SCNC: 138 MMOL/L — SIGNIFICANT CHANGE UP (ref 135–145)
WBC # BLD: 4.87 K/UL — SIGNIFICANT CHANGE UP (ref 3.8–10.5)
WBC # FLD AUTO: 4.87 K/UL — SIGNIFICANT CHANGE UP (ref 3.8–10.5)

## 2020-07-26 PROCEDURE — 99233 SBSQ HOSP IP/OBS HIGH 50: CPT

## 2020-07-26 RX ORDER — GABAPENTIN 400 MG/1
300 CAPSULE ORAL THREE TIMES A DAY
Refills: 0 | Status: DISCONTINUED | OUTPATIENT
Start: 2020-07-26 | End: 2020-07-26

## 2020-07-26 RX ORDER — ACETAMINOPHEN 500 MG
1000 TABLET ORAL ONCE
Refills: 0 | Status: COMPLETED | OUTPATIENT
Start: 2020-07-26 | End: 2020-07-26

## 2020-07-26 RX ORDER — GABAPENTIN 400 MG/1
100 CAPSULE ORAL THREE TIMES A DAY
Refills: 0 | Status: DISCONTINUED | OUTPATIENT
Start: 2020-07-26 | End: 2020-07-27

## 2020-07-26 RX ORDER — METOCLOPRAMIDE HCL 10 MG
10 TABLET ORAL EVERY 8 HOURS
Refills: 0 | Status: DISCONTINUED | OUTPATIENT
Start: 2020-07-26 | End: 2020-07-27

## 2020-07-26 RX ORDER — OXYCODONE HYDROCHLORIDE 5 MG/1
5 TABLET ORAL ONCE
Refills: 0 | Status: DISCONTINUED | OUTPATIENT
Start: 2020-07-26 | End: 2020-07-26

## 2020-07-26 RX ADMIN — FENTANYL CITRATE 1 PATCH: 50 INJECTION INTRAVENOUS at 08:23

## 2020-07-26 RX ADMIN — Medication 650 MILLIGRAM(S): at 11:34

## 2020-07-26 RX ADMIN — Medication 1 DROP(S): at 21:53

## 2020-07-26 RX ADMIN — Medication 650 MILLIGRAM(S): at 12:05

## 2020-07-26 RX ADMIN — GABAPENTIN 300 MILLIGRAM(S): 400 CAPSULE ORAL at 14:17

## 2020-07-26 RX ADMIN — Medication 10 MILLIGRAM(S): at 08:34

## 2020-07-26 RX ADMIN — Medication 20 MILLIEQUIVALENT(S): at 11:34

## 2020-07-26 RX ADMIN — Medication 40 MILLIGRAM(S): at 05:31

## 2020-07-26 RX ADMIN — OXYCODONE HYDROCHLORIDE 5 MILLIGRAM(S): 5 TABLET ORAL at 15:17

## 2020-07-26 RX ADMIN — LEVETIRACETAM 500 MILLIGRAM(S): 250 TABLET, FILM COATED ORAL at 17:47

## 2020-07-26 RX ADMIN — GABAPENTIN 300 MILLIGRAM(S): 400 CAPSULE ORAL at 05:31

## 2020-07-26 RX ADMIN — OXYCODONE HYDROCHLORIDE 5 MILLIGRAM(S): 5 TABLET ORAL at 15:45

## 2020-07-26 RX ADMIN — LAMOTRIGINE 300 MILLIGRAM(S): 25 TABLET, ORALLY DISINTEGRATING ORAL at 11:34

## 2020-07-26 RX ADMIN — ATORVASTATIN CALCIUM 40 MILLIGRAM(S): 80 TABLET, FILM COATED ORAL at 21:53

## 2020-07-26 RX ADMIN — APIXABAN 5 MILLIGRAM(S): 2.5 TABLET, FILM COATED ORAL at 05:31

## 2020-07-26 RX ADMIN — Medication 150 MICROGRAM(S): at 05:32

## 2020-07-26 RX ADMIN — Medication 2 MILLIGRAM(S): at 20:38

## 2020-07-26 RX ADMIN — Medication 1 DROP(S): at 05:31

## 2020-07-26 RX ADMIN — FENTANYL CITRATE 1 PATCH: 50 INJECTION INTRAVENOUS at 22:29

## 2020-07-26 RX ADMIN — FENTANYL CITRATE 1 PATCH: 50 INJECTION INTRAVENOUS at 21:33

## 2020-07-26 RX ADMIN — LEVETIRACETAM 500 MILLIGRAM(S): 250 TABLET, FILM COATED ORAL at 05:32

## 2020-07-26 RX ADMIN — GABAPENTIN 100 MILLIGRAM(S): 400 CAPSULE ORAL at 21:52

## 2020-07-26 RX ADMIN — Medication 1 DROP(S): at 14:17

## 2020-07-26 RX ADMIN — CARVEDILOL PHOSPHATE 25 MILLIGRAM(S): 80 CAPSULE, EXTENDED RELEASE ORAL at 17:47

## 2020-07-26 RX ADMIN — APIXABAN 5 MILLIGRAM(S): 2.5 TABLET, FILM COATED ORAL at 17:47

## 2020-07-26 RX ADMIN — CARVEDILOL PHOSPHATE 25 MILLIGRAM(S): 80 CAPSULE, EXTENDED RELEASE ORAL at 05:32

## 2020-07-26 RX ADMIN — Medication 1000 MILLIGRAM(S): at 15:45

## 2020-07-26 RX ADMIN — Medication 400 MILLIGRAM(S): at 15:17

## 2020-07-26 NOTE — PROGRESS NOTE ADULT - SUBJECTIVE AND OBJECTIVE BOX
Patient is a 69y old  Female who presents with a chief complaint of left sided numbness (25 Jul 2020 14:41)      SUBJECTIVE / OVERNIGHT EVENTS: patient is more alert and awake this morning, however lethargic at times     MEDICATIONS  (STANDING):  acetaminophen  IVPB .. 1000 milliGRAM(s) IV Intermittent once  apixaban 5 milliGRAM(s) Oral every 12 hours  artificial tears (preservative free) Ophthalmic Solution 1 Drop(s) Both EYES three times a day  atorvastatin 40 milliGRAM(s) Oral at bedtime  carvedilol 25 milliGRAM(s) Oral every 12 hours  fentaNYL   Patch  25 MICROgram(s)/Hr. 1 Patch Transdermal every 48 hours  furosemide    Tablet 40 milliGRAM(s) Oral daily  gabapentin 300 milliGRAM(s) Oral three times a day  lamoTRIgine 300 milliGRAM(s) Oral daily  levETIRAcetam 500 milliGRAM(s) Oral two times a day  levothyroxine 150 MICROGram(s) Oral daily  oxyCODONE    IR 5 milliGRAM(s) Oral once  potassium chloride    Tablet ER 20 milliEquivalent(s) Oral daily    MEDICATIONS  (PRN):  acetaminophen   Tablet .. 650 milliGRAM(s) Oral every 6 hours PRN Mild Pain (1 - 3)  metoclopramide Injectable 10 milliGRAM(s) IV Push every 8 hours PRN Nausea/Headache      Vital Signs Last 24 Hrs  T(C): 36.4 (26 Jul 2020 12:02), Max: 36.6 (26 Jul 2020 04:50)  T(F): 97.5 (26 Jul 2020 12:02), Max: 97.8 (26 Jul 2020 04:50)  HR: 76 (26 Jul 2020 12:02) (66 - 76)  BP: 123/79 (26 Jul 2020 12:02) (104/68 - 145/80)  BP(mean): --  RR: 18 (26 Jul 2020 12:02) (16 - 18)  SpO2: 96% (26 Jul 2020 12:02) (96% - 100%)  CAPILLARY BLOOD GLUCOSE        I&O's Summary    25 Jul 2020 07:01  -  26 Jul 2020 07:00  --------------------------------------------------------  IN: 360 mL / OUT: 0 mL / NET: 360 mL    26 Jul 2020 07:01  -  26 Jul 2020 15:17  --------------------------------------------------------  IN: 240 mL / OUT: 0 mL / NET: 240 mL        PHYSICAL EXAM:  GENERAL: NAD  EYES:  conjunctiva and sclera clear  CHEST/LUNG: Clear to auscultation bilaterally; No wheeze  HEART: +S1/S2, reg   ABDOMEN: Soft, Nontender, Nondistended  EXTREMITIES:  no LE edema   PSYCH: AAOx3, lethargic     LABS:                        11.4   4.87  )-----------( 220      ( 26 Jul 2020 05:45 )             36.1     07-26    138  |  101  |  19  ----------------------------<  108<H>  3.9   |  28  |  0.77    Ca    9.4      26 Jul 2020 05:45  Phos  4.4     07-26  Mg     2.0     07-26        CARDIAC MARKERS ( 25 Jul 2020 11:53 )  x     / x     / 29 U/L / x     / 1.0 ng/mL

## 2020-07-27 ENCOUNTER — TRANSCRIPTION ENCOUNTER (OUTPATIENT)
Age: 70
End: 2020-07-27

## 2020-07-27 VITALS
HEART RATE: 81 BPM | OXYGEN SATURATION: 96 % | RESPIRATION RATE: 18 BRPM | TEMPERATURE: 97 F | SYSTOLIC BLOOD PRESSURE: 132 MMHG | DIASTOLIC BLOOD PRESSURE: 78 MMHG

## 2020-07-27 PROCEDURE — 83735 ASSAY OF MAGNESIUM: CPT

## 2020-07-27 PROCEDURE — 82553 CREATINE MB FRACTION: CPT

## 2020-07-27 PROCEDURE — 84100 ASSAY OF PHOSPHORUS: CPT

## 2020-07-27 PROCEDURE — 85610 PROTHROMBIN TIME: CPT

## 2020-07-27 PROCEDURE — 85652 RBC SED RATE AUTOMATED: CPT

## 2020-07-27 PROCEDURE — 95700 EEG CONT REC W/VID EEG TECH: CPT

## 2020-07-27 PROCEDURE — 99239 HOSP IP/OBS DSCHRG MGMT >30: CPT

## 2020-07-27 PROCEDURE — 93005 ELECTROCARDIOGRAM TRACING: CPT

## 2020-07-27 PROCEDURE — 82962 GLUCOSE BLOOD TEST: CPT

## 2020-07-27 PROCEDURE — 70551 MRI BRAIN STEM W/O DYE: CPT

## 2020-07-27 PROCEDURE — 80048 BASIC METABOLIC PNL TOTAL CA: CPT

## 2020-07-27 PROCEDURE — 97162 PT EVAL MOD COMPLEX 30 MIN: CPT

## 2020-07-27 PROCEDURE — 83036 HEMOGLOBIN GLYCOSYLATED A1C: CPT

## 2020-07-27 PROCEDURE — 93321 DOPPLER ECHO F-UP/LMTD STD: CPT

## 2020-07-27 PROCEDURE — 86160 COMPLEMENT ANTIGEN: CPT

## 2020-07-27 PROCEDURE — 84443 ASSAY THYROID STIM HORMONE: CPT

## 2020-07-27 PROCEDURE — 85027 COMPLETE CBC AUTOMATED: CPT

## 2020-07-27 PROCEDURE — 82435 ASSAY OF BLOOD CHLORIDE: CPT

## 2020-07-27 PROCEDURE — 87086 URINE CULTURE/COLONY COUNT: CPT

## 2020-07-27 PROCEDURE — 84484 ASSAY OF TROPONIN QUANT: CPT

## 2020-07-27 PROCEDURE — 97530 THERAPEUTIC ACTIVITIES: CPT

## 2020-07-27 PROCEDURE — 70496 CT ANGIOGRAPHY HEAD: CPT

## 2020-07-27 PROCEDURE — 71045 X-RAY EXAM CHEST 1 VIEW: CPT

## 2020-07-27 PROCEDURE — 97116 GAIT TRAINING THERAPY: CPT

## 2020-07-27 PROCEDURE — 81001 URINALYSIS AUTO W/SCOPE: CPT

## 2020-07-27 PROCEDURE — 93308 TTE F-UP OR LMTD: CPT

## 2020-07-27 PROCEDURE — 82947 ASSAY GLUCOSE BLOOD QUANT: CPT

## 2020-07-27 PROCEDURE — 84439 ASSAY OF FREE THYROXINE: CPT

## 2020-07-27 PROCEDURE — 86803 HEPATITIS C AB TEST: CPT

## 2020-07-27 PROCEDURE — 80053 COMPREHEN METABOLIC PANEL: CPT

## 2020-07-27 PROCEDURE — 95714 VEEG EA 12-26 HR UNMNTR: CPT

## 2020-07-27 PROCEDURE — 85730 THROMBOPLASTIN TIME PARTIAL: CPT

## 2020-07-27 PROCEDURE — 70450 CT HEAD/BRAIN W/O DYE: CPT

## 2020-07-27 PROCEDURE — 83605 ASSAY OF LACTIC ACID: CPT

## 2020-07-27 PROCEDURE — 99285 EMERGENCY DEPT VISIT HI MDM: CPT

## 2020-07-27 PROCEDURE — 82330 ASSAY OF CALCIUM: CPT

## 2020-07-27 PROCEDURE — 93306 TTE W/DOPPLER COMPLETE: CPT

## 2020-07-27 PROCEDURE — 85014 HEMATOCRIT: CPT

## 2020-07-27 PROCEDURE — 82550 ASSAY OF CK (CPK): CPT

## 2020-07-27 PROCEDURE — 80061 LIPID PANEL: CPT

## 2020-07-27 PROCEDURE — 86769 SARS-COV-2 COVID-19 ANTIBODY: CPT

## 2020-07-27 PROCEDURE — 82803 BLOOD GASES ANY COMBINATION: CPT

## 2020-07-27 PROCEDURE — 84481 FREE ASSAY (FT-3): CPT

## 2020-07-27 PROCEDURE — 70498 CT ANGIOGRAPHY NECK: CPT

## 2020-07-27 PROCEDURE — G0378: CPT

## 2020-07-27 PROCEDURE — 84295 ASSAY OF SERUM SODIUM: CPT

## 2020-07-27 PROCEDURE — 84132 ASSAY OF SERUM POTASSIUM: CPT

## 2020-07-27 RX ORDER — FENTANYL CITRATE 50 UG/ML
1 INJECTION INTRAVENOUS
Refills: 0 | Status: DISCONTINUED | OUTPATIENT
Start: 2020-07-27 | End: 2020-07-27

## 2020-07-27 RX ORDER — OXYCODONE AND ACETAMINOPHEN 5; 325 MG/1; MG/1
1 TABLET ORAL EVERY 8 HOURS
Refills: 0 | Status: DISCONTINUED | OUTPATIENT
Start: 2020-07-27 | End: 2020-07-27

## 2020-07-27 RX ORDER — GABAPENTIN 400 MG/1
1 CAPSULE ORAL
Qty: 0 | Refills: 0 | DISCHARGE

## 2020-07-27 RX ORDER — FENTANYL CITRATE 50 UG/ML
1 INJECTION INTRAVENOUS
Refills: 0 | Status: DISCONTINUED | OUTPATIENT
Start: 2020-07-28 | End: 2020-07-27

## 2020-07-27 RX ORDER — CYCLOBENZAPRINE HYDROCHLORIDE 10 MG/1
1 TABLET, FILM COATED ORAL
Qty: 0 | Refills: 0 | DISCHARGE

## 2020-07-27 RX ORDER — GABAPENTIN 400 MG/1
1 CAPSULE ORAL
Qty: 90 | Refills: 0
Start: 2020-07-27 | End: 2020-08-25

## 2020-07-27 RX ADMIN — Medication 40 MILLIGRAM(S): at 05:32

## 2020-07-27 RX ADMIN — GABAPENTIN 100 MILLIGRAM(S): 400 CAPSULE ORAL at 12:41

## 2020-07-27 RX ADMIN — CARVEDILOL PHOSPHATE 25 MILLIGRAM(S): 80 CAPSULE, EXTENDED RELEASE ORAL at 05:32

## 2020-07-27 RX ADMIN — APIXABAN 5 MILLIGRAM(S): 2.5 TABLET, FILM COATED ORAL at 17:06

## 2020-07-27 RX ADMIN — APIXABAN 5 MILLIGRAM(S): 2.5 TABLET, FILM COATED ORAL at 05:32

## 2020-07-27 RX ADMIN — OXYCODONE AND ACETAMINOPHEN 1 TABLET(S): 5; 325 TABLET ORAL at 09:30

## 2020-07-27 RX ADMIN — Medication 2 MILLIGRAM(S): at 12:41

## 2020-07-27 RX ADMIN — CARVEDILOL PHOSPHATE 25 MILLIGRAM(S): 80 CAPSULE, EXTENDED RELEASE ORAL at 17:06

## 2020-07-27 RX ADMIN — FENTANYL CITRATE 1 PATCH: 50 INJECTION INTRAVENOUS at 08:36

## 2020-07-27 RX ADMIN — LEVETIRACETAM 500 MILLIGRAM(S): 250 TABLET, FILM COATED ORAL at 05:32

## 2020-07-27 RX ADMIN — GABAPENTIN 100 MILLIGRAM(S): 400 CAPSULE ORAL at 05:32

## 2020-07-27 RX ADMIN — LEVETIRACETAM 500 MILLIGRAM(S): 250 TABLET, FILM COATED ORAL at 17:06

## 2020-07-27 RX ADMIN — Medication 1 DROP(S): at 12:41

## 2020-07-27 RX ADMIN — Medication 20 MILLIEQUIVALENT(S): at 12:41

## 2020-07-27 RX ADMIN — Medication 150 MICROGRAM(S): at 05:32

## 2020-07-27 RX ADMIN — Medication 1 DROP(S): at 05:31

## 2020-07-27 RX ADMIN — LAMOTRIGINE 300 MILLIGRAM(S): 25 TABLET, ORALLY DISINTEGRATING ORAL at 12:41

## 2020-07-27 RX ADMIN — OXYCODONE AND ACETAMINOPHEN 1 TABLET(S): 5; 325 TABLET ORAL at 10:04

## 2020-07-27 NOTE — PROGRESS NOTE ADULT - PROBLEM SELECTOR PLAN 3
- see above  - c/w keppra 500 mg   - EEG negative for epileptiform activity  - Per neuro, no contraindication for d/c from their standpoint.
- see above  - c/w keppra 500 mg   - EEG negative for epileptiform activity  - neuro-checks q6h
- see above  - c/w keppra 500 mg   - f/up EEG  - neuro-checks q6h

## 2020-07-27 NOTE — DISCHARGE NOTE PROVIDER - HOSPITAL COURSE
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 new seizures vs. orthostatics. CVA ruled out on imaging, now with lethargy due to multiple naroctic/sedating medications, improving:          Problem/Plan - 1:    ·  Problem: Encephalopathy acute.  Plan: - orthostatic negative    - MR negative for stroke    - EEG report negative for epileptiform activity    - limited TTE repeated EF 65%, no acute findings     - c/w statin and eliquis     - had acutely worsening lethargy on 7/25, CT head was neg for any acute changes. This is likely due to oversedation from pain meds, pain meds changed, now pt is back to baseline.     -c/w gabapentin 100mg TID     -Limit narcotics    - Pt to follow up with chronic pain management outpt.          Problem/Plan - 2:    ·  Problem: Cerebrovascular accident (CVA) due to other mechanism.  Plan: - h/o CVA in Feb 2020 ;now appears to have had a second ischemic event    - TTe without any wall motion abnl     - No telemetry events         Problem/Plan - 3:    ·  Problem: Seizure.  Plan: - see above    - c/w keppra 500 mg     - EEG negative for epileptiform activity    - Per neuro, no contraindication for d/c from their standpoint.          Problem/Plan - 4:    ·  Problem: Paroxysmal atrial fibrillation.  Plan: - rates controlled    - c/w eliquis 5 mg po bid.          Problem/Plan - 5:    ·  Problem: Essential hypertension.  Plan: - bp acceptable     - c/w carvedilol 25 mg po bid    - patient was started on losartan 100 mg po daily as outpatient on 7/19 but has not filled prescriptions as of yet    # diastolic chf    - c/w lasix 40 mg po daily with potassium supplementation.          Problem/Plan - 6:    Problem: Other hyperlipidemia. Plan: - c/w atorvastatin 40 mg po qhs.         Problem/Plan - 7:    ·  Problem: Hypothyroidism, unspecified type.  Plan: -  Synthroid decreased to 150 mcg daily per Endocrine recs    - PCP to repeat TFTs in about 4 weeks.          Problem/Plan - 8:    ·  Problem: H/O aortic aneurysm.  Plan: - AVR (tissue) and ascending aorta replacement with Dr. Alvares on 2/19    - has been unable to f/up out pt on account of covid pandemic.          Problem/Plan - 9:    ·  Problem: Pain management.  Plan: - history of fibromyalgia    - c/w fentanyl patch 25 mg q3days and percocet 10/325 po q8h prn    - c/w ativan 2 mg po daily PRN for anxiety     - c/w neurontin 100 mg po tid for neuropathy.          Problem/Plan - 10:    Problem: DVT prophylaxis. Plan; - c/w eliquis 5 mg po bid.        DCP with medication reconciliation discussed with Dr. Ruiz.     Patient cleared for discharge home. Recommended for home physical therapy, however patient refusing home care services.     Follow up with PCP and neurologist in 1-2 weeks. 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 Patient has extensive work up, all wnl. MR negative for stroke EEG report negative for epileptiform activity,, limited TTE repeated EF 65%, no acute findings. Pt's lethargy was likely due to oversedation from pain meds. Pain meds changed, now pt is back to baseline. Pt to follow up with chronic pain management outpt. Patient cleared for discharge home. Recommended for home physical therapy, however patient refusing home care services. Follow up with pain managment and neurologist in 1-2 weeks.

## 2020-07-27 NOTE — PROGRESS NOTE ADULT - PROBLEM SELECTOR PLAN 1
- orthostatic negative  - MR negative for stroke  - ?TIA vs. seizure disorder   - EEG report negative for epileptiform activity  - TTE needs to be repeated as initial was poor study   - c/w telemetry  - neuro checks q6h  - c/w statin and eliquis   - c/w keppra 500 mg po bid for seizures  - had acutely worsening lethargy on 7/25, CT head was neg for any acute changes.  ?oversedation from medication? Pt is on flexeril, fentanyl patch, Ativan, Gabapentin, and PRN Oxy.  Will d/c oxy, flexeril, and ativan  -still mildly lethargic this morning  -gabapentin downtitrated to 100mg TID   -AVOID narcotics  -downtitrate fentanyl patch if encephalopathy persists
- orthostatic negative  - MR negative for stroke  - ?TIA vs. seizure disorder   - EEG report negative for epileptiform activity  - TTE needs to be repeated as initial was poor study   - c/w telemetry  - neuro checks q6h  - c/w statin and eliquis   - c/w keppra 500 mg po bid for seizures  - today with acutely worsening lethargy, CT head ordered. Glucose WNL. ?oversedation from medication? Pt is on flexeril, fentanyl patch, Ativan, Gabapentin, and PRN Oxy. If CT imaging is negative, pt will likely need narcan. Will d/c oxy, flexeril, and ativan for now.   -neuro re-called for evaluation
- orthostatic negative  - MR negative for stroke  - EEG report negative for epileptiform activity  - limited TTE repeated EF 65%, no acute findings   - c/w telemetry  - neuro checks q6h  - c/w statin and eliquis   - c/w keppra 500 mg po bid for seizures  - had acutely worsening lethargy on 7/25, CT head was neg for any acute changes. This is likely due to oversedation from pain meds, pain meds changed, now pt is back to baseline.   -c/w gabapentin 100mg TID   -AVOID narcotics  - Pt to follow up with chronic pain management outpt
- resolving ; pt is  AAOX3  - reports no change in baseline right sided weakness  - orthostatic negative  - MR negative for stroke  - ?TIA vs. seizure disorder   - EEG report negative for epileptiform activity  - TTE needs to be repeated as initial was poor study   - c/w telemetry  - neuro checks q6h  - c/w statin and eliquis   - c/w keppra 500 mg po bid for seizures  - Neurology f/u
- resolving ; pt is  AAOX3  - reports no change in baseline right sided weakness  - orthostatic negative  - MR negative for stroke  - ?TIA vs. seizure disorder   - EEG report negative for epileptiform activity  - TTE with bubble study pending  - c/w telemetry  - TSH low ; free t3 wnl,t4 elevated ; unclear etiology ; obtain Endocrine consult  - ESR elevated  - lipid profile, hba1c reviewed  - neuro checks q6h  - c/w statin and eliquis   - c/w keppra 500 mg po bid for seizures  - appreciate Neurology consult
- resolving ; pt is  AAOX3  - reports no change in baseline right sided weakness  - orthostatic negative  - MR negative for stroke  - ?TIA vs. seizure disorder   - EEG report negative for epileptiform activity  - f/up TTE with bubble study   - c/w telemetry  - neuro checks q6h  - c/w statin and eliquis   - c/w keppra 500 mg po bid for seizures  - appreciate Neurology consult
- resolving ; pt is  AAOX3  - reports no change in baseline right sided weakness  - orthostatic negative  - MR negative for stroke  - ?TIA vs. seizure disorder   - f/up EEG report  - TTE with bubble study ordered  - c/w telemetry  - TSH low ; check free t3,t4  - ESR elevated  - lipid profile, hba1c reviewed  - neuro checks q6h  - c/w statin and eliquis   - c/w keppra 500 mg po bid for seizures  - appreciate Neurology consult

## 2020-07-27 NOTE — DISCHARGE NOTE PROVIDER - NSDCMRMEDTOKEN_GEN_ALL_CORE_FT
acetaminophen 325 mg oral tablet: 2 tab(s) orally every 6 hours, As needed, Mild Pain (1 - 3)  atorvastatin 40 mg oral tablet: 1 tab(s) orally once a day  Coreg 25 mg oral tablet: 1 tab(s) orally 2 times a day  Eliquis 5 mg oral tablet: 1 tab(s) orally 2 times a day  fentanyl topical 25 mcg/hr transdermal film, extended release (obsolete): 1 patch transdermal every 3 days  furosemide 40 mg oral tablet: 1 tab(s) orally once a day  gabapentin 100 mg oral capsule: 1 cap(s) orally 3 times a day  Keppra 500 mg oral tablet: 1 tab(s) orally 2 times a day  lamoTRIgine 300 mg oral tablet, extended release: 1 tab(s) orally once a day  LORazepam 2 mg oral tablet: 1 tab(s) orally 3 times a day, As Needed  potassium chloride 20 mEq oral granule, extended release: 1 tab(s) orally once a day  Synthroid 175 mcg (0.175 mg) oral tablet: 1 tab(s) orally once a day

## 2020-07-27 NOTE — PROGRESS NOTE ADULT - PROBLEM SELECTOR PLAN 5
- bp acceptable   - c/w carvedilol 25 mg po bid  - patient was started on losartan 100 mg po daily as outpatient on 7/19 but has not filled prescriptions as  of yet    # diastolic chf  - c/w lasix 40 mg po daily with potassium supplementation
- bp acceptable   - c/w carvediolol 25 mg po bid  - patient was started on losartan 100 mg po daily as outpatient on 7/19 but has not filled prescriptions as  of yet    # diastolic chf  - c/w lasix 40 mg po daily with potassium supplementation
- bp acceptable   - c/w carvediolol 25 mg po bid  - patient was started on losartan 100 mg po daily as outpatient on 7/19 but has not filled prescriptions as  of yet    # diastolic chf  - c/w lasix 40 mg po daily with potassium supplementation

## 2020-07-27 NOTE — PROGRESS NOTE ADULT - ATTENDING COMMENTS
D/C home with home PT once encephalopathy improved
Patient is medically stable for d/c home today, to follow up with pain management outpt. time spent 40 min
Patient's daughter updated on plan of care
Pt's daughter Larissa updated on plan of care.

## 2020-07-27 NOTE — PROGRESS NOTE ADULT - PROBLEM SELECTOR PROBLEM 1
19w2d  No new complaint  Doing well.  Normal quad screen  Normal prenatal profile  Taking prenatal vitamins    Re-check TSH  Normal hemoglobin A1c.  Will get Aditya next visit    Back in 4 weeks    
Encephalopathy acute

## 2020-07-27 NOTE — PROGRESS NOTE ADULT - PROBLEM SELECTOR PLAN 9
- history of fibromyalgia  - c/w fentanyl patch 25 mg q3days and percocet 10/325 po q5h prn  - c/w ativan 2 mg po tid PRN for anxiety   - c/w neurontin 300 mg po tid for neuropathy   - c/w flexeril 10 mg po tid prn for spasm
- history of fibromyalgia  - c/w fentanyl patch 25 mg q3days and percocet 10/325 po q8h prn  - c/w ativan 2 mg po daily PRN for anxiety   - c/w neurontin 100 mg po tid for neuropathy
- history of fibromyalgia  - c/w fentanyl patch 25 mg q3days and percocet 10/325 po q5h prn  - c/w ativan 2 mg po tid PRN for anxiety   - c/w neurontin 300 mg po tid for neuropathy   - c/w flexeril 10 mg po tid prn for spasm

## 2020-07-27 NOTE — PROGRESS NOTE ADULT - PROBLEM SELECTOR PROBLEM 9
Pain management

## 2020-07-27 NOTE — PROGRESS NOTE ADULT - PROBLEM SELECTOR PLAN 4
- rates controlled  - c/w eliquis 5 mg po bid
- rates controlled  - c/w eliquis 5 mg po bid   - c/w telemetry

## 2020-07-27 NOTE — DISCHARGE NOTE PROVIDER - NSDCCPCAREPLAN_GEN_ALL_CORE_FT
PRINCIPAL DISCHARGE DIAGNOSIS  Diagnosis: TIA (transient ischemic attack)  Assessment and Plan of Treatment: Return to hospital if you develop weakness, speech problems, facial droop, or changes in mental status  - MR negative for stroke  - EEG report negative for epileptiform activity  - limited TTE repeated EF 65%, no acute findings   - c/w statin and eliquis   - had acutely worsening lethargy on 7/25, CT head was neg for any acute changes. This is likely due to oversedation from pain meds,   -c/w gabapentin 100mg TID   -Limit narcotics  - Pt to follow up with chronic pain management outpt.

## 2020-07-27 NOTE — PROGRESS NOTE ADULT - PROBLEM SELECTOR PROBLEM 2
Cerebrovascular accident (CVA) due to other mechanism

## 2020-07-27 NOTE — PROGRESS NOTE ADULT - PROBLEM SELECTOR PROBLEM 10
DVT prophylaxis

## 2020-07-27 NOTE — PROGRESS NOTE ADULT - SUBJECTIVE AND OBJECTIVE BOX
Patient is a 69y old  Female who presents with a chief complaint of left sided numbness (26 Jul 2020 15:17)      SUBJECTIVE / OVERNIGHT EVENTS: Patient seen and examined at bedside. She is feeling well, states she thinks her was getting too much pain meds which were making her dowsy. States she would like to go home today. She is back to her baseline mental status. No acute events overnight.     ROS:  All other review of systems negative    Allergies    Avelox (Rash)    Intolerances        MEDICATIONS  (STANDING):  apixaban 5 milliGRAM(s) Oral every 12 hours  artificial tears (preservative free) Ophthalmic Solution 1 Drop(s) Both EYES three times a day  atorvastatin 40 milliGRAM(s) Oral at bedtime  carvedilol 25 milliGRAM(s) Oral every 12 hours  furosemide    Tablet 40 milliGRAM(s) Oral daily  gabapentin 100 milliGRAM(s) Oral three times a day  lamoTRIgine 300 milliGRAM(s) Oral daily  levETIRAcetam 500 milliGRAM(s) Oral two times a day  levothyroxine 150 MICROGram(s) Oral daily  potassium chloride    Tablet ER 20 milliEquivalent(s) Oral daily    MEDICATIONS  (PRN):  acetaminophen   Tablet .. 650 milliGRAM(s) Oral every 6 hours PRN Mild Pain (1 - 3)  metoclopramide Injectable 10 milliGRAM(s) IV Push every 8 hours PRN Nausea/Headache  oxycodone    5 mG/acetaminophen 325 mG 1 Tablet(s) Oral every 8 hours PRN Severe Pain (7 - 10)      Vital Signs Last 24 Hrs  T(C): 36.4 (27 Jul 2020 12:10), Max: 36.8 (27 Jul 2020 07:55)  T(F): 97.6 (27 Jul 2020 12:10), Max: 98.3 (27 Jul 2020 07:55)  HR: 75 (27 Jul 2020 12:10) (67 - 75)  BP: 146/85 (27 Jul 2020 12:10) (119/74 - 154/83)  BP(mean): --  RR: 18 (27 Jul 2020 12:10) (18 - 18)  SpO2: 96% (27 Jul 2020 12:10) (95% - 99%)  CAPILLARY BLOOD GLUCOSE        I&O's Summary    26 Jul 2020 07:01  -  27 Jul 2020 07:00  --------------------------------------------------------  IN: 840 mL / OUT: 0 mL / NET: 840 mL    27 Jul 2020 07:01  -  27 Jul 2020 13:34  --------------------------------------------------------  IN: 530 mL / OUT: 0 mL / NET: 530 mL        PHYSICAL EXAM:  GENERAL: NAD, well-developed  HEAD:  Atraumatic, Normocephalic  EYES: EOMI, PERRLA, conjunctiva and sclera clear  NECK: Supple, No JVD  CHEST/LUNG: Clear to auscultation bilaterally; No wheeze  HEART: Regular rate and rhythm; No murmurs, rubs, or gallops  ABDOMEN: Soft, Nontender, Nondistended; Bowel sounds present  EXTREMITIES:  2+ Peripheral Pulses, No clubbing, cyanosis, or edema  NEUROLOGY: AAOx3, non-focal  PSYCH: calm  SKIN: No rashes or lesions    LABS:                        11.4   4.87  )-----------( 220      ( 26 Jul 2020 05:45 )             36.1     07-26    138  |  101  |  19  ----------------------------<  108<H>  3.9   |  28  |  0.77    Ca    9.4      26 Jul 2020 05:45  Phos  4.4     07-26  Mg     2.0     07-26                RADIOLOGY & ADDITIONAL TESTS:    Care Discussed with Consultants/Other Providers: Medicine NP

## 2020-07-27 NOTE — PROGRESS NOTE ADULT - PROBLEM SELECTOR PLAN 7
-  Synthroid decreased to 150 mcg daily per Endocrine reccs  - PCP to repeat TFTs in about 4 weeks
-  Synthroid decreased to 150 mcg daily per Endocrine recs  - PCP to repeat TFTs in about 4 weeks
-  Synthroid decreased to 150 mcg daily per Endocrine reccs  - PCP to repeat TFTs in about 4 weeks
-  TSH low ; free t3 wnl,t4 elevated ; unclear etiology ; obtain Endocrine consult  - - c/w synthroid 175 mcg daily for now
- c/w synthroid 175 mcg daily  - TSH low ; will check free t3,t4

## 2020-07-27 NOTE — PROGRESS NOTE ADULT - ASSESSMENT
69 year old RH F with a PMH of right sided ischemic infarct (had diplopia at the time, now has residual left sided numbness/weakness/pain), atrial fibrillation (on eliquis), seizure disorder (diagnosed in 4/2020 at [facility] on [facility]), HTN, former tobacco use, hypothyroidism, HLD, RA, OA, SLE, lumbar spinal stenosis, congenital hip deformity (AVN of left hip, s/p R hip replacement), uterine cancer (s/p EVELYN/?BSO in 2006), s/p AVR/ascending aortic arch aneurysm repair in 2/2020 who presented with episodes of confusion and difficulty with balance concerning for new CVA vs. new seizures vs. orthostatics.
69 year old RH F with a PMH of right sided ischemic infarct (had diplopia at the time, now has residual left sided numbness/weakness/pain), atrial fibrillation (on eliquis), seizure disorder (diagnosed in 4/2020 at [facility] on [facility]), HTN, former tobacco use, hypothyroidism, HLD, RA, OA, SLE, lumbar spinal stenosis, congenital hip deformity (AVN of left hip, s/p R hip replacement), uterine cancer (s/p EVELYN/?BSO in 2006), s/p AVR/ascending aortic arch aneurysm repair in 2/2020 who presented with episodes of confusion and difficulty with balance concerning for new CVA vs. new seizures vs. orthostatics. CVA ruled out on imaging, now with lethargy due to multiple naroctic/sedating medications, improving:
69 year old RH F with a PMH of right sided ischemic infarct (had diplopia at the time, now has residual left sided numbness/weakness/pain), atrial fibrillation (on eliquis), seizure disorder (diagnosed in 4/2020 at [facility] on [facility]), HTN, former tobacco use, hypothyroidism, HLD, RA, OA, SLE, lumbar spinal stenosis, congenital hip deformity (AVN of left hip, s/p R hip replacement), uterine cancer (s/p EVELYN/?BSO in 2006), s/p AVR/ascending aortic arch aneurysm repair in 2/2020 who presented with episodes of confusion and difficulty with balance concerning for new CVA vs. new seizures vs. orthostatics.
69 year old RH F with a PMH of right sided ischemic infarct (had diplopia at the time, now has residual left sided numbness/weakness/pain), atrial fibrillation (on eliquis), seizure disorder (diagnosed in 4/2020 at [facility] on [facility]), HTN, former tobacco use, hypothyroidism, HLD, RA, OA, SLE, lumbar spinal stenosis, congenital hip deformity (AVN of left hip, s/p R hip replacement), uterine cancer (s/p EVELYN/?BSO in 2006), s/p AVR/ascending aortic arch aneurysm repair in 2/2020 who presented with episodes of confusion and difficulty with balance concerning for new CVA vs. new seizures vs. orthostatics. CVA ruled out on imaging, now with lethargy due to multiple naroctic/sedating medications, improving:
69 year old RH F with a PMH of right sided ischemic infarct (had diplopia at the time, now has residual left sided numbness/weakness/pain), atrial fibrillation (on eliquis), seizure disorder (diagnosed in 4/2020 at [facility] on [facility]), HTN, former tobacco use, hypothyroidism, HLD, RA, OA, SLE, lumbar spinal stenosis, congenital hip deformity (AVN of left hip, s/p R hip replacement), uterine cancer (s/p EVELYN/?BSO in 2006), s/p AVR/ascending aortic arch aneurysm repair in 2/2020 who presented with episodes of confusion and difficulty with balance concerning for new CVA vs. new seizures vs. orthostatics.
69 year old RH F with a PMH of right sided ischemic infarct (had diplopia at the time, now has residual left sided numbness/weakness/pain), atrial fibrillation (on eliquis), seizure disorder (diagnosed in 4/2020 at [facility] on [facility]), HTN, former tobacco use, hypothyroidism, HLD, RA, OA, SLE, lumbar spinal stenosis, congenital hip deformity (AVN of left hip, s/p R hip replacement), uterine cancer (s/p EVELYN/?BSO in 2006), s/p AVR/ascending aortic arch aneurysm repair in 2/2020 who presented with episodes of confusion and difficulty with balance concerning for new CVA vs. new seizures vs. orthostatics.
69 year old RH F with a PMH of right sided ischemic infarct (had diplopia at the time, now has residual left sided numbness/weakness/pain), atrial fibrillation (on eliquis), seizure disorder (diagnosed in 4/2020 at [facility], on [facility]), HTN, former tobacco use, hypothyroidism, HLD, RA, OA, SLE, lumbar spinal stenosis, congenital hip deformity (AVN of left hip, s/p R hip replacement), uterine cancer (s/p EVELYN/?BSO in 2006), s/p AVR/ascending aortic arch aneurysm repair in 2/2020 who presented with episodes of confusion and difficulty with balance concerning for new CVA vs. new seizures vs. orthostatics.

## 2020-07-27 NOTE — PROGRESS NOTE ADULT - PROBLEM SELECTOR PROBLEM 7
Hypothyroidism, unspecified type

## 2020-07-27 NOTE — PROGRESS NOTE ADULT - PROBLEM SELECTOR PLAN 2
- h/o CVA in Feb 2020 ; now appears to have had a second ischemic event  - TTe without any wall motion abnl   - c/w tele monitoring ; no events so far
- h/o CVA in Feb 2020 ; now appears to have had a second ischemic event  - f/up TTE w/bubble study as above  - c/w tele monitoring ; no events so far
- h/o CVA in Feb 2020 ; now appears to have had a second ischemic event  - f/up TTE w/bubble study as above  - c/w tele monitoring ; no events so far
- h/o CVA in Feb 2020 ; now appears to have had a second ischemic event  - obtain TTE w/bubble study as above  - c/w tele monitoring ; no events so far
- see above

## 2020-07-27 NOTE — DISCHARGE NOTE PROVIDER - PROVIDER TOKENS
FREE:[LAST:[Rubén],FIRST:[Chetan],PHONE:[phone]],FAX:[phone]   [phone]    -],ADDRESS:[[address] [place] [address] [address] [place] [address]  [id number]]],PROVIDER:[[id number]]]

## 2020-07-27 NOTE — DISCHARGE NOTE NURSING/CASE MANAGEMENT/SOCIAL WORK - PATIENT PORTAL LINK FT
You can access the FollowMyHealth Patient Portal offered by [facility] by registering at the following website: [email or web address]. By joining [employer]’s FollowMyHealth portal, you will also be able to view your health information using other applications (apps) compatible with our system.

## 2020-07-27 NOTE — CHART NOTE - NSCHARTNOTEFT_GEN_A_CORE
Reference #: [id number]       04/21/2020 05/03/2020 fentanyl 25 mcg/hr patch  10 30 Cora Cho (NP) Insurance [facility] #[id number]   03/31/2020 03/31/2020 oxycodone-acetaminophen  mg tab  120 20 JeseniaDeshawn reyna MIKAEL DO Insurance [facility] #[id number]   01/30/2020 01/30/2020 oxycodone-acetaminophen  mg tab  120 20 [place], Alexandre Insurance [facility] #[id number]   01/08/2020 01/08/2020 lorazepam 2 mg tablet  90 30 Chetan Montana DO Insurance [facility] #[id number]   01/03/2020 01/04/2020 lorazepam 2 mg tablet  14 5 Chetan Montana DO Insurance [facility] #[id number]   12/31/2019 12/31/2019 fentanyl 25 mcg/hr patch  10 30 Diana Solano M Insurance [facility] #[id number]   12/31/2019 12/31/2019 oxycodone-acetaminophen  mg tab  120 20 Diana Solano M Insurance [facility] #[id number]   12/03/2019 12/03/2019 oxycodone-acetaminophen  mg tab  120 20 FarrisTrevon adleria NP Insurance [facility] #[id number]   12/03/2019 12/03/2019 fentanyl 25 mcg/hr patch  10 30 Farris, Nadine NP Insurance [facility] #[id number]   11/25/2019 11/25/2019 lorazepam 2 mg tablet  90 30 Chetan Montana DO Insurance [facility] #[id number]   10/24/2019 11/06/2019 oxycodone-acetaminophen  mg tab  90 30 Farris, Nadine NP Insurance [facility] #[id number]   10/24/2019 10/26/2019 fentanyl 25 mcg/hr patch  10 30 Farris, Nadine NP Insurance [facility] #[id number]   10/21/2019 10/21/2019 lorazepam 2 mg tablet  90 30 Chetan Montana DO Insurance [facility] #[id number]   10/08/2019 10/08/2019 oxycodone-acetaminophen  mg tab

## 2020-07-27 NOTE — DISCHARGE NOTE PROVIDER - CARE PROVIDER_API CALL
Chetan Montana  [address] [place] [address] [address]  [place]  [address]  Phone: [phone]  Fax: (   )    -  Follow Up Time:     Vladimir Alicea  NEUROLOGY  [address] [facility], [address]  [place] [address]  Phone: [phone]  Fax: [phone]  Follow Up Time:

## 2020-07-27 NOTE — PROGRESS NOTE ADULT - PROBLEM SELECTOR PROBLEM 8
H/O aortic aneurysm

## 2020-07-27 NOTE — PROGRESS NOTE ADULT - PROBLEM SELECTOR PLAN 10
- c/w eliquis 5 mg po bid

## 2020-10-27 ENCOUNTER — INPATIENT (INPATIENT)
Facility: HOSPITAL | Age: 70
LOS: 2 days | Discharge: HOME | End: 2020-10-30
Attending: HOSPITALIST | Admitting: HOSPITALIST
Payer: MEDICARE

## 2020-10-27 VITALS
RESPIRATION RATE: 18 BRPM | SYSTOLIC BLOOD PRESSURE: 136 MMHG | DIASTOLIC BLOOD PRESSURE: 89 MMHG | TEMPERATURE: 98 F | OXYGEN SATURATION: 100 % | HEART RATE: 58 BPM

## 2020-10-27 DIAGNOSIS — E03.9 HYPOTHYROIDISM, UNSPECIFIED: ICD-10-CM

## 2020-10-27 DIAGNOSIS — D64.9 ANEMIA, UNSPECIFIED: ICD-10-CM

## 2020-10-27 DIAGNOSIS — Z90.710 ACQUIRED ABSENCE OF BOTH CERVIX AND UTERUS: Chronic | ICD-10-CM

## 2020-10-27 DIAGNOSIS — Z98.890 OTHER SPECIFIED POSTPROCEDURAL STATES: Chronic | ICD-10-CM

## 2020-10-27 DIAGNOSIS — Z90.79 ACQUIRED ABSENCE OF OTHER GENITAL ORGAN(S): Chronic | ICD-10-CM

## 2020-10-27 DIAGNOSIS — R41.82 ALTERED MENTAL STATUS, UNSPECIFIED: ICD-10-CM

## 2020-10-27 DIAGNOSIS — I48.91 UNSPECIFIED ATRIAL FIBRILLATION: ICD-10-CM

## 2020-10-27 DIAGNOSIS — I10 ESSENTIAL (PRIMARY) HYPERTENSION: ICD-10-CM

## 2020-10-27 DIAGNOSIS — Z98.51 TUBAL LIGATION STATUS: Chronic | ICD-10-CM

## 2020-10-27 DIAGNOSIS — Z98.49 CATARACT EXTRACTION STATUS, UNSPECIFIED EYE: Chronic | ICD-10-CM

## 2020-10-27 DIAGNOSIS — F32.9 MAJOR DEPRESSIVE DISORDER, SINGLE EPISODE, UNSPECIFIED: ICD-10-CM

## 2020-10-27 DIAGNOSIS — Z98.891 HISTORY OF UTERINE SCAR FROM PREVIOUS SURGERY: Chronic | ICD-10-CM

## 2020-10-27 DIAGNOSIS — Z29.9 ENCOUNTER FOR PROPHYLACTIC MEASURES, UNSPECIFIED: ICD-10-CM

## 2020-10-27 LAB
ALBUMIN SERPL ELPH-MCNC: 4.3 G/DL — SIGNIFICANT CHANGE UP (ref 3.3–5)
ALP SERPL-CCNC: 92 U/L — SIGNIFICANT CHANGE UP (ref 40–120)
ALT FLD-CCNC: 9 U/L — SIGNIFICANT CHANGE UP (ref 4–33)
AMPHET UR-MCNC: NEGATIVE — SIGNIFICANT CHANGE UP
ANION GAP SERPL CALC-SCNC: 12 MMO/L — SIGNIFICANT CHANGE UP (ref 7–14)
APAP SERPL-MCNC: < 15 UG/ML — LOW (ref 15–25)
APPEARANCE UR: CLEAR — SIGNIFICANT CHANGE UP
APTT BLD: 38 SEC — HIGH (ref 27–36.3)
AST SERPL-CCNC: 12 U/L — SIGNIFICANT CHANGE UP (ref 4–32)
BACTERIA # UR AUTO: NEGATIVE — SIGNIFICANT CHANGE UP
BARBITURATES UR SCN-MCNC: NEGATIVE — SIGNIFICANT CHANGE UP
BASE EXCESS BLDV CALC-SCNC: 3.8 MMOL/L — SIGNIFICANT CHANGE UP
BASOPHILS # BLD AUTO: 0.03 K/UL — SIGNIFICANT CHANGE UP (ref 0–0.2)
BASOPHILS NFR BLD AUTO: 0.5 % — SIGNIFICANT CHANGE UP (ref 0–2)
BENZODIAZ UR-MCNC: NEGATIVE — SIGNIFICANT CHANGE UP
BILIRUB SERPL-MCNC: 0.3 MG/DL — SIGNIFICANT CHANGE UP (ref 0.2–1.2)
BILIRUB UR-MCNC: NEGATIVE — SIGNIFICANT CHANGE UP
BLOOD GAS VENOUS - CREATININE: 0.81 MG/DL — SIGNIFICANT CHANGE UP (ref 0.5–1.3)
BLOOD UR QL VISUAL: NEGATIVE — SIGNIFICANT CHANGE UP
BUN SERPL-MCNC: 15 MG/DL — SIGNIFICANT CHANGE UP (ref 7–23)
CALCIUM SERPL-MCNC: 9.4 MG/DL — SIGNIFICANT CHANGE UP (ref 8.4–10.5)
CANNABINOIDS UR-MCNC: NEGATIVE — SIGNIFICANT CHANGE UP
CHLORIDE BLDV-SCNC: 103 MMOL/L — SIGNIFICANT CHANGE UP (ref 96–108)
CHLORIDE SERPL-SCNC: 102 MMOL/L — SIGNIFICANT CHANGE UP (ref 98–107)
CO2 SERPL-SCNC: 27 MMOL/L — SIGNIFICANT CHANGE UP (ref 22–31)
COCAINE METAB.OTHER UR-MCNC: NEGATIVE — SIGNIFICANT CHANGE UP
COLOR SPEC: YELLOW — SIGNIFICANT CHANGE UP
CREAT SERPL-MCNC: 0.72 MG/DL — SIGNIFICANT CHANGE UP (ref 0.5–1.3)
EOSINOPHIL # BLD AUTO: 0.15 K/UL — SIGNIFICANT CHANGE UP (ref 0–0.5)
EOSINOPHIL NFR BLD AUTO: 2.7 % — SIGNIFICANT CHANGE UP (ref 0–6)
ETHANOL BLD-MCNC: < 10 MG/DL — SIGNIFICANT CHANGE UP
FOLATE SERPL-MCNC: > 20 NG/ML — HIGH (ref 4.7–20)
GAS PNL BLDV: 141 MMOL/L — SIGNIFICANT CHANGE UP (ref 136–146)
GLUCOSE BLDV-MCNC: 123 MG/DL — HIGH (ref 70–99)
GLUCOSE SERPL-MCNC: 121 MG/DL — HIGH (ref 70–99)
GLUCOSE UR-MCNC: NEGATIVE — SIGNIFICANT CHANGE UP
HCO3 BLDV-SCNC: 27 MMOL/L — SIGNIFICANT CHANGE UP (ref 20–27)
HCT VFR BLD CALC: 35.5 % — SIGNIFICANT CHANGE UP (ref 34.5–45)
HCT VFR BLDV CALC: 35.4 % — SIGNIFICANT CHANGE UP (ref 34.5–45)
HCYS SERPL-MCNC: 7.9 UMOL/L — SIGNIFICANT CHANGE UP
HGB BLD-MCNC: 10.8 G/DL — LOW (ref 11.5–15.5)
HGB BLDV-MCNC: 11.5 G/DL — SIGNIFICANT CHANGE UP (ref 11.5–15.5)
IMM GRANULOCYTES NFR BLD AUTO: 0.4 % — SIGNIFICANT CHANGE UP (ref 0–1.5)
INR BLD: 1.54 — HIGH (ref 0.88–1.16)
KETONES UR-MCNC: NEGATIVE — SIGNIFICANT CHANGE UP
LACTATE BLDV-MCNC: 2.5 MMOL/L — HIGH (ref 0.5–2)
LEUKOCYTE ESTERASE UR-ACNC: NEGATIVE — SIGNIFICANT CHANGE UP
LYMPHOCYTES # BLD AUTO: 1.28 K/UL — SIGNIFICANT CHANGE UP (ref 1–3.3)
LYMPHOCYTES # BLD AUTO: 23.4 % — SIGNIFICANT CHANGE UP (ref 13–44)
MCHC RBC-ENTMCNC: 30.4 % — LOW (ref 32–36)
MCHC RBC-ENTMCNC: 30.5 PG — SIGNIFICANT CHANGE UP (ref 27–34)
MCV RBC AUTO: 100.3 FL — HIGH (ref 80–100)
METHADONE UR-MCNC: NEGATIVE — SIGNIFICANT CHANGE UP
MONOCYTES # BLD AUTO: 0.6 K/UL — SIGNIFICANT CHANGE UP (ref 0–0.9)
MONOCYTES NFR BLD AUTO: 11 % — SIGNIFICANT CHANGE UP (ref 2–14)
NEUTROPHILS # BLD AUTO: 3.39 K/UL — SIGNIFICANT CHANGE UP (ref 1.8–7.4)
NEUTROPHILS NFR BLD AUTO: 62 % — SIGNIFICANT CHANGE UP (ref 43–77)
NITRITE UR-MCNC: NEGATIVE — SIGNIFICANT CHANGE UP
NRBC # FLD: 0 K/UL — SIGNIFICANT CHANGE UP (ref 0–0)
NT-PROBNP SERPL-SCNC: 655.3 PG/ML — SIGNIFICANT CHANGE UP
OPIATES UR-MCNC: NEGATIVE — SIGNIFICANT CHANGE UP
OXYCODONE UR-MCNC: POSITIVE — HIGH
PCO2 BLDV: 58 MMHG — HIGH (ref 41–51)
PCP UR-MCNC: POSITIVE — SIGNIFICANT CHANGE UP
PH BLDV: 7.33 PH — SIGNIFICANT CHANGE UP (ref 7.32–7.43)
PH UR: 6 — SIGNIFICANT CHANGE UP (ref 5–8)
PLATELET # BLD AUTO: 329 K/UL — SIGNIFICANT CHANGE UP (ref 150–400)
PMV BLD: 10.5 FL — SIGNIFICANT CHANGE UP (ref 7–13)
PO2 BLDV: 67 MMHG — HIGH (ref 35–40)
POTASSIUM BLDV-SCNC: 3.5 MMOL/L — SIGNIFICANT CHANGE UP (ref 3.4–4.5)
POTASSIUM SERPL-MCNC: 3.5 MMOL/L — SIGNIFICANT CHANGE UP (ref 3.5–5.3)
POTASSIUM SERPL-SCNC: 3.5 MMOL/L — SIGNIFICANT CHANGE UP (ref 3.5–5.3)
PROT SERPL-MCNC: 7.2 G/DL — SIGNIFICANT CHANGE UP (ref 6–8.3)
PROT UR-MCNC: 20 — SIGNIFICANT CHANGE UP
PROTHROM AB SERPL-ACNC: 17.4 SEC — HIGH (ref 10.6–13.6)
RBC # BLD: 3.54 M/UL — LOW (ref 3.8–5.2)
RBC # FLD: 13.8 % — SIGNIFICANT CHANGE UP (ref 10.3–14.5)
RBC CASTS # UR COMP ASSIST: SIGNIFICANT CHANGE UP (ref 0–?)
SALICYLATES SERPL-MCNC: < 5 MG/DL — LOW (ref 15–30)
SAO2 % BLDV: 91.2 % — HIGH (ref 60–85)
SARS-COV-2 RNA SPEC QL NAA+PROBE: SIGNIFICANT CHANGE UP
SODIUM SERPL-SCNC: 141 MMOL/L — SIGNIFICANT CHANGE UP (ref 135–145)
SP GR SPEC: > 1.04 — HIGH (ref 1–1.04)
SQUAMOUS # UR AUTO: SIGNIFICANT CHANGE UP
T3 SERPL-MCNC: 67.8 NG/DL — LOW (ref 80–200)
T4 FREE SERPL-MCNC: 1.43 NG/DL — SIGNIFICANT CHANGE UP (ref 0.9–1.8)
TROPONIN T, HIGH SENSITIVITY: < 6 NG/L — SIGNIFICANT CHANGE UP (ref ?–14)
TSH SERPL-MCNC: 0.16 UIU/ML — LOW (ref 0.27–4.2)
TSH SERPL-MCNC: 0.27 UIU/ML — SIGNIFICANT CHANGE UP (ref 0.27–4.2)
UROBILINOGEN FLD QL: NORMAL — SIGNIFICANT CHANGE UP
VIT B12 SERPL-MCNC: 950 PG/ML — HIGH (ref 200–900)
WBC # BLD: 5.47 K/UL — SIGNIFICANT CHANGE UP (ref 3.8–10.5)
WBC # FLD AUTO: 5.47 K/UL — SIGNIFICANT CHANGE UP (ref 3.8–10.5)
WBC UR QL: SIGNIFICANT CHANGE UP (ref 0–?)

## 2020-10-27 PROCEDURE — 99223 1ST HOSP IP/OBS HIGH 75: CPT

## 2020-10-27 PROCEDURE — 73030 X-RAY EXAM OF SHOULDER: CPT | Mod: 26,LT

## 2020-10-27 PROCEDURE — 99222 1ST HOSP IP/OBS MODERATE 55: CPT | Mod: GC

## 2020-10-27 PROCEDURE — 99284 EMERGENCY DEPT VISIT MOD MDM: CPT

## 2020-10-27 RX ORDER — ASPIRIN/CALCIUM CARB/MAGNESIUM 324 MG
81 TABLET ORAL DAILY
Refills: 0 | Status: DISCONTINUED | OUTPATIENT
Start: 2020-10-27 | End: 2020-10-30

## 2020-10-27 RX ORDER — FUROSEMIDE 40 MG
40 TABLET ORAL DAILY
Refills: 0 | Status: DISCONTINUED | OUTPATIENT
Start: 2020-10-27 | End: 2020-10-27

## 2020-10-27 RX ORDER — LANOLIN ALCOHOL/MO/W.PET/CERES
3 CREAM (GRAM) TOPICAL ONCE
Refills: 0 | Status: COMPLETED | OUTPATIENT
Start: 2020-10-27 | End: 2020-10-27

## 2020-10-27 RX ORDER — LAMOTRIGINE 25 MG/1
300 TABLET, ORALLY DISINTEGRATING ORAL AT BEDTIME
Refills: 0 | Status: DISCONTINUED | OUTPATIENT
Start: 2020-10-27 | End: 2020-10-30

## 2020-10-27 RX ORDER — SODIUM CHLORIDE 9 MG/ML
1000 INJECTION INTRAMUSCULAR; INTRAVENOUS; SUBCUTANEOUS
Refills: 0 | Status: DISCONTINUED | OUTPATIENT
Start: 2020-10-27 | End: 2020-10-27

## 2020-10-27 RX ORDER — GABAPENTIN 400 MG/1
300 CAPSULE ORAL THREE TIMES A DAY
Refills: 0 | Status: DISCONTINUED | OUTPATIENT
Start: 2020-10-27 | End: 2020-10-30

## 2020-10-27 RX ORDER — DULOXETINE HYDROCHLORIDE 30 MG/1
60 CAPSULE, DELAYED RELEASE ORAL DAILY
Refills: 0 | Status: DISCONTINUED | OUTPATIENT
Start: 2020-10-27 | End: 2020-10-30

## 2020-10-27 RX ORDER — APIXABAN 2.5 MG/1
5 TABLET, FILM COATED ORAL EVERY 12 HOURS
Refills: 0 | Status: DISCONTINUED | OUTPATIENT
Start: 2020-10-27 | End: 2020-10-30

## 2020-10-27 RX ORDER — CARVEDILOL PHOSPHATE 80 MG/1
12.5 CAPSULE, EXTENDED RELEASE ORAL EVERY 12 HOURS
Refills: 0 | Status: DISCONTINUED | OUTPATIENT
Start: 2020-10-27 | End: 2020-10-30

## 2020-10-27 RX ORDER — CARVEDILOL PHOSPHATE 80 MG/1
25 CAPSULE, EXTENDED RELEASE ORAL EVERY 12 HOURS
Refills: 0 | Status: DISCONTINUED | OUTPATIENT
Start: 2020-10-27 | End: 2020-10-27

## 2020-10-27 RX ORDER — LEVETIRACETAM 250 MG/1
500 TABLET, FILM COATED ORAL
Refills: 0 | Status: DISCONTINUED | OUTPATIENT
Start: 2020-10-27 | End: 2020-10-30

## 2020-10-27 RX ORDER — ATORVASTATIN CALCIUM 80 MG/1
40 TABLET, FILM COATED ORAL AT BEDTIME
Refills: 0 | Status: DISCONTINUED | OUTPATIENT
Start: 2020-10-27 | End: 2020-10-30

## 2020-10-27 RX ORDER — SODIUM CHLORIDE 9 MG/ML
1000 INJECTION INTRAMUSCULAR; INTRAVENOUS; SUBCUTANEOUS
Refills: 0 | Status: DISCONTINUED | OUTPATIENT
Start: 2020-10-27 | End: 2020-10-30

## 2020-10-27 RX ORDER — LEVOTHYROXINE SODIUM 125 MCG
175 TABLET ORAL DAILY
Refills: 0 | Status: DISCONTINUED | OUTPATIENT
Start: 2020-10-27 | End: 2020-10-30

## 2020-10-27 RX ADMIN — SODIUM CHLORIDE 75 MILLILITER(S): 9 INJECTION INTRAMUSCULAR; INTRAVENOUS; SUBCUTANEOUS at 18:38

## 2020-10-27 RX ADMIN — LEVETIRACETAM 500 MILLIGRAM(S): 250 TABLET, FILM COATED ORAL at 18:34

## 2020-10-27 RX ADMIN — DULOXETINE HYDROCHLORIDE 60 MILLIGRAM(S): 30 CAPSULE, DELAYED RELEASE ORAL at 12:48

## 2020-10-27 RX ADMIN — APIXABAN 5 MILLIGRAM(S): 2.5 TABLET, FILM COATED ORAL at 18:34

## 2020-10-27 RX ADMIN — CARVEDILOL PHOSPHATE 12.5 MILLIGRAM(S): 80 CAPSULE, EXTENDED RELEASE ORAL at 18:34

## 2020-10-27 RX ADMIN — LAMOTRIGINE 300 MILLIGRAM(S): 25 TABLET, ORALLY DISINTEGRATING ORAL at 22:39

## 2020-10-27 RX ADMIN — Medication 3 MILLIGRAM(S): at 23:07

## 2020-10-27 RX ADMIN — SODIUM CHLORIDE 75 MILLILITER(S): 9 INJECTION INTRAMUSCULAR; INTRAVENOUS; SUBCUTANEOUS at 10:52

## 2020-10-27 RX ADMIN — Medication 175 MICROGRAM(S): at 10:51

## 2020-10-27 RX ADMIN — Medication 81 MILLIGRAM(S): at 12:48

## 2020-10-27 RX ADMIN — GABAPENTIN 300 MILLIGRAM(S): 400 CAPSULE ORAL at 18:36

## 2020-10-27 RX ADMIN — ATORVASTATIN CALCIUM 40 MILLIGRAM(S): 80 TABLET, FILM COATED ORAL at 22:39

## 2020-10-27 NOTE — ED ADULT NURSE NOTE - OBJECTIVE STATEMENT
69 yo F AAOx0 received to room 5 for AMS, pt nonverbal, minimally responsive to verbal and tactile stimuli, unable to elaborate on medical hx or current complaints, FS as charted, #20g placed to RAC, VSS, MD Guajardo and Rober at bedside, pending CT and Xray at this time

## 2020-10-27 NOTE — SWALLOW BEDSIDE ASSESSMENT ADULT - COMMENTS
H&P: 70 year old woman with HTN,hypothyroidism, atrial fibrillation (on eliquis), HLD, RA, OA, SLE, lumbar spinal stenosis, congenital hip deformation (AVN of left hip, s/p R hip replacement), uterine cancer (s/p EVELYN/?BSO in 2006), s/p AVR/ascending aortic arch aneurysm repair in 2/2020, CVA with L sided weakness (3/2020), anxiety, depression presents with altered mental status, slurred speech and L sided weakness. Admit to telemetry for r/o TIA vs CVA.    CXR 10/27/20: Mild pulmonary edema.  CTH 10/27/20: No acute intracranial hemorrhage or mass effect.    Patient was seen upright at bedside with nasal canula in place. Patient was alert/awake and verbally responsive to simple questions. Patient able to follow simple directions. Patient endorses sensation of solids (i.e. chicken) "getting stuck" in mid chest/ upper sternum region. Patient denies oropharyngeal dysphagia symptoms.

## 2020-10-27 NOTE — BEHAVIORAL HEALTH ASSESSMENT NOTE - NS ED BHA COCAINE
None known
REFUSED out of bed portion of exam ,did NOT want to dangle BLEs dependent position due to pain

## 2020-10-27 NOTE — PROVIDER CONTACT NOTE (OTHER) - BACKGROUND
71 yo woman presents to [facility] ED with altered mental status, slurred speech and L sided weakness.

## 2020-10-27 NOTE — BEHAVIORAL HEALTH ASSESSMENT NOTE - RISK ASSESSMENT
Low Acute Suicide Risk Pt has a history of anxiety and depression, well controlled on medication, Pt no acute risk to her self or others.

## 2020-10-27 NOTE — SWALLOW BEDSIDE ASSESSMENT ADULT - SWALLOW EVAL: RECOMMENDED FEEDING/EATING TECHNIQUES
small sips/bites/maintain upright posture during/after eating for 30 mins/oral hygiene/position upright (90 degrees)/alternate food with liquid

## 2020-10-27 NOTE — BEHAVIORAL HEALTH ASSESSMENT NOTE - NSBHCONSULTFOLLOWDETAILS_PSY_A_CORE FT
Pt controlled on her medication for her anxiety and depression. pt is of no acute risk to her self or others and states that she does not think she needs to speak to psychiatry. If anything changes with the patient re-consult psych.

## 2020-10-27 NOTE — BEHAVIORAL HEALTH ASSESSMENT NOTE - OTHER
A friend decreased strength in the left arm due to past stroke and decreased strength in the left leg due to "bad hip" unable to assess

## 2020-10-27 NOTE — SWALLOW BEDSIDE ASSESSMENT ADULT - ADDITIONAL RECOMMENDATIONS
1. Monitor for PO tolerance/intake  2. Monitor for any change in neurological status that may impact oral intake  3. Consideration for GI consultation at MD's discretion given patient offers c/o solids "getting stuck" in mid chest/ upper sternum region

## 2020-10-27 NOTE — H&P ADULT - PROBLEM SELECTOR PLAN 5
- DVT ppx lovenox SQ daily - c/w synthroid  - check TSH [id number] Score 5, chronic afib rate controlled now  -c/w coreg and eliquis  -check thyroid panel   -monitor lytes and replete as needed.

## 2020-10-27 NOTE — H&P ADULT - ATTENDING COMMENTS
Pt seen and examined. Pt seen and examined. Pt reports sitting on a chair and having word finding difficulty. Per pt, her daughter told to her she was slurring her words. Currently she denies headache, fever, chills, nausea or vomiting. Pt is alert and oriented x 2-3    Encephalopathy; unclear etiology, cannot rule out CVA vs seizure. Will attempt to verify if aortic valve ( albeit it's likley prosthetic) and Hip replacement is compatible with MRI. PEr Felipe MRI supervisor, Hip should be compatible. EEG. b12 and folate wnl. Of note, pt's UTOx +oxy and PCP. Pt and daughter denies PCP use. Lamictal is known to cause false positive PCP. Hold lasix, pt dry on exam, will hydrate x 24hrs and re-eval, probNP low    Seizure: c/w antiepileptics, f/u keppra level.     Chronic afib: c/w rate control and anticoagulation    Chronic Pain syndrome: c/w pain control Pt seen and examined. Pt reports sitting on a chair and having word finding difficulty. Per pt, her daughter told to her she was slurring her words. Currently she denies headache, fever, chills, nausea or vomiting. Pt is alert and oriented x 2-3    Encephalopathy; unclear etiology, cannot rule out CVA vs seizure. Will attempt to verify if aortic valve ( albeit it's likley prosthetic) and Hip replacement is compatible with MRI. PEr Felipe MRI supervisor, Hip should be compatible. EEG. b12 and folate wnl. Of note, pt's UTOx +oxy and PCP. Pt and daughter denies PCP use. Lamictal is known to cause false positive PCP. Hold lasix, pt dry on exam, will hydrate x 24hrs and re-eval, probNP low    Seizure: c/w antiepileptics, f/u keppra level.     Chronic afib: will decrease coreg dose to 12.5mg BID given episodes of bradycardia. c/w anticoagulation    Chronic Pain syndrome: c/w pain control

## 2020-10-27 NOTE — PROVIDER CONTACT NOTE (OTHER) - ASSESSMENT
Larissa confirmed that she lives with pt at friends house and that they will be moving by the end of the year.  Daughter explained that she contacted 911 after her mother slid down the chair and was unable to form words.  Daughter informed that pt has a hx of stroke and brain seizures.  Daughter stated that pt has increased short term memory loss and confusion after her open heart surgery in february-neurologist (Dr. Navarrete) is aware. Larissa confirmed that mother utilizes cane, walker at home and wheel chair in community.  Larissa reported pt displays increased agitation, depression, and some anger- SW will email ([email or web address]) therapist referrals.  Larissa states no other resources are needed at this time.

## 2020-10-27 NOTE — H&P ADULT - PROBLEM SELECTOR PLAN 3
[id number] Score 5, chronic afib rate controlled now  -c/w coreg and eliquis  -check thyroid panel   -monitor lytes and replete as needed. Patient with h/o hypertension and is on coreg  -Continue home medications  -DASH diet  -Monitor blood pressure No h/o bleeding. No melena, hemoptysis, hematochezia, epistaxis.   pending Vit B12, Folic acid level No h/o bleeding. No melena, hemoptysis, hematochezia, epistaxis.   -pending Vit B12, Folic acid level

## 2020-10-27 NOTE — H&P ADULT - PROBLEM SELECTOR PLAN 6
- DVT ppx lovenox SQ daily - DVT ppx on eliquis - DVT ppx on eliquis  - seizure precaution, fall precaution, risk for aspiration - c/w synthroid  - check TSH

## 2020-10-27 NOTE — ED PROVIDER NOTE - PHYSICAL EXAMINATION
Pedro BARRAZA MD PGY3:   PHYSICAL EXAM:    GENERAL: NAD, well-developed  HEENT:  Atraumatic, Normocephalic  CHEST/LUNG: Chest rise equal bilaterally. CTAB.  HEART: Regular rate and rhythm. No murmurs or rubs,   ABDOMEN: Soft, Nontender, Nondistended  EXTREMITIES:  2+ Peripheral Pulses.  PSYCH: Responds to pain and follows commands when stimulated by pain but not speaking and very quickly re-descends into nonresponsiveness.   SKIN: Small L back contusion.

## 2020-10-27 NOTE — BEHAVIORAL HEALTH ASSESSMENT NOTE - CASE SUMMARY
Chart reviewed. Pt seen with LUIS gordon. Also discussed case with primary team in ED. Pt emerging from stuporous state that led to her admission. She is AA Ox 3, without psychiatric distress or focal neuro deficits. Undergoing neuro eval, cooperative with treatment/eval, etc. She admits to chronic depression, anxiety, and pain, but denies any recent or current safety concerns or severe sx of depression, hopelessness, SI, HI, psychosis, jenni, or panic/anxiety. She feels comfortable continuing Lamictal, Cymbalta, and Ativan as prescribed. She denies any concerns with overuse of Percocet (reports approx 2 tabs/day). She denies any other illicit substance use or safety concerns at home.   We did not detect any psychiatric contraindication to discharge home with usual providers when medically cleared. Given low current psych acuity, will sign off but please call us as needed.

## 2020-10-27 NOTE — BEHAVIORAL HEALTH ASSESSMENT NOTE - SUMMARY
70yr old female domiciles at a friends house where her daughter also comes to take care of her. pt has a significant past psychiatric history of anxiety and depression. and a past medical history of  hypothyroidism, atrial fibrillation HLD, RA, OA, SLE, lumbar spinal stenosis, uterine cancer s/p AVR/ascending aortic arch aneurysm repair in february of 2020 presents to the ED after her daughter noticed that she had slurred speech. psych consulted for the patient. pt seen at bedside pt states that she is doing well and that she had a stroke 1 month prior to her open heart surgery that resulted in her having chronic left arm weakness. pt states that she also has significant weakness in her left leg however, she states that is because she needs a left hip replacement. Pt states that she then had a TIA 2weeks following that stroke. Pt is comfortably laying in bed and has no acute complaints. 70yr old female domiciled at a friends house where her daughter also comes to take care of her. pt has a significant past psychiatric history of anxiety and depression. and a past medical history of  hypothyroidism, atrial fibrillation HLD, RA, OA, SLE, lumbar spinal stenosis, uterine cancer s/p AVR/ascending aortic arch aneurysm repair in february of 2020 presents to the ED after her daughter noticed that she had slurred speech. psych consulted for the patient. pt seen at bedside pt states that she is doing well and that she had a stroke 1 month prior to her open heart surgery that resulted in her having chronic left arm weakness. pt states that she also has significant weakness in her left leg however, she states that is because she needs a left hip replacement. Pt states that she then had a TIA 2weeks following that stroke. Pt is comfortably laying in bed and has no acute complaints.

## 2020-10-27 NOTE — H&P ADULT - PROBLEM SELECTOR PLAN 4
- on Cymbalta, will continue  - pending psych consult [id number] Score 5, chronic afib rate controlled now  -c/w coreg and eliquis  -check thyroid panel   -monitor lytes and replete as needed. Patient with h/o hypertension and is on coreg  -Continue home medications  -DASH diet  -Monitor blood pressure

## 2020-10-27 NOTE — H&P ADULT - ASSESSMENT
70 year old woman with HTN,hypothyroidism, atrial fibrillation (on eliquis), HLD, RA, OA, SLE, lumbar spinal stenosis, congenital hip deformation (AVN of left hip, s/p R hip replacement), uterine cancer (s/p EVELYN/?BSO in 2006), s/p AVR/ascending aortic arch aneurysm repair in 2/2020, CVA with L sided weakness (3/2020), anxiety, depression presents with altered mental status, slurred speech and L sided weakness. Admit to telemetry for r/o TIA vs CVA.

## 2020-10-27 NOTE — H&P ADULT - PROBLEM SELECTOR PLAN 1
associated with slurred speech and L sided weakness r/o CVA vs TIA vs seizure/post-ictal, toxic metabolic encephalopathy, catatonia.   -CTH noncon demonstrates old R parieto-temporal infarct.  -CTA H & N without aneurysm, hemorrhage, infarct, w/o LVO. Not a candidate for mechanical thrombectomy or TPA  -neurology consulted,reccs appreciated  -psych consult  -c/w eliquis 5mg bid  - keppra level pending  - video EEG  - vit B12, folate, ammonia, methylmalonic acid, homocysteine, TSH, heavy metal tox screen, UA, urine tox screen  -MRI head w/o contrast associated with slurred speech and L sided weakness r/o CVA vs TIA vs seizure/post-ictal, toxic metabolic encephalopathy, catatonia.   -CTH noncon demonstrates old R parieto-temporal infarct.  -CTA H & N without aneurysm, hemorrhage, infarct, w/o LVO. Not a candidate for mechanical thrombectomy or TPA  -neurology consulted,reccs appreciated  -psych consult  -c/w eliquis 5mg bid  - keppra level pending  - video EEG  - vit B12, folate, ammonia, methylmalonic acid, homocysteine, TSH, heavy metal tox screen, UA, urine tox screen positive oxycodone and phecyclidin  -per daughter, patient R hip replacement (metal), will order MRI head w/o contrast if not contraindicated associated with slurred speech and L sided weakness r/o CVA vs TIA vs toxic metabolic encephalopathy, vsseizure/post-ictal vs catatonia.   -CTH noncon demonstrates old R parieto-temporal infarct.  -CTA H & N without aneurysm, hemorrhage, infarct, w/o LVO. Not a candidate for mechanical thrombectomy or TPA  -neurology consulted,reccs appreciated  -psych consult  -c/w eliquis 5mg bid  - keppra level pending  - video EEG  - vit B12, folate, ammonia, methylmalonic acid, homocysteine, TSH, heavy metal tox screen, UA, urine tox screen positive oxycodone and phecyclidin  -per daughter, patient R hip replacement (metal), will order MRI head w/o contrast if not contraindicated associated with slurred speech and L sided weakness r/o CVA vs TIA vs toxic metabolic encephalopathy, vs seizure/post-ictal vs catatonia.   -CTH noncon demonstrates old R parieto-temporal infarct.  -CTA H & N without aneurysm, hemorrhage, infarct, w/o LVO. Not a candidate for mechanical thrombectomy or TPA  -neurology consulted,reccs appreciated  -psych consult  -c/w eliquis 5mg bid  - keppra level pending  - video EEG  - vit B12, folate, ammonia, methylmalonic acid, homocysteine, TSH, heavy metal tox screen, UA, urine tox screen positive oxycodone and phecyclidin  -per daughter, patient R hip replacement (metal), will order MRI head w/o contrast if not contraindicated associated with slurred speech and L sided weakness r/o CVA vs TIA vs toxic metabolic encephalopathy, vs seizure/post-ictal vs catatonia.   -CTH noncon demonstrates old R parieto-temporal infarct.  -CTA H & N without aneurysm, hemorrhage, infarct, w/o LVO. Not a candidate for mechanical thrombectomy or TPA  -neurology consulted,reccs appreciated  -psych consult  -c/w eliquis 5mg bid  - keppra level pending  - video EEG  - vit B12, folate, ammonia, methylmalonic acid, homocysteine, TSH, heavy metal tox screen, UA, urine tox screen positive oxycodone and phecyclidin  -per daughter, patient R hip replacement (metal) and prosthetic heart valve, will order MRI head w/o contrast if not contraindicated associated with slurred speech and L sided weakness r/o CVA vs TIA vs toxic metabolic encephalopathy, vs seizure/post-ictal vs catatonia.   -CTH noncon demonstrates old R parieto-temporal infarct.  -CTA H & N without aneurysm, hemorrhage, infarct, w/o LVO. Not a candidate for mechanical thrombectomy or TPA  -urine tox screen positive oxycodone and phecyclidin  -neurology consulted,reccs appreciated  -psych consult  -c/w eliquis 5mg bid  - keppra level pending  - video EEG  - vit B12, folate, ammonia, methylmalonic acid, homocysteine, TSH, heavy metal tox screen, UA  -per daughter, patient R hip replacement (metal) and prosthetic heart valve, will order MRI head w/o contrast if not contraindicated  - UA w/ elev specifity gravity ,gentle IVF x12 hours, hold lasix for today and reassess tomorrow

## 2020-10-27 NOTE — CHART NOTE - NSCHARTNOTEFT_GEN_A_CORE
Patient Name: Gia Harris     YOB: 1950       Address: [address] [place] [address]     Sex: Female                     Rx Written    Rx Dispensed    Drug    Quantity    Days Supply    Prescriber Name              04/21/2020 05/03/2020 fentanyl 25 mcg/hr patch  10 30 Cora Cho (NP)     Payment Method Insurance  Dispenser [facility] #[id number]     03/31/2020 03/31/2020 oxycodone-acetaminophen  mg tab  120 20 Deshawn Ba DO     Payment Method Insurance  Dispenser [facility] #[id number]     01/30/2020 01/30/2020 oxycodone-acetaminophen  mg tab  120 20 [place], Alexandre     Payment Method Insurance  Dispenser [facility] #[id number]     01/08/2020 01/08/2020 lorazepam 2 mg tablet  90 30 Chetan Montana DO     Payment Method Insurance  Dispenser [facility] #[id number]     01/03/2020 01/04/2020 lorazepam 2 mg tablet  14 5 Chetan Montana DO     Payment Method Insurance  Dispenser [facility] #[id number]     12/31/2019 12/31/2019 fentanyl 25 mcg/hr patch  10 30 Diana Solano M     Payment Method Insurance  Dispenser [facility] #[id number]     12/31/2019 12/31/2019 oxycodone-acetaminophen  mg tab  120 20 Diana Solano M     Payment Method Insurance  Dispenser [facility] #[id number]     12/03/2019 12/03/2019 oxycodone-acetaminophen  mg tab  120 20 Farris, Nadine NP     Payment Method Insurance  Dispenser [facility] #[id number]     12/03/2019 12/03/2019 fentanyl 25 mcg/hr patch  10 30 Farris, Nadine NP     Payment Method Insurance  Dispenser [facility] #[id number]     11/25/2019 11/25/2019 lorazepam 2 mg tablet  90 30 Chetan Montana DO     Payment Method Insurance  Dispenser [facility] #[id number]     10/24/2019 11/06/2019 oxycodone-acetaminophen  mg tab  90 30 Farris, Nadine NP     Payment Method Insurance  Dispenser [facility] #[id number]                   Patient Name: Gia Harris     YOB: 1950       Address: [address] [place] [id number]     Sex: Female                     Rx Written    Rx Dispensed    Drug    Quantity    Days Supply    Prescriber Name              10/16/2020 10/22/2020 fentanyl 25 mcg/hr patch  10 30 Cora Cho (NP)     Payment Method Medicare  [facility] #[id number]     10/15/2020 10/15/2020 oxycodone-acetaminophen  mg tab  120 20 Cora Cho (NP)     Payment Method Medicare  [facility] #[id number]     10/14/2020 10/14/2020 lorazepam 2 mg tablet  6 2 Chetan Montana DO     Payment Method Medicare  Dispenser [facility] #[id number]     09/14/2020 09/24/2020 fentanyl 25 mcg/hr patch  10 30 Diana Solano M     Payment Method Medicare  Dispenser [facility] #[id number]     09/14/2020 09/14/2020 oxycodone-acetaminophen  mg tab  120 20 Diana Solano M     Payment Method Medicare  [facility] #[id number]     09/11/2020 09/12/2020 diphenoxylate-atropine 2.5-0.025 mg tablet  36 9 Chetan Montana DO     Payment Method Medicare  Dispenser [facility] #[id number]     09/11/2020 09/12/2020 lorazepam 2 mg tablet  90 30 MonChetan lorenzo DO     Payment Method Medicare  Dispenser [facility] #[id number]     08/07/2020 08/14/2020 oxycodone-acetaminophen  mg tab  120 20 Josselin Us (PA-C)     Payment Method Medicare  Dispenser [facility] #[id number]     08/14/2020 08/14/2020 lorazepam 2 mg tablet  90 30 Chetan Montana DO     Payment Method Medicare  Dispenser [facility] #[id number]     08/07/2020 08/12/2020 fentanyl 25 mcg/hr patch  10 30 Josselin Us (PA-C)     Payment Method Medicare  Dispenser [facility] #[id number]     07/17/2020 07/17/2020 oxycodone-acetaminophen  mg tab  120 20 Cora Cho (NP)     Payment Method Medicare  [facility] #[id number]     07/17/2020 07/17/2020 lorazepam 2 mg tablet  90 30 Chetan Montana DO     Payment Method Medicare  [facility] #[id number]     06/22/2020 07/04/2020 fentanyl 25 mcg/hr patch  10 30 Cora Cho (NP)     Payment Method Medicare  Dispenser [facility] #[id number]     06/22/2020 06/22/2020 oxycodone-acetaminophen  mg tab  120 20 Cora Cho (NP) Patient Name: Gia Harris     YOB: 1950       Address: [address] [place] [address]     Sex: Female       Rx Written	Rx Dispensed	Drug	Quantity	Days Supply	Prescriber Name	Payment Method	Dispenser  10/16/2020	10/22/2020	fentanyl 25 mcg/hr patch	10	30	Cora Cho (NP)	Medicare	[facility] #[id number]  10/15/2020	10/15/2020	oxycodone-acetaminophen  mg tab	120	20	Cora Cho (NP)	Medicare	[facility] #[id number]  10/14/2020	10/14/2020	lorazepam 2 mg tablet	6	2	Chetan Montana DO	Medicare	[facility] #[id number]

## 2020-10-27 NOTE — H&P ADULT - NSHPLABSRESULTS_GEN_ALL_CORE
LABORATORY VALUES	 	                          10.8   5.47  )-----------( 329      ( 27 Oct 2020 04:15 )             35.5       10-27    141  |  102  |  15  ----------------------------<  121<H>  3.5   |  27  |  0.72    Ca    9.4      27 Oct 2020 04:15    TPro  7.2  /  Alb  4.3  /  TBili  0.3  /  DBili  x   /  AST  12  /  ALT  9   /  AlkPhos  92  10-27    LIVER FUNCTIONS - ( 27 Oct 2020 04:15 )  Alb: 4.3 g/dL / Pro: 7.2 g/dL / ALK PHOS: 92 u/L / ALT: 9 u/L / AST: 12 u/L / GGT: x           Activated Partial Thromboplastin Time: 38.0 SEC (10-27 @ 04:15)      CARDIAC MARKERS:  Troponin T, High Sensitivity: < 6 ng/L (10-27-20 @ 04:15)    Blood Gas Venous - Lactate: 2.5 mmol/L (10-27 @ 04:15)    Thyroid Stimulating Hormone, Serum: 0.27 uIU/mL (10-27 @ 04:15)      Urinalysis Basic - ( 27 Oct 2020 05:00 )    Color: YELLOW / Appearance: CLEAR / SG: > 1.040 / pH: 6.0  Gluc: NEGATIVE / Ketone: NEGATIVE  / Bili: NEGATIVE / Urobili: NORMAL   Blood: NEGATIVE / Protein: 20 / Nitrite: NEGATIVE   Leuk Esterase: NEGATIVE / RBC: 3-5 / WBC 0-2   Sq Epi: OCC / Non Sq Epi: x / Bacteria: NEGATIVE      CAPILLARY BLOOD GLUCOSE  126 (27 Oct 2020 04:21)      POCT Blood Glucose.: 126 mg/dL (27 Oct 2020 04:19) LABORATORY VALUES	 	                          10.8   5.47  )-----------( 329      ( 27 Oct 2020 04:15 )             35.5       10-27    141  |  102  |  15  ----------------------------<  121<H>  3.5   |  27  |  0.72    Ca    9.4      27 Oct 2020 04:15    TPro  7.2  /  Alb  4.3  /  TBili  0.3  /  DBili  x   /  AST  12  /  ALT  9   /  AlkPhos  92  10-27    LIVER FUNCTIONS - ( 27 Oct 2020 04:15 )  Alb: 4.3 g/dL / Pro: 7.2 g/dL / ALK PHOS: 92 u/L / ALT: 9 u/L / AST: 12 u/L / GGT: x           Activated Partial Thromboplastin Time: 38.0 SEC (10-27 @ 04:15)      CARDIAC MARKERS:  Troponin T, High Sensitivity: < 6 ng/L (10-27-20 @ 04:15)    Blood Gas Venous - Lactate: 2.5 mmol/L (10-27 @ 04:15)    Thyroid Stimulating Hormone, Serum: 0.27 uIU/mL (10-27 @ 04:15)    Urinalysis Basic - ( 27 Oct 2020 05:00 )    Color: YELLOW / Appearance: CLEAR / SG: > 1.040 / pH: 6.0  Gluc: NEGATIVE / Ketone: NEGATIVE  / Bili: NEGATIVE / Urobili: NORMAL   Blood: NEGATIVE / Protein: 20 / Nitrite: NEGATIVE   Leuk Esterase: NEGATIVE / RBC: 3-5 / WBC 0-2   Sq Epi: OCC / Non Sq Epi: x / Bacteria: NEGATIVE    CAPILLARY BLOOD GLUCOSE  126 (27 Oct 2020 04:21)    POCT Blood Glucose.: 126 mg/dL (27 Oct 2020 04:19)    Phencyclidine Level, Urine: POSITIVE (10.27.20 @ 05:00)   oxycodone positive    < from: Xray Chest 1 View- PORTABLE-Urgent (Xray Chest 1 View- PORTABLE-Urgent .) (10.27.20 @ 04:59) >  IMPRESSION:  Mild pulmonary edema.    < from: CT Perfusion w/ Maps w/ IV Cont (10.27.20 @ 04:48) >    IMPRESSION:  CT brain perfusion: Noischemic penumbra.    CTA head and neck: Patent cervical and intracranial major arterial vasculature without evidence of abrupt vessel cut off, hemodynamically significant stenosis, aneurysm, or dissection.    < from: CT Brain Stroke Protocol (10.27.20 @ 04:33) >  IMPRESSION:  No acute intracranial hemorrhage or mass effect.  The above findings were discussed with Dr. Taylor at 4:42 AM on 10/27/2020. LABORATORY VALUES	 	                          10.8   5.47  )-----------( 329      ( 27 Oct 2020 04:15 )             35.5       10-27    141  |  102  |  15  ----------------------------<  121<H>  3.5   |  27  |  0.72    Ca    9.4      27 Oct 2020 04:15    TPro  7.2  /  Alb  4.3  /  TBili  0.3  /  DBili  x   /  AST  12  /  ALT  9   /  AlkPhos  92  10-27    LIVER FUNCTIONS - ( 27 Oct 2020 04:15 )  Alb: 4.3 g/dL / Pro: 7.2 g/dL / ALK PHOS: 92 u/L / ALT: 9 u/L / AST: 12 u/L / GGT: x           Activated Partial Thromboplastin Time: 38.0 SEC (10-27 @ 04:15)      CARDIAC MARKERS:  Troponin T, High Sensitivity: < 6 ng/L (10-27-20 @ 04:15)    Blood Gas Venous - Lactate: 2.5 mmol/L (10-27 @ 04:15)    Thyroid Stimulating Hormone, Serum: 0.27 uIU/mL (10-27 @ 04:15)    Urinalysis Basic - ( 27 Oct 2020 05:00 )    Color: YELLOW / Appearance: CLEAR / SG: > 1.040 / pH: 6.0  Gluc: NEGATIVE / Ketone: NEGATIVE  / Bili: NEGATIVE / Urobili: NORMAL   Blood: NEGATIVE / Protein: 20 / Nitrite: NEGATIVE   Leuk Esterase: NEGATIVE / RBC: 3-5 / WBC 0-2   Sq Epi: OCC / Non Sq Epi: x / Bacteria: NEGATIVE    CAPILLARY BLOOD GLUCOSE  126 (27 Oct 2020 04:21)    POCT Blood Glucose.: 126 mg/dL (27 Oct 2020 04:19)    Phencyclidine Level, Urine: POSITIVE (10.27.20 @ 05:00)   oxycodone positive    EKG: sinus bradycardia w/ 1st degree av block and prolong QTc 461    < from: Xray Chest 1 View- PORTABLE-Urgent (Xray Chest 1 View- PORTABLE-Urgent .) (10.27.20 @ 04:59) >  IMPRESSION:  Mild pulmonary edema.    < from: CT Perfusion w/ Maps w/ IV Cont (10.27.20 @ 04:48) >    IMPRESSION:  CT brain perfusion: Noischemic penumbra.    CTA head and neck: Patent cervical and intracranial major arterial vasculature without evidence of abrupt vessel cut off, hemodynamically significant stenosis, aneurysm, or dissection.    < from: CT Brain Stroke Protocol (10.27.20 @ 04:33) >  IMPRESSION:  No acute intracranial hemorrhage or mass effect.  The above findings were discussed with Dr. Taylor at 4:42 AM on 10/27/2020.

## 2020-10-27 NOTE — ED ADULT NURSE NOTE - CHIEF COMPLAINT QUOTE
pt arrives from home from [facility]. as per EMS family found patient sliding out her chair at 215am. pt started developing slurred speech at 3am. pt lethargic and weak at the moment. minimally responds to verbal stimuli. MD dash called for Stroke eval. Stroke code called. Pt bought back to CT scan. .pmh CVA.  daughter shirlene [phone]

## 2020-10-27 NOTE — SWALLOW BEDSIDE ASSESSMENT ADULT - SWALLOW EVAL: DIAGNOSIS
Patient presents with functional oropharyngeal swallow. The oral phase was marked by adequate stripping of bolus from utensil, adequate mastication for regular solids, adequate bolus manipulation and functional oral transit time. The pharyngeal phase was marked by laryngeal elevation upon digital palpation with NO overt s/s of laryngeal penetration/aspiration for puree consistency, regular solids and thin liquids.

## 2020-10-27 NOTE — BEHAVIORAL HEALTH ASSESSMENT NOTE - HPI (INCLUDE ILLNESS QUALITY, SEVERITY, DURATION, TIMING, CONTEXT, MODIFYING FACTORS, ASSOCIATED SIGNS AND SYMPTOMS)
70yr old female domiciles at a friends house where her daughter also comes to take care of her. pt has a significant past psychiatric history of anxiety and depression. and a past medical history of  hypothyroidism, atrial fibrillation HLD, RA, OA, SLE, lumbar spinal stenosis, uterine cancer s/p AVR/ascending aortic arch aneurysm repair in february of 2020 presents to the ED after her daughter noticed that she had slurred speech. psych consulted for the patient. pt seen at bedside pt states that she is doing well and that she had a stroke 1 month prior to her open heart surgery that resulted in her having chronic left arm weakness. pt states that she also has significant weakness in her left leg however, she states that is because she needs a left hip replacement. Pt states that she then had a TIA 2weeks following that stroke. Pt is comfortably laying in bed and has no acute complaints.    pt states that she is not currently anxious or depressed, and does not think that she needs to speak to psychiatry about anything. Pt states that she is not having any hallucinations or delusions. Patient denies SI or HI. 70yr old female domiciled at a friends house where her daughter also comes to take care of her. pt has a significant past psychiatric history of anxiety and depression. and a past medical history of  hypothyroidism, atrial fibrillation HLD, RA, OA, SLE, lumbar spinal stenosis, uterine cancer s/p AVR/ascending aortic arch aneurysm repair in february of 2020 presents to the ED after her daughter noticed that she had slurred speech. psych consulted for the patient. pt seen at bedside pt states that she is doing well and that she had a stroke 1 month prior to her open heart surgery that resulted in her having chronic left arm weakness. pt states that she also has significant weakness in her left leg however, she states that is because she needs a left hip replacement. Pt states that she then had a TIA 2weeks following that stroke. Pt is comfortably laying in bed and has no acute complaints.    pt states that she is not currently anxious or depressed, and does not think that she needs to speak to psychiatry about anything. Pt states that she is not having any hallucinations or delusions. Patient denies SI or HI.

## 2020-10-27 NOTE — ED PROVIDER NOTE - OBJECTIVE STATEMENT
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 Patient unable to give further history. Per sandy's daughter she has been increasingly lethargic all day and fell off her chair early this morning and wasn't responding as she usually does s/p fall. At baseline patient is talking and walks with assistance with a walker. Did not take more of her home percocet. 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 Patient unable to give further history. Per sandy's daughter she has been increasingly lethargic all day and fell off her chair early this morning and wasn't responding as she usually does s/p fall. At baseline patient is talking and walks with assistance with a walker. Did not take more of her home percocet. 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 Patient unable to give further history. Per sandy's daughter she has been increasingly lethargic all day and fell off her chair early this morning and wasn't responding as she usually does s/p fall. At baseline patient is talking and walks with assistance with a walker. Did not take more of her home percocet.    Daughter Larissa ([phone])    Medication list as per note in chart ;   percocet 10/325 PRN   Ativan 2mg TID PRN   Keppra 500mg BID  Duloxetine 60mg BID   Furosemide 40mg qday   Eliquis 5mg BID   Lamotrigine qday   Levothyroxine 175mg qday   Gabapentin 300mg BID   Carvedilol 25mg BID   Atorvastatin 40mg qday

## 2020-10-27 NOTE — CONSULT NOTE ADULT - SUBJECTIVE AND OBJECTIVE BOX
HPI: 69yo woman with history of presenting as code stroke to [facility] for slurred speech and equivocal left arm and leg weakness with unclear LWK.     (Stroke only)  NIHSS: 17  MRS: 1    REVIEW OF SYSTEMS  unable to obtain at this time	    A 10-system ROS was performed and is negative except for those items noted above and/or in the HPI.    PAST MEDICAL & SURGICAL HISTORY:    MEDICATIONS (HOME):  Home Medications:    MEDICATIONS  (STANDING):    MEDICATIONS  (PRN):    ALLERGIES/INTOLERANCES:  Allergies  No Known Allergies    VITALS & EXAMINATION:  Vital Signs Last 24 Hrs  T(C): 36.6 (27 Oct 2020 04:13), Max: 36.6 (27 Oct 2020 04:13)  T(F): 97.9 (27 Oct 2020 04:13), Max: 97.9 (27 Oct 2020 04:13)  HR: 58 (27 Oct 2020 04:13) (58 - 58)  BP: 136/89 (27 Oct 2020 04:13) (136/89 - 136/89)  BP(mean): --  RR: 18 (27 Oct 2020 04:13) (18 - 18)  SpO2: 100% (27 Oct 2020 04:13) (100% - 100%)    Neurological (>12):  MS: eyes closed, sluggishly opens eyes to voice and can show smile when asked and wiggle toes upon command, no verbal output    CNs: PERRL (R = 5mm, L = 5mm). VFF. EOMI no nystagmus, no facial asymmetry    Motor: does not keep extremities in air, but able to move them somewhat spontaneously	       STUDIES & IMAGING:  Studies (EKG, EEG, EMG, etc):     Radiology (XR, CT, MR, U/S, TTE/ELA): HPI: 71yo woman with history of presenting as code stroke to [facility] for slurred speech and equivocal left arm and leg weakness with unclear LWK.     (Stroke only)  NIHSS: 17  MRS: 1    REVIEW OF SYSTEMS  unable to obtain at this time	    A 10-system ROS was performed and is negative except for those items noted above and/or in the HPI.    PAST MEDICAL & SURGICAL HISTORY:    MEDICATIONS (HOME):  Home Medications:    MEDICATIONS  (STANDING):    MEDICATIONS  (PRN):    ALLERGIES/INTOLERANCES:  Allergies  No Known Allergies    VITALS & EXAMINATION:  Vital Signs Last 24 Hrs  T(C): 36.6 (27 Oct 2020 04:13), Max: 36.6 (27 Oct 2020 04:13)  T(F): 97.9 (27 Oct 2020 04:13), Max: 97.9 (27 Oct 2020 04:13)  HR: 58 (27 Oct 2020 04:13) (58 - 58)  BP: 136/89 (27 Oct 2020 04:13) (136/89 - 136/89)  BP(mean): --  RR: 18 (27 Oct 2020 04:13) (18 - 18)  SpO2: 100% (27 Oct 2020 04:13) (100% - 100%)    Neurological (>12):  MS: eyes closed, sluggishly opens eyes to voice and can show smile when asked and wiggle toes upon command, no verbal output    CNs: PERRL (R = 5mm, L = 5mm). VFF. 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 Alvares presenting as code stroke to [facility] for slurred speech and equivocal left arm and leg weakness with unclear LWK. Collateral information obtained from daughter Larissa over the phone (). She noted patient had been quiet all day and in the morning when they had heard an initial thud and then rechecked her around 3a 10/27 she seemed weaker than her baseline and was not following all commands. She has seen Dr. Emerald Navarrete at [facility] when she had the right parieto-temporal infarct.     At baseline, patient requires a walker for ambulation and wheelchair if in pain, requires assistance for some ADLs such as bathing    (Stroke only)  NIHSS: 17  MRS: 1    REVIEW OF SYSTEMS  unable to obtain at this time	    A 10-system ROS was performed and is negative except for those items noted above and/or in the HPI.    PAST MEDICAL & SURGICAL HISTORY:    MEDICATIONS (HOME):  Home Medications:    MEDICATIONS  (STANDING):    MEDICATIONS  (PRN):    ALLERGIES/INTOLERANCES:  Allergies  No Known Allergies    VITALS & EXAMINATION:  Vital Signs Last 24 Hrs  T(C): 36.6 (27 Oct 2020 04:13), Max: 36.6 (27 Oct 2020 04:13)  T(F): 97.9 (27 Oct 2020 04:13), Max: 97.9 (27 Oct 2020 04:13)  HR: 58 (27 Oct 2020 04:13) (58 - 58)  BP: 136/89 (27 Oct 2020 04:13) (136/89 - 136/89)  BP(mean): --  RR: 18 (27 Oct 2020 04:13) (18 - 18)  SpO2: 100% (27 Oct 2020 04:13) (100% - 100%)    Neurological (>12):  MS: eyes closed, sluggishly opens eyes to voice and can show smile when asked and wiggle toes upon command, no verbal output    CNs: PERRL (R = 5mm, L = 5mm). VFF. EOMI no nystagmus, no facial asymmetry    Motor: does not keep extremities in air, but able to move them somewhat spontaneously, does not move either hand out of the way when keeping hand above face	     STUDIES & IMAGING:  Studies (EKG, EEG, EMG, etc):     Radiology (XR, CT, MR, U/S, TTE/ELA):    < from: CT Brain Stroke Protocol (10.27.20 @ 04:33) >  FINDINGS:  No acute intracranial hemorrhage, mass effect or midline shift.  No CT evidence of acute large territory vascular infarct.  The ventricles and cortical sulci are prominent reflecting parenchymal volume loss.  Scattered hypodensities in periventricular white matter are nonspecific, but likely sequela of small vessel ischemic disease.    Old right posterior temporal occipital infarct is identified.    The visualized paranasal sinuses and mastoid air cells are well aerated.  No displaced calvarial fracture. The native ocular lenses are surgically absent.    IMPRESSION:  No acute intracranial hemorrhage or mass effect.    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 Alvares presenting as code stroke to [facility] for slurred speech and equivocal left arm and leg weakness with unclear LWK. Collateral information obtained from daughter Larissa over the phone (). She noted patient had been quiet all day and in the morning when they had heard an initial thud and then rechecked her around 3a 10/27 she seemed weaker than her baseline and was not following all commands. She has seen Dr. Emerald Navarrete at [facility] when she had the right parieto-temporal infarct. Has not missed doses of medication per daughter. Daughter notes that patient has gotten into recent arguments with fiance and has been depressed.     At baseline, patient requires a walker for ambulation and wheelchair if in pain, requires assistance for some ADLs such as bathing, patient lives with daughter    (Stroke only)  NIHSS: 17  MRS: 1    REVIEW OF SYSTEMS  unable to obtain at this time	    A 10-system ROS was performed and is negative except for those items noted above and/or in the HPI.    PAST MEDICAL & SURGICAL HISTORY:    MEDICATIONS (HOME):  Home Medications:    MEDICATIONS  (STANDING):    MEDICATIONS  (PRN):    ALLERGIES/INTOLERANCES:  Allergies  No Known Allergies    VITALS & EXAMINATION:  Vital Signs Last 24 Hrs  T(C): 36.6 (27 Oct 2020 04:13), Max: 36.6 (27 Oct 2020 04:13)  T(F): 97.9 (27 Oct 2020 04:13), Max: 97.9 (27 Oct 2020 04:13)  HR: 58 (27 Oct 2020 04:13) (58 - 58)  BP: 136/89 (27 Oct 2020 04:13) (136/89 - 136/89)  BP(mean): --  RR: 18 (27 Oct 2020 04:13) (18 - 18)  SpO2: 100% (27 Oct 2020 04:13) (100% - 100%)    Neurological (>12):  MS: eyes closed, sluggishly opens eyes to voice and can show smile when asked and wiggle toes upon command, no verbal output    CNs: PERRL (R = 5mm, L = 5mm). VFF. EOMI no nystagmus, no facial asymmetry    Motor: does not keep extremities in air, but able to move them somewhat spontaneously, does not move either hand out of the way when keeping hand above face	     STUDIES & IMAGING:  Studies (EKG, EEG, EMG, etc):     Radiology (XR, CT, MR, U/S, TTE/ELA):    < from: CT Brain Stroke Protocol (10.27.20 @ 04:33) >  FINDINGS:  No acute intracranial hemorrhage, mass effect or midline shift.  No CT evidence of acute large territory vascular infarct.  The ventricles and cortical sulci are prominent reflecting parenchymal volume loss.  Scattered hypodensities in periventricular white matter are nonspecific, but likely sequela of small vessel ischemic disease.    Old right posterior temporal occipital infarct is identified.    The visualized paranasal sinuses and mastoid air cells are well aerated.  No displaced calvarial fracture. The native ocular lenses are surgically absent.    IMPRESSION:  No acute intracranial hemorrhage or mass effect.    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 Alvares presenting as code stroke to [facility] for slurred speech and equivocal left arm and leg weakness with unclear LWK. Collateral information obtained from daughter Larissa over the phone (). She noted patient had been quiet all day and in the morning when they had heard an initial thud and then rechecked her around 3a 10/27 she seemed weaker than her baseline and was not following all commands. She has seen Dr. Emerald Navarrete at [facility] when she had the right parieto-temporal infarct. Has not missed doses of medication per daughter. Daughter notes that patient has gotten into recent arguments with fiance and has been depressed.     At baseline, patient requires a walker for ambulation and wheelchair if in pain, requires assistance for some ADLs such as bathing, patient lives with daughter, and spends about 20 hours a day with the patient. Per daughter, patient goes through periods of insomnia and for the past week patient has not slept very much, has been through immense amounts of stress as patient/family losing house in December. Outpatient Neurologist wanted to start patient on depakote, but the patient refused depakote.     (Stroke only)  NIHSS: 17  MRS: 1    REVIEW OF SYSTEMS  unable to obtain at this time	    A 10-system ROS was performed and is negative except for those items noted above and/or in the HPI.    PAST MEDICAL & SURGICAL HISTORY:    MEDICATIONS (HOME):  Home Medications:    MEDICATIONS  (STANDING):    MEDICATIONS  (PRN):    ALLERGIES/INTOLERANCES:  Allergies  No Known Allergies    VITALS & EXAMINATION:  Vital Signs Last 24 Hrs  T(C): 36.6 (27 Oct 2020 04:13), Max: 36.6 (27 Oct 2020 04:13)  T(F): 97.9 (27 Oct 2020 04:13), Max: 97.9 (27 Oct 2020 04:13)  HR: 58 (27 Oct 2020 04:13) (58 - 58)  BP: 136/89 (27 Oct 2020 04:13) (136/89 - 136/89)  BP(mean): --  RR: 18 (27 Oct 2020 04:13) (18 - 18)  SpO2: 100% (27 Oct 2020 04:13) (100% - 100%)    Neurological (>12):  MS: eyes closed, sluggishly opens eyes to voice and can show smile when asked and wiggle toes upon command, no verbal output    CNs: PERRL (R = 5mm, L = 5mm). VFF. EOMI no nystagmus, no facial asymmetry    Motor: does not keep extremities in air, but able to move them somewhat spontaneously, does not move either hand out of the way when keeping hand above face	     STUDIES & IMAGING:  Studies (EKG, EEG, EMG, etc):     Radiology (XR, CT, MR, U/S, TTE/ELA):    < from: CT Brain Stroke Protocol (10.27.20 @ 04:33) >  FINDINGS:  No acute intracranial hemorrhage, mass effect or midline shift.  No CT evidence of acute large territory vascular infarct.  The ventricles and cortical sulci are prominent reflecting parenchymal volume loss.  Scattered hypodensities in periventricular white matter are nonspecific, but likely sequela of small vessel ischemic disease.    Old right posterior temporal occipital infarct is identified.    The visualized paranasal sinuses and mastoid air cells are well aerated.  No displaced calvarial fracture. The native ocular lenses are surgically absent.    IMPRESSION:  No acute intracranial hemorrhage or mass effect.    < from: CT Angio Neck w/ IV Cont (10.27.20 @ 04:48) >  IMPRESSION:  CT brain perfusion: Noischemic penumbra.    CTA head and neck: Patent cervical and intracranial major arterial vasculature without evidence of abrupt vessel cut off, hemodynamically significant stenosis, aneurysm, or dissection.     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 Collateral information obtained from daughter Larissa over the phone (). She noted patient had been quiet all day and in the morning when they had heard an initial thud and then rechecked her around 3a 10/27 she seemed weaker than her baseline and was not following all commands. She has seen Dr. Emerald Navarrete at [facility] when she had the right parieto-temporal infarct. Has not missed doses of medication per daughter. Daughter notes that patient has gotten into recent arguments with fiance and has been depressed.     At baseline, patient requires a walker for ambulation and wheelchair if in pain, requires assistance for some ADLs such as bathing, patient lives with daughter, and spends about 20 hours a day with the patient. Per daughter, patient goes through periods of insomnia and for the past week patient has not slept very much, has been through immense amounts of stress as patient/family losing house in December. Outpatient Neurologist wanted to start patient on depakote, but the patient refused depakote.     (Stroke only)  NIHSS: 17  MRS: 1    REVIEW OF SYSTEMS  unable to obtain at this time	    A 10-system ROS was performed and is negative except for those items noted above and/or in the HPI.    PAST MEDICAL & SURGICAL HISTORY:    MEDICATIONS (HOME):  Home Medications:    MEDICATIONS  (STANDING):    MEDICATIONS  (PRN):    ALLERGIES/INTOLERANCES:  Allergies  No Known Allergies    VITALS & EXAMINATION:  Vital Signs Last 24 Hrs  T(C): 36.6 (27 Oct 2020 04:13), Max: 36.6 (27 Oct 2020 04:13)  T(F): 97.9 (27 Oct 2020 04:13), Max: 97.9 (27 Oct 2020 04:13)  HR: 58 (27 Oct 2020 04:13) (58 - 58)  BP: 136/89 (27 Oct 2020 04:13) (136/89 - 136/89)  BP(mean): --  RR: 18 (27 Oct 2020 04:13) (18 - 18)  SpO2: 100% (27 Oct 2020 04:13) (100% - 100%)    Neurological (>12):  MS: eyes closed, sluggishly opens eyes to voice and can show smile when asked and wiggle toes upon command, no verbal output    CNs: PERRL (R = 5mm, L = 5mm). VFF. EOMI no nystagmus, no facial asymmetry    Motor: does not keep extremities in air, but able to move them somewhat spontaneously, does not move either hand out of the way when keeping hand above face	     STUDIES & IMAGING:  Studies (EKG, EEG, EMG, etc):     Radiology (XR, CT, MR, U/S, TTE/ELA):    < from: CT Brain Stroke Protocol (10.27.20 @ 04:33) >  FINDINGS:  No acute intracranial hemorrhage, mass effect or midline shift.  No CT evidence of acute large territory vascular infarct.  The ventricles and cortical sulci are prominent reflecting parenchymal volume loss.  Scattered hypodensities in periventricular white matter are nonspecific, but likely sequela of small vessel ischemic disease.    Old right posterior temporal occipital infarct is identified.    The visualized paranasal sinuses and mastoid air cells are well aerated.  No displaced calvarial fracture. The native ocular lenses are surgically absent.    IMPRESSION:  No acute intracranial hemorrhage or mass effect.    < from: CT Angio Neck w/ IV Cont (10.27.20 @ 04:48) >  IMPRESSION:  CT brain perfusion: Noischemic penumbra.    CTA head and neck: Patent cervical and intracranial major arterial vasculature without evidence of abrupt vessel cut off, hemodynamically significant stenosis, aneurysm, or dissection.

## 2020-10-27 NOTE — CHART NOTE - NSCHARTNOTEFT_GEN_A_CORE
Patient has a tissue prosthetic valve (aortic valve replacement)  valve info: 21 mm bovine pericardial valve aravind cruz, 32 platinum job shield graft-

## 2020-10-27 NOTE — PROVIDER CONTACT NOTE (OTHER) - ASSESSMENT
SW had verbal huddle with RN regarding pt.  SW met with pt bedside.  Pt was A&Ox4 and was able to answer all SW questions.  Pt reported over 10 falls over the past 6 months due to left hip/ right knee pain and weakness.  Pt utilized a cane, walker, and wheel chair at home.  Pt currently lives with her daughter/emergency contact, Larissa, and her two friends, who assist her with all ADLs.  Pt was able to confirm her current doctors and when she was last seen.  Pt reported that she had open heart surgery this past February and that she is awaiting hip and knee surgery to address the pain and weakness.  Pt reports a history of depression and agitation/ aggression-sees neurologist for these.  Pt was unable to remember what happened the night prior that caused her ED visit without prompting.  Pt consented to SW contacting daughter/emergency contact, Larissa.

## 2020-10-27 NOTE — CONSULT NOTE ADULT - ATTENDING COMMENTS
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 Pt seen and examined in the ED. Pt awake but very drowsy, poor attention , answered [place] then later hospital, not otherwise oriented, FC across midline, some perseveration, immediate recall 3/3, delayed recall 0/3, naming intact, moderate dysarthria, mild left sided weakness which is baseline from her previous stroke. Pt overall encephalopathic.   Utox positive for opiates. Pt states that she takes it for hip pain, up to 5 pills a day, denies taking any last night.   CTH shows chronic right posterior temporal occipital infarct.   Thyroid Stimulating Hormone, Serum: 0.16 uIU/mL (10.27.20 @ 07:20)  Triiodothyronine, Total (T3 Total): 67.8 ng/dL (10.27.20 @ 07:20)    Pt presenting with encephalopathy of unknown etiology.   TSH and T3 very low.   Ruling out possible seizure, VEEG pending  MRI brain w/o  Infectious workup  Continue Eliquis for stroke prevention.   dysphagia screen  PT

## 2020-10-27 NOTE — ED ADULT TRIAGE NOTE - CHIEF COMPLAINT QUOTE
pt arrives from home from AMS. as per EMS family found patient sliding out her chair at 215am. pt started developing slurred speech at 3am. pt lethargic and weak at the moment. minimally responds to verbal stimuli. MD dash called for Stroke eval. Stroke code called. Pt bought back to CT scan. .pmh CVA.  daughter shirlene [phone]

## 2020-10-27 NOTE — ED PROVIDER NOTE - CLINICAL SUMMARY MEDICAL DECISION MAKING FREE TEXT BOX
Pedro BARRAZA MD PGY3: Patient here with AMS s/p fall with some foreshadowing earlier in the day with normal CT after code stroke. Labs so far normal and UA negative. CXR - ? COVID however not demonstrated on lung windows of CTA neck. Likely hypoventilation. Will follow-up serum and urine tox. Will reassess.

## 2020-10-27 NOTE — H&P ADULT - PROBLEM SELECTOR PLAN 2
Patient with h/o hypertension and is on coreg  -Continue home medications  -DASH diet  -Monitor blood pressure - on Cymbalta, will continue  - pending psych consult

## 2020-10-27 NOTE — ED PROVIDER NOTE - PROGRESS NOTE DETAILS
Pedro BARRAZA MD PGY3: Mental status mildly improving. Patient now able to say words like "Eliquis" before falling back asleep, but still generally not participating with exam. Admitted for AMS.

## 2020-10-27 NOTE — BEHAVIORAL HEALTH ASSESSMENT NOTE - NSBHCHARTREVIEWVS_PSY_A_CORE FT
Vital Signs Last 24 Hrs  T(C): 36.4 (27 Oct 2020 14:07), Max: 36.7 (27 Oct 2020 04:54)  T(F): 97.5 (27 Oct 2020 14:07), Max: 98 (27 Oct 2020 04:54)  HR: 65 (27 Oct 2020 14:07) (58 - 75)  BP: 147/97 (27 Oct 2020 14:07) (98/80 - 147/97)  BP(mean): --  RR: 18 (27 Oct 2020 14:07) (16 - 18)  SpO2: 99% (27 Oct 2020 14:07) (97% - 100%)

## 2020-10-27 NOTE — H&P ADULT - NSICDXPASTSURGICALHX_GEN_ALL_CORE_FT
PAST SURGICAL HISTORY:  H/O bilateral salpingo-oophorectomy     H/O thoracic aortic aneurysm repair     History of total abdominal hysterectomy

## 2020-10-27 NOTE — CONSULT NOTE ADULT - ASSESSMENT
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 Alvares presenting as code stroke to [facility] for slurred speech and equivocal left arm and leg weakness with unclear LWK. Neurological examination equivocal as patient follows some commands but not always consistent exam. CTH noncon demonstrates old R parieto-temporal infarct. CTA H & N pending. CTP shows 0 cc core infarct and 0cc hypoperfused area. Patient on eliquis and out of window for tpa. Pending CTA H & N though patient's poor functional baseline makes patient not candidate for mechanical thrombectomy. Clinical impression at this time includes patient demonstrating increased lethargy without true focality with likely global cerebral dysfunction with ddx including TIA/stroke, seizure/post-ictal, toxic metabolic encephalopathy, catatonia.     Recommendations:  [ ] f/u final read of CTA H & N and CTP  [ ] c/w eliquis 5mg bid  [ ] c/w keppra 500mg bid  [ ] med rec per primary team  [ ] vit B12, folate, ammonia, methylmalonic acid, homocysteine, TSH, heavy metal tox screen, UA, urine tox screen, CXR  [ ] monitor fever and wbc curve (has not spiked fever at this time)  [ ] MRI head w/o contrast    -Management & disposition to be discussed with Neurology Attending Dr. 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 Alvares presenting as code stroke to [facility] for slurred speech and equivocal left arm and leg weakness with unclear LWK. Neurological examination equivocal as patient follows some commands but not always consistent exam. CTH noncon demonstrates old R parieto-temporal infarct. CTA H & N pending. CTP shows 0 cc core infarct and 0cc hypoperfused area. Patient on eliquis and out of window for tpa. CTA H & N does not demonstrate LVO and with patient's poor functional baseline, patient not candidate for mechanical thrombectomy. Clinical impression at this time includes patient demonstrating increased lethargy without focality with likely global cerebral dysfunction with ddx including TIA/stroke, seizure/post-ictal, toxic metabolic encephalopathy, catatonia.     Recommendations:  [x] f/u final read of CTA H & N and CTP  [ ] trial of ativan 1mg x 1 IVP for consideration of catatonia  [ ] psych consult  [ ] c/w eliquis 5mg bid  [ ] c/w keppra 500mg bid  [ ] keppra level  [ ] video EEG  [ ] med rec per primary team  [ ] vit B12, folate, ammonia, methylmalonic acid, homocysteine, TSH, heavy metal tox screen, UA, urine tox screen, CXR  [ ] monitor fever and wbc curve (has not spiked fever at this time)  [ ] MRI head w/o contrast    -Management & disposition to be discussed with Neurology Attending Dr. 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 Alvares presenting as code stroke to [facility] for slurred speech and equivocal left arm and leg weakness with unclear LWK. Neurological examination equivocal as patient follows some commands but not always consistent exam. CTH noncon demonstrates old R parieto-temporal infarct. CTA H & N pending. CTP shows 0 cc core infarct and 0cc hypoperfused area. Patient on eliquis and out of window for tpa. CTA H & N does not demonstrate LVO and with patient's poor functional baseline, patient not candidate for mechanical thrombectomy. Clinical impression at this time includes patient demonstrating increased lethargy without focality with likely global cerebral dysfunction with ddx including TIA/stroke, seizure/post-ictal, toxic metabolic encephalopathy, catatonia.     Recommendations:  [x] f/u final read of CTA H & N and CTP  [ ] may consider trial of ativan 1mg x 1 IVP for possible catatonia  [ ] psych consult  [ ] c/w eliquis 5mg bid  [ ] c/w keppra 500mg bid  [ ] keppra level  [ ] video EEG  [ ] med rec per primary team  [ ] vit B12, folate, ammonia, methylmalonic acid, homocysteine, TSH, heavy metal tox screen, UA, urine tox screen, CXR  [ ] monitor fever and wbc curve (has not spiked fever at this time)  [ ] MRI head w/o contrast    -Management & disposition to be discussed with Neurology Attending Dr. 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  Neurological examination equivocal as patient follows some commands but not always consistent exam. CTH noncon demonstrates old R parieto-temporal infarct. CTA H & N pending. CTP shows 0 cc core infarct and 0cc hypoperfused area. Patient on eliquis and out of window for tpa. CTA H & N does not demonstrate LVO and with patient's poor functional baseline, patient not candidate for mechanical thrombectomy. Clinical impression at this time includes patient demonstrating increased lethargy without focality with likely global cerebral dysfunction with ddx including TIA/stroke, seizure/post-ictal, toxic metabolic encephalopathy, catatonia.     Recommendations:  [x] f/u final read of CTA H & N and CTP  [ ] may consider trial of ativan 1mg x 1 IVP for possible catatonia  [ ] psych consult  [ ] c/w eliquis 5mg bid  [ ] c/w keppra 500mg bid  [ ] keppra level  [ ] video EEG  [ ] med rec per primary team  [ ] vit B12, folate, ammonia, methylmalonic acid, homocysteine, TSH, heavy metal tox screen, UA, urine tox screen, CXR  [ ] monitor fever and wbc curve (has not spiked fever at this time)  [ ] MRI head w/o contrast    -Management & disposition to be discussed with Neurology Attending Dr. Dayron Dias, daughter, updated at length of the above ()

## 2020-10-27 NOTE — ED PROVIDER NOTE - ATTENDING CONTRIBUTION TO CARE
Attending note:   After face to face evaluation of this patient, I concur with above noted hx, pe, and care plan for this patient.  Guajardo: 70 yof with multiple chronic medical conditions sent to ED by family after noticing change in her usual mental status. Pt was last seen well at 10pm, then found to be slumping over and not following commands well at 2am. In ED, stroke code was called. CT performed and pt evaluated by neurology resident. Pt is somnolent, awakens to painful stimuli, followed few commands, pupils not pinpoint, reactive, neck supple, clear lungs, RRR, abd soft and non tender, no pitting edema, no JVD, pulses present and strong in all ext. Pt has gag reflex, rectal tone present but poor, rectal temp afebrile.   Pt takes multiple psychotropic medications and sedatives, fentanyl patch removed in CT. Pt's change in mental status is most likely medication induced vs infection or stroke. Will get labs, CT, ekg, close monitoring of vitals, no indication for narcan as pt is chronic opoid use and non pinpoint pupils, spon resp and normal O2.

## 2020-10-28 DIAGNOSIS — G93.40 ENCEPHALOPATHY, UNSPECIFIED: ICD-10-CM

## 2020-10-28 DIAGNOSIS — I63.9 CEREBRAL INFARCTION, UNSPECIFIED: ICD-10-CM

## 2020-10-28 DIAGNOSIS — M48.00 SPINAL STENOSIS, SITE UNSPECIFIED: ICD-10-CM

## 2020-10-28 DIAGNOSIS — G40.909 EPILEPSY, UNSPECIFIED, NOT INTRACTABLE, WITHOUT STATUS EPILEPTICUS: ICD-10-CM

## 2020-10-28 DIAGNOSIS — I48.91 UNSPECIFIED ATRIAL FIBRILLATION: ICD-10-CM

## 2020-10-28 LAB
AMMONIA BLD-MCNC: 31 UMOL/L — SIGNIFICANT CHANGE UP (ref 11–55)
ANION GAP SERPL CALC-SCNC: 11 MMO/L — SIGNIFICANT CHANGE UP (ref 7–14)
BUN SERPL-MCNC: 14 MG/DL — SIGNIFICANT CHANGE UP (ref 7–23)
CALCIUM SERPL-MCNC: 9 MG/DL — SIGNIFICANT CHANGE UP (ref 8.4–10.5)
CHLORIDE SERPL-SCNC: 104 MMOL/L — SIGNIFICANT CHANGE UP (ref 98–107)
CO2 SERPL-SCNC: 26 MMOL/L — SIGNIFICANT CHANGE UP (ref 22–31)
CREAT SERPL-MCNC: 0.63 MG/DL — SIGNIFICANT CHANGE UP (ref 0.5–1.3)
CULTURE RESULTS: NO GROWTH — SIGNIFICANT CHANGE UP
GLUCOSE SERPL-MCNC: 84 MG/DL — SIGNIFICANT CHANGE UP (ref 70–99)
HCT VFR BLD CALC: 32.3 % — LOW (ref 34.5–45)
HGB BLD-MCNC: 10.3 G/DL — LOW (ref 11.5–15.5)
MAGNESIUM SERPL-MCNC: 2.1 MG/DL — SIGNIFICANT CHANGE UP (ref 1.6–2.6)
MCHC RBC-ENTMCNC: 31.4 PG — SIGNIFICANT CHANGE UP (ref 27–34)
MCHC RBC-ENTMCNC: 31.9 % — LOW (ref 32–36)
MCV RBC AUTO: 98.5 FL — SIGNIFICANT CHANGE UP (ref 80–100)
NRBC # FLD: 0 K/UL — SIGNIFICANT CHANGE UP (ref 0–0)
PHOSPHATE SERPL-MCNC: 3.4 MG/DL — SIGNIFICANT CHANGE UP (ref 2.5–4.5)
PLATELET # BLD AUTO: 265 K/UL — SIGNIFICANT CHANGE UP (ref 150–400)
PMV BLD: 9.8 FL — SIGNIFICANT CHANGE UP (ref 7–13)
POTASSIUM SERPL-MCNC: 3.5 MMOL/L — SIGNIFICANT CHANGE UP (ref 3.5–5.3)
POTASSIUM SERPL-SCNC: 3.5 MMOL/L — SIGNIFICANT CHANGE UP (ref 3.5–5.3)
RBC # BLD: 3.28 M/UL — LOW (ref 3.8–5.2)
RBC # FLD: 13.4 % — SIGNIFICANT CHANGE UP (ref 10.3–14.5)
SARS-COV-2 IGG SERPL QL IA: NEGATIVE — SIGNIFICANT CHANGE UP
SARS-COV-2 IGM SERPL IA-ACNC: <3.8 AU/ML — SIGNIFICANT CHANGE UP
SODIUM SERPL-SCNC: 141 MMOL/L — SIGNIFICANT CHANGE UP (ref 135–145)
SPECIMEN SOURCE: SIGNIFICANT CHANGE UP
WBC # BLD: 4.74 K/UL — SIGNIFICANT CHANGE UP (ref 3.8–10.5)
WBC # FLD AUTO: 4.74 K/UL — SIGNIFICANT CHANGE UP (ref 3.8–10.5)

## 2020-10-28 PROCEDURE — 99232 SBSQ HOSP IP/OBS MODERATE 35: CPT | Mod: GC

## 2020-10-28 PROCEDURE — 99233 SBSQ HOSP IP/OBS HIGH 50: CPT

## 2020-10-28 RX ORDER — ONDANSETRON 8 MG/1
1 TABLET, FILM COATED ORAL
Qty: 0 | Refills: 0 | DISCHARGE

## 2020-10-28 RX ORDER — OXYCODONE AND ACETAMINOPHEN 5; 325 MG/1; MG/1
1 TABLET ORAL
Qty: 0 | Refills: 0 | DISCHARGE

## 2020-10-28 RX ORDER — DULOXETINE HYDROCHLORIDE 30 MG/1
1 CAPSULE, DELAYED RELEASE ORAL
Qty: 0 | Refills: 0 | DISCHARGE

## 2020-10-28 RX ORDER — LAMOTRIGINE 25 MG/1
1 TABLET, ORALLY DISINTEGRATING ORAL
Qty: 0 | Refills: 0 | DISCHARGE

## 2020-10-28 RX ORDER — ACETAMINOPHEN 500 MG
650 TABLET ORAL ONCE
Refills: 0 | Status: COMPLETED | OUTPATIENT
Start: 2020-10-28 | End: 2020-10-28

## 2020-10-28 RX ORDER — LEVOTHYROXINE SODIUM 125 MCG
1 TABLET ORAL
Qty: 0 | Refills: 0 | DISCHARGE

## 2020-10-28 RX ORDER — ASPIRIN/CALCIUM CARB/MAGNESIUM 324 MG
1 TABLET ORAL
Qty: 0 | Refills: 0 | DISCHARGE

## 2020-10-28 RX ORDER — APIXABAN 2.5 MG/1
1 TABLET, FILM COATED ORAL
Qty: 0 | Refills: 0 | DISCHARGE

## 2020-10-28 RX ORDER — ACETAMINOPHEN 500 MG
650 TABLET ORAL EVERY 6 HOURS
Refills: 0 | Status: DISCONTINUED | OUTPATIENT
Start: 2020-10-28 | End: 2020-10-30

## 2020-10-28 RX ORDER — POTASSIUM CHLORIDE 20 MEQ
1 PACKET (EA) ORAL
Qty: 0 | Refills: 0 | DISCHARGE

## 2020-10-28 RX ORDER — OXYCODONE HYDROCHLORIDE 5 MG/1
10 TABLET ORAL EVERY 6 HOURS
Refills: 0 | Status: DISCONTINUED | OUTPATIENT
Start: 2020-10-28 | End: 2020-10-30

## 2020-10-28 RX ORDER — VALACYCLOVIR 500 MG/1
0 TABLET, FILM COATED ORAL
Qty: 0 | Refills: 0 | DISCHARGE

## 2020-10-28 RX ORDER — LEVETIRACETAM 250 MG/1
1 TABLET, FILM COATED ORAL
Qty: 0 | Refills: 0 | DISCHARGE

## 2020-10-28 RX ADMIN — Medication 650 MILLIGRAM(S): at 07:25

## 2020-10-28 RX ADMIN — OXYCODONE HYDROCHLORIDE 10 MILLIGRAM(S): 5 TABLET ORAL at 11:05

## 2020-10-28 RX ADMIN — Medication 650 MILLIGRAM(S): at 06:38

## 2020-10-28 RX ADMIN — LAMOTRIGINE 300 MILLIGRAM(S): 25 TABLET, ORALLY DISINTEGRATING ORAL at 23:05

## 2020-10-28 RX ADMIN — Medication 81 MILLIGRAM(S): at 11:32

## 2020-10-28 RX ADMIN — ATORVASTATIN CALCIUM 40 MILLIGRAM(S): 80 TABLET, FILM COATED ORAL at 23:05

## 2020-10-28 RX ADMIN — APIXABAN 5 MILLIGRAM(S): 2.5 TABLET, FILM COATED ORAL at 17:04

## 2020-10-28 RX ADMIN — CARVEDILOL PHOSPHATE 12.5 MILLIGRAM(S): 80 CAPSULE, EXTENDED RELEASE ORAL at 17:04

## 2020-10-28 RX ADMIN — SODIUM CHLORIDE 75 MILLILITER(S): 9 INJECTION INTRAMUSCULAR; INTRAVENOUS; SUBCUTANEOUS at 03:30

## 2020-10-28 RX ADMIN — Medication 2 MILLIGRAM(S): at 23:06

## 2020-10-28 RX ADMIN — Medication 650 MILLIGRAM(S): at 21:16

## 2020-10-28 RX ADMIN — APIXABAN 5 MILLIGRAM(S): 2.5 TABLET, FILM COATED ORAL at 05:48

## 2020-10-28 RX ADMIN — Medication 175 MICROGRAM(S): at 05:48

## 2020-10-28 RX ADMIN — LEVETIRACETAM 500 MILLIGRAM(S): 250 TABLET, FILM COATED ORAL at 05:48

## 2020-10-28 RX ADMIN — Medication 2 MILLIGRAM(S): at 11:32

## 2020-10-28 RX ADMIN — Medication 650 MILLIGRAM(S): at 20:15

## 2020-10-28 RX ADMIN — DULOXETINE HYDROCHLORIDE 60 MILLIGRAM(S): 30 CAPSULE, DELAYED RELEASE ORAL at 11:32

## 2020-10-28 RX ADMIN — OXYCODONE HYDROCHLORIDE 10 MILLIGRAM(S): 5 TABLET ORAL at 10:27

## 2020-10-28 RX ADMIN — LEVETIRACETAM 500 MILLIGRAM(S): 250 TABLET, FILM COATED ORAL at 17:04

## 2020-10-28 NOTE — PROGRESS NOTE ADULT - ASSESSMENT
70F Aortic arch aneurysm repair s/p AVR c/b CVA and SZ D/O, Depression/anxiety, HTN, hypothyroid, Afib on Eliquis, RA, OA, SLE, spinal stenosis - chronic opioid pain control and wheelchair bound, Uterine CA s/p TAHBSO p/w acute encephalopathy concern for new CVA vs SZ D/O vs exacerbation of anxiety.

## 2020-10-28 NOTE — PROGRESS NOTE ADULT - SUBJECTIVE AND OBJECTIVE BOX
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 No overnight events. Per patient, she is currently experiencing a headache that is similar to the headaches that she has been getting since her 3/2020 CVA. Patient states that at home she treats these headaches with oxycodone, which she takes up to 6 times per day. Patient states she also takes benzodiazepines 3x daily at home. Patient states she has residual left sided sensory loss from her 3/2020 CVA. Patient states that she has not walked since coming to the hospital and usually walks with a walker.        MEDICATIONS  (STANDING):  apixaban 5 milliGRAM(s) Oral every 12 hours  aspirin enteric coated 81 milliGRAM(s) Oral daily  atorvastatin 40 milliGRAM(s) Oral at bedtime  carvedilol 12.5 milliGRAM(s) Oral every 12 hours  DULoxetine 60 milliGRAM(s) Oral daily  lamoTRIgine 300 milliGRAM(s) Oral at bedtime  levETIRAcetam 500 milliGRAM(s) Oral two times a day  levothyroxine 175 MICROGram(s) Oral daily  sodium chloride 0.9%. 1000 milliLiter(s) (75 mL/Hr) IV Continuous <Continuous>    MEDICATIONS  (PRN):  acetaminophen   Tablet .. 650 milliGRAM(s) Oral every 6 hours PRN Mild Pain (1 - 3)  gabapentin 300 milliGRAM(s) Oral three times a day PRN pain  LORazepam     Tablet 2 milliGRAM(s) Oral every 8 hours PRN Anxiety  oxyCODONE    IR 10 milliGRAM(s) Oral every 6 hours PRN moderate and severe pain    PAST MEDICAL & SURGICAL HISTORY:  Atrial fibrillation    Spinal stenosis    Uterine cancer    Stroke    Atrial fibrillation    Cerebrovascular accident (CVA)    H/O aortic aneurysm  and thoracic aortic aneurysm    Subacute systemic lupus erythematosus    Cataract    Hypothyroid    Hip dislocation, left  avascular necrosis    Fibromyalgia    Uterine cancer    Spinal stenosis of lumbar region    Bipolar depression    HLD (hyperlipidemia)    HTN (hypertension)    H/O thoracic aortic aneurysm repair    H/O bilateral salpingo-oophorectomy    History of total abdominal hysterectomy    H/O tubal ligation    History of cataract surgery    H/O total hysterectomy    H/O:   x2    History of hip surgery      FAMILY HISTORY:  No pertinent family history in first degree relatives    Family history of aneurysm (Grandparent)  AAA    Family history of stomach cancer (Grandparent)    Family history of pancreatic cancer      Allergies    Avelox (Rash)    Intolerances        SHx - No smoking, No ETOH, No drug abuse      Review of Systems:  CONSTITUTIONAL:   HEENT:  No visual loss, blurred vision, double vision.  No hearing loss, sneezing, congestion, runny nose or sore throat.  SKIN:  No rash or itching.  CARDIOVASCULAR:  No chest pain, chest pressure or chest discomfort. No palpitations or edema.  RESPIRATORY:  No shortness of breath, cough or sputum.  GASTROINTESTINAL:  No anorexia, nausea, vomiting or diarrhea. No abdominal pain.  GENITOURINARY:  NO dysuria. No increased frequency. No retention. No incontinence.  NEUROLOGICAL:  See HPI  MUSCULOSKELETAL:  No muscle, back pain, joint pain or stiffness.  HEMATOLOGIC:  No anemia, bleeding or bruising.  PSYCHIATRIC:    ENDOCRINOLOGIC:  No reports of sweating, cold or heat intolerance. No polyuria or polydipsia.        Vital Signs Last 24 Hrs  T(C): 36.5 (28 Oct 2020 09:00), Max: 36.8 (27 Oct 2020 18:36)  T(F): 97.7 (28 Oct 2020 09:00), Max: 98.3 (27 Oct 2020 18:36)  HR: 62 (28 Oct 2020 09:00) (54 - 70)  BP: 140/58 (28 Oct 2020 09:00) (124/57 - 154/81)  BP(mean): --  RR: 18 (28 Oct 2020 09:00) (18 - 18)  SpO2: 99% (28 Oct 2020 09:00) (99% - 100%)    General Exam:   General appearance: No acute distress    Cardiac:  Pulm:                 Neurological Exam:  Mental Status: Orientated to self, date and place.  Attention intact.  No dysarthria; patient states voice at baseline. Speech fluent.  Cranial Nerves:   PERRL, EOMI, no nystagmus.    CN V1-3 intact to light touch .  No facial asymmetry.  Hearing intact to finger rub bilaterally.  Tongue, uvula and palate midline.  Sternocleidomastoid and Trapezius intact bilaterally.    Motor:   Tone: normal.                  Strength:     [] Upper extremity                             Delt       Bicep    Tricep                                                  R         /        5/5        5/5       5/5                                               L          4/5        4/5        4/5       5/5  LUE strength exam limited by pain.  [] Lower extremity                                  KE          KF        DF         PF                                               R             /        5/       5/       5/5                                               L             5/       5/5       5/5        5/5  Assessment of hip flexion limited by hip replacement.                No truncal ataxia.    Tremor: No resting, postural or action tremor.  No myoclonus.  Sensation: Patient reports left side dumbness at baseline. Patient reports decreased sensation to light touch and temperature in LUE and LLE; sensation to light touch and temperature intact in RUE and RLE.    Deep Tendon Reflexes:     Biceps          Triceps      BR        Patellar        Ankle         Babinski                                         R       2+                   2+           2+            2+               2+           downgoing                                         L        2+                  2+           2+            2+               2+           downgoing    Gait: unable to assess.    Other:    10-28    141  |  104  |  14  ----------------------------<  84  3.5   |  26  |  0.63    Ca    9.0      28 Oct 2020 06:22  Phos  3.4     10-28  Mg     2.1     10-28    TPro  7.2  /  Alb  4.3  /  TBili  0.3  /  DBili  x   /  AST  12  /  ALT  9   /  AlkPhos  92  10-27                            10.3   4.74  )-----------( 265      ( 28 Oct 2020 06:22 )             32.3       Radiology    CT: < from: CT Angio Head w/ IV Cont (10.27.20 @ 04:48) >  IMPRESSION:  CT brain perfusion: Noischemic penumbra.    CTA head and neck: Patent cervical and intracranial major arterial vasculature without evidence of abrupt vessel cut off, hemodynamically significant stenosis, aneurysm, or dissection.    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     Interval hx: No overnight events. Per patient, she is currently experiencing a headache that is similar to the headaches that she has been getting since her 3/2020 CVA. Patient states that at home she treats these headaches with oxycodone, which she takes up to 6 times per day. Patient states she also takes benzodiazepines 3x daily at home sometimes up to 6. Patient states she has residual left sided sensory loss from her 3/2020 CVA. Patient states that she has not walked since coming to the hospital and usually walks with a walker.        MEDICATIONS  (STANDING):  apixaban 5 milliGRAM(s) Oral every 12 hours  aspirin enteric coated 81 milliGRAM(s) Oral daily  atorvastatin 40 milliGRAM(s) Oral at bedtime  carvedilol 12.5 milliGRAM(s) Oral every 12 hours  DULoxetine 60 milliGRAM(s) Oral daily  lamoTRIgine 300 milliGRAM(s) Oral at bedtime  levETIRAcetam 500 milliGRAM(s) Oral two times a day  levothyroxine 175 MICROGram(s) Oral daily  sodium chloride 0.9%. 1000 milliLiter(s) (75 mL/Hr) IV Continuous <Continuous>    MEDICATIONS  (PRN):  acetaminophen   Tablet .. 650 milliGRAM(s) Oral every 6 hours PRN Mild Pain (1 - 3)  gabapentin 300 milliGRAM(s) Oral three times a day PRN pain  LORazepam     Tablet 2 milliGRAM(s) Oral every 8 hours PRN Anxiety  oxyCODONE    IR 10 milliGRAM(s) Oral every 6 hours PRN moderate and severe pain    PAST MEDICAL & SURGICAL HISTORY:  Atrial fibrillation  Spinal stenosis  Uterine cancer  Atrial fibrillation  Cerebrovascular accident (CVA)  H/O aortic aneurysm  and thoracic aortic aneurysm  Subacute systemic lupus erythematosus  Cataract  Hypothyroid  Hip dislocation, left  avascular necrosis  Fibromyalgia  Uterine cancer  Spinal stenosis of lumbar region  Bipolar depression  HLD (hyperlipidemia)  HTN (hypertension)      H/O thoracic aortic aneurysm repair  H/O bilateral salpingo-oophorectomy  History of total abdominal hysterectomy  H/O tubal ligation  History of cataract surgery    H/O:   x2  History of hip surgery      FAMILY HISTORY:  No pertinent family history in first degree relatives    Family history of aneurysm (Grandparent)  AAA    Family history of stomach cancer (Grandparent)    Family history of pancreatic cancer      Allergies- Avelox (Rash)    Intolerances- unknown      SHx - No smoking, No ETOH, No drug abuse      Review of Systems:  CONSTITUTIONAL:   HEENT:  No visual loss, blurred vision, double vision.  No hearing loss, sneezing, congestion, runny nose or sore throat.  SKIN:  No rash or itching.  CARDIOVASCULAR:  No chest pain, chest pressure or chest discomfort. No palpitations or edema.  RESPIRATORY:  No shortness of breath, cough or sputum.  GASTROINTESTINAL:  No anorexia, nausea, vomiting or diarrhea. No abdominal pain.  GENITOURINARY:  NO dysuria. No increased frequency. No retention. No incontinence.  NEUROLOGICAL:  See HPI  MUSCULOSKELETAL:  No muscle, back pain, joint pain or stiffness.  HEMATOLOGIC:  No anemia, bleeding or bruising.  PSYCHIATRIC:    ENDOCRINOLOGIC:  No reports of sweating, cold or heat intolerance. No polyuria or polydipsia.        Vital Signs Last 24 Hrs  T(C): 36.5 (28 Oct 2020 09:00), Max: 36.8 (27 Oct 2020 18:36)  T(F): 97.7 (28 Oct 2020 09:00), Max: 98.3 (27 Oct 2020 18:36)  HR: 62 (28 Oct 2020 09:00) (54 - 70)  BP: 140/58 (28 Oct 2020 09:00) (124/57 - 154/81)  BP(mean): --  RR: 18 (28 Oct 2020 09:00) (18 - 18)  SpO2: 99% (28 Oct 2020 09:00) (99% - 100%)    General Exam:   General appearance: No acute distress    Cardiac: RRR  Pulm:  breathing comfortably               Neurological Exam:  Mental Status: Orientated to self, date and place.  Attention intact.  No dysarthria; patient states voice at baseline. Speech fluent.  Cranial Nerves:   PERRL, EOMI, no nystagmus.    CN V1-3 intact to light touch .  No facial asymmetry.  Hearing intact to finger rub bilaterally.  Tongue, uvula and palate midline.  Sternocleidomastoid and Trapezius intact bilaterally.    Motor:   Tone: normal.                  Strength:     [] Upper extremity                             Delt       Bicep    Tricep                                                  R         //        5/       5/5                                               L          //        4/5       5/5  LUE strength exam limited by pain.  [] Lower extremity                                  KE          KF        DF         PF                                               R             ///       5/                                               L             //       5/        5/  Assessment of hip flexion limited by hip replacement.                No truncal ataxia.    Tremor: No resting, postural or action tremor.  No myoclonus.  Sensation: Patient reports left side dumbness at baseline. Patient reports decreased sensation to light touch and temperature in LUE and LLE; sensation to light touch and temperature intact in RUE and RLE.    Deep Tendon Reflexes:     Biceps          Triceps      BR        Patellar        Ankle         Babinski                                         R       2+                   2+           2+            2+               2+           downgoing                                         L        2+                  2+           2+            2+               2+           downgoing    Gait: unable to assess.    Other:    10-28    141  |  104  |  14  ----------------------------<  84  3.5   |  26  |  0.63    Ca    9.0      28 Oct 2020 06:22  Phos  3.4     10-28  Mg     2.1     10-28    TPro  7.2  /  Alb  4.3  /  TBili  0.3  /  DBili  x   /  AST  12  /  ALT  9   /  AlkPhos  92  10-27                            10.3   4.74  )-----------( 265      ( 28 Oct 2020 06:22 )             32.3       Radiology    CT: < from: CT Angio Head w/ IV Cont (10.27.20 @ 04:48) >  IMPRESSION:  CT brain perfusion: Noischemic penumbra.    CTA head and neck: Patent cervical and intracranial major arterial vasculature without evidence of abrupt vessel cut off, hemodynamically significant stenosis, aneurysm, or dissection.    < end of copied text >

## 2020-10-28 NOTE — PROGRESS NOTE ADULT - PROBLEM SELECTOR PLAN 4
with anxiety.  Ativan resumed.  Had monitor off on opioids - resumed for pain control.  Continue cymbalta, neurontin, lamicta, keppra.

## 2020-10-28 NOTE — PROGRESS NOTE ADULT - SUBJECTIVE AND OBJECTIVE BOX
[facility] Division of Hospital Medicine  Felipe Calderon MD  Pager (KATIE-F, 8A-5P): [phone]  Other Times:  [id number]    Patient is a 70y old  Female who presents with a chief complaint of AMS    SUBJECTIVE / OVERNIGHT EVENTS:  Patient still complaining of Left shoulder pain from fall.  No new episodes of aphasia since admission.  Has baseline speech difficulties and Left sided weakness from previous CVA.  No F/C, N/V, CP, SOB, Cough, lightheadedness, dizziness, abdominal pain, diarrhea, dysuria.    MEDICATIONS  (STANDING):  apixaban 5 milliGRAM(s) Oral every 12 hours  aspirin enteric coated 81 milliGRAM(s) Oral daily  atorvastatin 40 milliGRAM(s) Oral at bedtime  carvedilol 12.5 milliGRAM(s) Oral every 12 hours  DULoxetine 60 milliGRAM(s) Oral daily  lamoTRIgine 300 milliGRAM(s) Oral at bedtime  levETIRAcetam 500 milliGRAM(s) Oral two times a day  levothyroxine 175 MICROGram(s) Oral daily  sodium chloride 0.9%. 1000 milliLiter(s) (75 mL/Hr) IV Continuous <Continuous>    MEDICATIONS  (PRN):  acetaminophen   Tablet .. 650 milliGRAM(s) Oral every 6 hours PRN Mild Pain (1 - 3)  gabapentin 300 milliGRAM(s) Oral three times a day PRN pain  LORazepam     Tablet 2 milliGRAM(s) Oral every 8 hours PRN Anxiety  oxyCODONE    IR 10 milliGRAM(s) Oral every 6 hours PRN moderate and severe pain      Vital Signs Last 24 Hrs  T(C): 36.5 (28 Oct 2020 09:00), Max: 36.8 (27 Oct 2020 18:36)  T(F): 97.7 (28 Oct 2020 09:00), Max: 98.3 (27 Oct 2020 18:36)  HR: 62 (28 Oct 2020 09:00) (54 - 70)  BP: 140/58 (28 Oct 2020 09:00) (124/57 - 154/81)  BP(mean): --  RR: 18 (28 Oct 2020 09:00) (18 - 18)  SpO2: 99% (28 Oct 2020 09:00) (99% - 100%)  CAPILLARY BLOOD GLUCOSE        I&O's Summary    27 Oct 2020 07:01  -  28 Oct 2020 07:00  --------------------------------------------------------  IN: 625 mL / OUT: 360 mL / NET: 265 mL        PHYSICAL EXAM:  GENERAL: NAD  HEAD:  Atraumatic, Normocephalic  EYES: EOMI, PERRLA, conjunctiva and sclera clear  NECK: Supple, No JVD  CHEST/LUNG: Clear to auscultation bilaterally; No wheeze  HEART: Regular rate and rhythm; No murmurs, rubs, or gallops  ABDOMEN: Soft, Nontender, Nondistended; Bowel sounds present  EXTREMITIES:  2+ Peripheral Pulses, No clubbing, cyanosis, or edema  PSYCH: Anxious  NEUROLOGY: A/Ox3, left hemiparesis  SKIN: Multiple tattoos present    LABS:                        10.3   4.74  )-----------( 265      ( 28 Oct 2020 06:22 )             32.3     10-28    141  |  104  |  14  ----------------------------<  84  3.5   |  26  |  0.63    Ca    9.0      28 Oct 2020 06:22  Phos  3.4     10-28  Mg     2.1     10-28    TPro  7.2  /  Alb  4.3  /  TBili  0.3  /  DBili  x   /  AST  12  /  ALT  9   /  AlkPhos  92  10-27    PT/INR - ( 27 Oct 2020 04:15 )   PT: 17.4 SEC;   INR: 1.54          PTT - ( 27 Oct 2020 04:15 )  PTT:38.0 SEC      Urinalysis Basic - ( 27 Oct 2020 05:00 )    Color: YELLOW / Appearance: CLEAR / SG: > 1.040 / pH: 6.0  Gluc: NEGATIVE / Ketone: NEGATIVE  / Bili: NEGATIVE / Urobili: NORMAL   Blood: NEGATIVE / Protein: 20 / Nitrite: NEGATIVE   Leuk Esterase: NEGATIVE / RBC: 3-5 / WBC 0-2   Sq Epi: OCC / Non Sq Epi: x / Bacteria: NEGATIVE        RADIOLOGY & ADDITIONAL TESTS:  MRI Brain pend  vEEG pending    Imaging Personally Reviewed:    Care Discussed with Consultants/Other Providers:

## 2020-10-28 NOTE — PROGRESS NOTE ADULT - ASSESSMENT
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 On exam, patient has improvement in dysarthria to baseline. Patient reports left-sided sensory deficits at baseline. Folate, B12, homocysteine, ammonia, TSH, and UA WNL. Toxicology screening shows +oxycodone.    Assessment: acute onset slurred speech and weakness secondary to TIA vs. 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 On exam, patient has improvement in dysarthria to baseline. Patient reports left-sided sensory deficits at baseline. Folate, B12, homocysteine, ammonia, TSH, and UA WNL. Toxicology screening shows +oxycodone.    Assessment: acute onset slurred speech and weakness secondary to TIA vs seizure vs benzodiazepine/opioid associated encephalopathy.    Plan:  [ ] MRI head w/o contrast   [ ] 24 hour video EEG  [ ] Continue Eliquis 5 mg BID  [ ] Continue Keppra 500 mg BID  [] seizure precautions

## 2020-10-28 NOTE — CHART NOTE - NSCHARTNOTEFT_GEN_A_CORE
EEG preliminary read (not final):  On the first hour of recording, no seizures recorded  Normal study in awake, drowsy and sleep states.   Final report to follow in morning tomorrow after completion of study.    Ger Guadalupe MD  Neurocritical Care Fellow EEG preliminary read (not final):  On the first hour of recording, no seizures recorded  Normal study in awake, drowsy and sleep states.   Final report to follow in morning tomorrow after completion of study.    Ger Guadalupe MD  Neurocritical Care Fellow    Josue Gomes MD  Epilepsy Attending

## 2020-10-29 ENCOUNTER — TRANSCRIPTION ENCOUNTER (OUTPATIENT)
Age: 70
End: 2020-10-29

## 2020-10-29 LAB
ANION GAP SERPL CALC-SCNC: 11 MMO/L — SIGNIFICANT CHANGE UP (ref 7–14)
BUN SERPL-MCNC: 10 MG/DL — SIGNIFICANT CHANGE UP (ref 7–23)
CALCIUM SERPL-MCNC: 9 MG/DL — SIGNIFICANT CHANGE UP (ref 8.4–10.5)
CHLORIDE SERPL-SCNC: 103 MMOL/L — SIGNIFICANT CHANGE UP (ref 98–107)
CO2 SERPL-SCNC: 26 MMOL/L — SIGNIFICANT CHANGE UP (ref 22–31)
CREAT SERPL-MCNC: 0.58 MG/DL — SIGNIFICANT CHANGE UP (ref 0.5–1.3)
GLUCOSE SERPL-MCNC: 93 MG/DL — SIGNIFICANT CHANGE UP (ref 70–99)
HCT VFR BLD CALC: 31.7 % — LOW (ref 34.5–45)
HCV AB S/CO SERPL IA: 0.09 S/CO — SIGNIFICANT CHANGE UP (ref 0–0.99)
HCV AB SERPL-IMP: SIGNIFICANT CHANGE UP
HGB BLD-MCNC: 10.1 G/DL — LOW (ref 11.5–15.5)
LEVETIRACETAM SERPL-MCNC: 17.3 UG/ML — SIGNIFICANT CHANGE UP (ref 10–40)
MAGNESIUM SERPL-MCNC: 2.1 MG/DL — SIGNIFICANT CHANGE UP (ref 1.6–2.6)
MCHC RBC-ENTMCNC: 30.9 PG — SIGNIFICANT CHANGE UP (ref 27–34)
MCHC RBC-ENTMCNC: 31.9 % — LOW (ref 32–36)
MCV RBC AUTO: 96.9 FL — SIGNIFICANT CHANGE UP (ref 80–100)
NRBC # FLD: 0 K/UL — SIGNIFICANT CHANGE UP (ref 0–0)
PHOSPHATE SERPL-MCNC: 3.3 MG/DL — SIGNIFICANT CHANGE UP (ref 2.5–4.5)
PLATELET # BLD AUTO: 243 K/UL — SIGNIFICANT CHANGE UP (ref 150–400)
PMV BLD: 10.2 FL — SIGNIFICANT CHANGE UP (ref 7–13)
POTASSIUM SERPL-MCNC: 3.5 MMOL/L — SIGNIFICANT CHANGE UP (ref 3.5–5.3)
POTASSIUM SERPL-SCNC: 3.5 MMOL/L — SIGNIFICANT CHANGE UP (ref 3.5–5.3)
RBC # BLD: 3.27 M/UL — LOW (ref 3.8–5.2)
RBC # FLD: 13.6 % — SIGNIFICANT CHANGE UP (ref 10.3–14.5)
SODIUM SERPL-SCNC: 140 MMOL/L — SIGNIFICANT CHANGE UP (ref 135–145)
WBC # BLD: 4.4 K/UL — SIGNIFICANT CHANGE UP (ref 3.8–10.5)
WBC # FLD AUTO: 4.4 K/UL — SIGNIFICANT CHANGE UP (ref 3.8–10.5)

## 2020-10-29 PROCEDURE — 70551 MRI BRAIN STEM W/O DYE: CPT | Mod: 26

## 2020-10-29 PROCEDURE — 99233 SBSQ HOSP IP/OBS HIGH 50: CPT

## 2020-10-29 PROCEDURE — 95720 EEG PHY/QHP EA INCR W/VEEG: CPT

## 2020-10-29 RX ADMIN — ATORVASTATIN CALCIUM 40 MILLIGRAM(S): 80 TABLET, FILM COATED ORAL at 21:30

## 2020-10-29 RX ADMIN — LEVETIRACETAM 500 MILLIGRAM(S): 250 TABLET, FILM COATED ORAL at 05:33

## 2020-10-29 RX ADMIN — APIXABAN 5 MILLIGRAM(S): 2.5 TABLET, FILM COATED ORAL at 18:08

## 2020-10-29 RX ADMIN — CARVEDILOL PHOSPHATE 12.5 MILLIGRAM(S): 80 CAPSULE, EXTENDED RELEASE ORAL at 18:08

## 2020-10-29 RX ADMIN — Medication 175 MICROGRAM(S): at 05:33

## 2020-10-29 RX ADMIN — OXYCODONE HYDROCHLORIDE 10 MILLIGRAM(S): 5 TABLET ORAL at 18:14

## 2020-10-29 RX ADMIN — LEVETIRACETAM 500 MILLIGRAM(S): 250 TABLET, FILM COATED ORAL at 18:08

## 2020-10-29 RX ADMIN — CARVEDILOL PHOSPHATE 12.5 MILLIGRAM(S): 80 CAPSULE, EXTENDED RELEASE ORAL at 05:33

## 2020-10-29 RX ADMIN — DULOXETINE HYDROCHLORIDE 60 MILLIGRAM(S): 30 CAPSULE, DELAYED RELEASE ORAL at 11:21

## 2020-10-29 RX ADMIN — APIXABAN 5 MILLIGRAM(S): 2.5 TABLET, FILM COATED ORAL at 05:33

## 2020-10-29 RX ADMIN — Medication 650 MILLIGRAM(S): at 11:21

## 2020-10-29 RX ADMIN — Medication 650 MILLIGRAM(S): at 12:00

## 2020-10-29 RX ADMIN — Medication 2 MILLIGRAM(S): at 11:21

## 2020-10-29 RX ADMIN — LAMOTRIGINE 300 MILLIGRAM(S): 25 TABLET, ORALLY DISINTEGRATING ORAL at 21:30

## 2020-10-29 RX ADMIN — Medication 81 MILLIGRAM(S): at 11:21

## 2020-10-29 RX ADMIN — Medication 2 MILLIGRAM(S): at 21:30

## 2020-10-29 NOTE — DISCHARGE NOTE PROVIDER - NSDCCPCAREPLAN_GEN_ALL_CORE_FT
PRINCIPAL DISCHARGE DIAGNOSIS  Diagnosis: Altered mental status  Assessment and Plan of Treatment: You were admitted for slurred speech and L sided weakness. CT of your head showed an old stroke. EEG did not show seizure.  MRI head w/o contrast ----      SECONDARY DISCHARGE DIAGNOSES  Diagnosis: Atrial fibrillation, unspecified type  Assessment and Plan of Treatment: You were having low heart rates so your Coreg was decreased to 12.5mg twice a day. Please continue to take Eliquis. Please follow up with your PCP.    Diagnosis: Depression, unspecified depression type  Assessment and Plan of Treatment: Psychiatry was consulted and recommended to continue with Cymbalta. Please follow up with [facility] psych clinic as needed.    Diagnosis: Hypothyroidism, unspecified type  Assessment and Plan of Treatment: Please continue taking Synthroid. Your thyroid stimulating hormone level was low but your free thyroxine level was normal. Please follow up with your PCP and continue taking current Synthroid dose.     PRINCIPAL DISCHARGE DIAGNOSIS  Diagnosis: Altered mental status  Assessment and Plan of Treatment: You were admitted for slurred speech and L sided weakness. CT of your head showed an old stroke. EEG did not show seizure.  MRI head w/o contrast was negative for stroke, bleed, or mass.      SECONDARY DISCHARGE DIAGNOSES  Diagnosis: Hypothyroidism, unspecified type  Assessment and Plan of Treatment: Please continue taking Synthroid. Your thyroid stimulating hormone level was low but your free thyroxine level was normal. Please follow up with your PCP and continue taking current Synthroid dose.    Diagnosis: Depression, unspecified depression type  Assessment and Plan of Treatment: Psychiatry was consulted and recommended to continue with Cymbalta. Please follow up with [facility] psych clinic as needed.    Diagnosis: Atrial fibrillation, unspecified type  Assessment and Plan of Treatment: You were having low heart rates so your Coreg was decreased to 12.5mg twice a day. Please continue to take Eliquis. Please follow up with your PCP.

## 2020-10-29 NOTE — EEG REPORT - NS EEG TEXT BOX
[facility]  Comprehensive Epilepsy Center  Report of Continuous Video EEG    [facility]: [address], [place] [address], Phone [phone]  [facility]: [address] [place], [place] [address], [place] [address], Phone [phone]  [place] Office: [address], [address], [place] [address] Phone [phone]    [facility]: [address] [place] [address], Phone [phone]  [place] Office: [address] [place] [address], Phone [phone]    Patient Name: SUZIE ELY    Age: 70 year, : 1950  Patient ID: -, MRN #: -, Shepherd: [id number]  Referring Physician: VICKY KYLE  EEG #: [id number]    Study Time/Date: 3:14:19 PM on 10/28/2020  	  End Time/Date: 08:00 AM on 10/29/2020          			   Duration: 16H 45min    Study Information:    EEG Recording Technique:  The patient underwent continuous Video-EEG monitoring, using Telemetry System hardware on the XLTek Digital System. EEG and video data were stored on a computer hard drive with important events saved in digital archive files. The material was reviewed by a physician (electroencephalographer / epileptologist) on a daily basis. Bob and seizure detection algorithms were utilized and reviewed. An EEG Technician attended to the patient, and was available throughout daytime work hours.  The epilepsy center neurologist was available in person or on call 24-hours per day.    EEG Placement and Labeling of Electrodes:  The EEG was performed utilizing 20 channel referential EEG connections (coronal over temporal over parasagittal montage) using all standard 10-20 electrode placements with EKG, with additional electrodes placed in the inferior temporal region using the modified 10-10 montage electrode placements for elective admissions, or if deemed necessary. Recording was at a sampling rate of 256 samples per second per channel. Time synchronized digital video recording was done simultaneously with EEG recording. A low light infrared camera was used for low light recording.     History:   VEEG DAY 1 AT BEDSIDE  71 YEAR OLD FEMALE  COR: AWAKE / DROWSY  PW; AMS  PMH:MUTERINE CA, SPINAL STENOSIS, A-FIB, STROKE, HLD, RA OA, SLE  HV: COMPLETED / NOT COMPLETED DUE TO AGE  PHOTIC: COMPLETED / NOT COMPLETED  A&OX  CONCERN FOR SEIZURES          Pertinent Medication  SYNTHROID  KEPPRA  LAMICTAL  NEURANTIN  Lasix  Cymbalta  Coreg  Lipitor  Aspirin  Eliquis    Interpretation:    Daily EEG Visual Analysis  Findings: The background was continuous, spontaneously variable and reactive. During wakefulness, the posterior dominant rhythm consisted of 8.5 Hz activity, with amplitude to 30 uV, that attenuated to eye opening.     Background Slowing:  No generalized background slowing was present.    Focal Slowing:   Intermittent theta/delta slowing in the right posterior head region.    Sleep Background:  Drowsiness was characterized by fragmentation, attenuation, and slowing of the background activity.    Sleep was characterized by the presence of vertex waves, symmetric sleep spindles and K-complexes.    Other Non-Epileptiform Findings:  None were present.    Interictal Epileptiform Activity:   None were present.    Events:  Clinical events: None recorded.  Seizures: None recorded.    Activation Procedures:   Hyperventilation was not performed.    Photic stimulation was not performed.     Artifacts:  Intermittent myogenic and movement artifacts were noted.    EEG Summary / Classification:  Abnormal EEG in the awake, drowsy and asleep states.  - Right posterior quadrant slowing.    EEG Impression / Clinical Correlate:  Abnormal EEG study.  Structural or functional abnormality in the right posterior head region.  No epileptiform pattern or seizure seen.    Kaushik Marques MD  Attending Physician, [facility]

## 2020-10-29 NOTE — PHYSICAL THERAPY INITIAL EVALUATION ADULT - PERTINENT HX OF CURRENT PROBLEM, REHAB EVAL
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 Admit to telemetry for r/o TIA vs CVA.

## 2020-10-29 NOTE — DISCHARGE NOTE PROVIDER - CARE PROVIDER_API CALL
INDRA RESENDEZ  [id number]  [address] [place] [address]  Phone: [phone]  Fax: [phone]  Follow Up Time:

## 2020-10-29 NOTE — PHYSICAL THERAPY INITIAL EVALUATION ADULT - GENERAL OBSERVATIONS, REHAB EVAL
Patient received seated at the edge of bed , (+) EEG ,(+) tele monitor , R knock knee ,in no apparent distress.

## 2020-10-29 NOTE — DISCHARGE NOTE PROVIDER - CARE PROVIDERS DIRECT ADDRESSES
[email or web address]

## 2020-10-29 NOTE — PROGRESS NOTE ADULT - ASSESSMENT
70F Aortic arch aneurysm repair s/p AVR c/b CVA and SZ D/O, Depression/anxiety, HTN, hypothyroid, Afib on Eliquis, RA, OA, SLE, spinal stenosis - chronic opioid pain control and wheelchair bound, Uterine CA s/p TAHBSO p/w acute encephalopathy concern for new CVA vs exacerbation of anxiety.

## 2020-10-29 NOTE — PHYSICAL THERAPY INITIAL EVALUATION ADULT - ADDITIONAL COMMENTS
As per patient . she is minimally ambulatory , only walks from bed to the bathroom at home and uses wheelchair for outdoors and waiting to have R knee and left hip replacement and ambulates with unsteady gait due to this for a while.  X-ray left shoulder - No fractures, dislocations, or AC separation.10/27/20  CT Brain - No acute intracranial hemorrhage or mass effect. 10/27/20

## 2020-10-29 NOTE — DISCHARGE NOTE PROVIDER - NSDCFUADDINST_GEN_ALL_CORE_FT
Please follow up outpatient with private Neurologist, or can follow up with Epilepsy clinic at [phone].  Call to make an appointment.

## 2020-10-29 NOTE — EEG REPORT - NS EEG TEXT BOX
[facility]  Comprehensive Epilepsy Center  Report of Continuous Video EEG    [facility]: [address], [place] [address], Phone [phone]  [facility]: [address] [place], [place] [address] [address], Phone [phone]  [place] Office: [address], [address], [place] [address] Phone [phone]    [facility]: [address] [place] [address], Phone [phone]  [place] Office: [address] [place] [address], Phone [phone]    Patient Name: SUZIE ELY    Age: 70 year, : 1950  Patient ID: -, MRN #: -, Shepherd: [id number]  Referring Physician: VICKY KYLE    Study Time/Date: 08:00AM on 10/29/2020  	  End Time/Date: 12:40 PM on 10/29/2020          			   Duration: 4H 40min    Study Information:    EEG Recording Technique:  The patient underwent continuous Video-EEG monitoring, using Telemetry System hardware on the XLTek Digital System. EEG and video data were stored on a computer hard drive with important events saved in digital archive files. The material was reviewed by a physician (electroencephalographer / epileptologist) on a daily basis. Bob and seizure detection algorithms were utilized and reviewed. An EEG Technician attended to the patient, and was available throughout daytime work hours.  The epilepsy center neurologist was available in person or on call 24-hours per day.    EEG Placement and Labeling of Electrodes:  The EEG was performed utilizing 20 channel referential EEG connections (coronal over temporal over parasagittal montage) using all standard 10-20 electrode placements with EKG, with additional electrodes placed in the inferior temporal region using the modified 10-10 montage electrode placements for elective admissions, or if deemed necessary. Recording was at a sampling rate of 256 samples per second per channel. Time synchronized digital video recording was done simultaneously with EEG recording. A low light infrared camera was used for low light recording.     History:   VEEG DAY 1 AT BEDSIDE  71 YEAR OLD FEMALE  COR: AWAKE / DROWSY  PW; AMS  PMH:MUTERINE CA, SPINAL STENOSIS, A-FIB, STROKE, HLD, RA OA, SLE  HV: COMPLETED / NOT COMPLETED DUE TO AGE  PHOTIC: COMPLETED / NOT COMPLETED  A&OX  CONCERN FOR SEIZURES          Pertinent Medication  SYNTHROID  KEPPRA  LAMICTAL  NEURANTIN  Lasix  Cymbalta  Coreg  Lipitor  Aspirin  Eliquis    Interpretation:    Daily EEG Visual Analysis  Findings: The background was continuous, spontaneously variable and reactive. During wakefulness, the posterior dominant rhythm consisted of 8.5 Hz activity, with amplitude to 30 uV, that attenuated to eye opening.     Background Slowing:  No generalized background slowing was present.    Focal Slowing:   Intermittent theta/delta slowing in the right posterior head region.    Sleep Background:  Drowsiness was characterized by fragmentation, attenuation, and slowing of the background activity.    Sleep was characterized by the presence of vertex waves, symmetric sleep spindles and K-complexes.    Other Non-Epileptiform Findings:  None were present.    Interictal Epileptiform Activity:   None were present.    Events:  Clinical events: None recorded.  Seizures: None recorded.    Activation Procedures:   Hyperventilation was not performed.    Photic stimulation was not performed.     Artifacts:  Intermittent myogenic and movement artifacts were noted.    EEG Summary / Classification:  Abnormal EEG in the awake, drowsy and asleep states.  - Right posterior quadrant slowing.    EEG Impression / Clinical Correlate:  Abnormal EEG study.  Structural or functional abnormality in the right posterior head region.  No epileptiform pattern or seizure seen.    Kaushik Marques MD  Attending Physician, [facility] Epilepsy Center

## 2020-10-29 NOTE — DISCHARGE NOTE PROVIDER - HOSPITAL COURSE
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 CXR mild pulmonary edema. CTH showed old R parieto-temporal infarct. CTA H & N w/o infarct/hemorrage/aneurysm. Vit B12, folate, ammonia, homocysteine all WNL. F/U methylmalonic acid. Urine tox screen + oxycodone + PCP. UA w/ elev specific gravity, Lasix held. EEG showed R temporal slowing, likely due to chronic infarct in that area. Low suspicion for seizure.   MRI head w/o contrast ----  Neurology following and stated acute onset slurred speech and weakness secondary to TIA vs seizure vs benzodiazepine/opioid associated encephalopathy.    Depression- psych consulted- c/w Cymbalta. Signing off c/w routine obs, no c/i to d/c    Afib [id number] Score 5- Decreased coreg dose to 12.5mg BID given episodes of bradycardia. c/w Eliquis    Hypothyroidism- c/w synthroid. TSH low, free thyroxine WNL. 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 CXR mild pulmonary edema. CTH showed old R parieto-temporal infarct. CTA H & N w/o infarct/hemorrage/aneurysm. Vit B12, folate, ammonia, homocysteine all WNL. F/U methylmalonic acid. Urine tox screen + oxycodone + PCP. UA w/ elev specific gravity, Lasix held. EEG showed R temporal slowing, likely due to chronic infarct in that area. Low suspicion for seizure.   MRI head w/o contrast ----No acute infarct, hemorrhage, or mass effect.    Neurology following and stated acute onset slurred speech and weakness secondary to TIA vs seizure vs benzodiazepine/opioid associated encephalopathy.    Depression- psych consulted- c/w Cymbalta. Signing off c/w routine obs, no c/i to d/c    Afib [id number] Score 5- Decreased coreg dose to 12.5mg BID given episodes of bradycardia. c/w Eliquis    Hypothyroidism- c/w synthroid. TSH low, free thyroxine WNL. 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 CXR mild pulmonary edema. CTH showed old R parieto-temporal infarct. CTA H & N w/o infarct/hemorrage/aneurysm. Vit B12, folate, ammonia, homocysteine all WNL. F/U methylmalonic acid. Urine tox screen + oxycodone + PCP. UA w/ elev specific gravity, Lasix held. EEG showed R temporal slowing, likely due to chronic infarct in that area. Low suspicion for seizure.   MRI head w/o contrast ----No acute infarct, hemorrhage, or mass effect.    Neurology following and stated acute onset slurred speech and weakness secondary to TIA vs seizure vs benzodiazepine/opioid associated encephalopathy.    Depression- psych consulted- c/w Cymbalta. Signing off c/w routine obs, no c/i to d/c    Afib [id number] Score 5- Decreased coreg dose to 12.5mg BID given episodes of bradycardia. c/w Eliquis    Hypothyroidism- c/w synthroid. TSH low, free thyroxine WNL. Outpt f/u with PCP    Patient remained stable and is now clear for discharge home, no needs.

## 2020-10-29 NOTE — PROGRESS NOTE ADULT - SUBJECTIVE AND OBJECTIVE BOX
[facility] Division of Hospital Medicine  Felipe Calderon MD  Pager (KATIE-F, 8A-5P): [phone]  Other Times:  [id number]    Patient is a 70y old  Female who presents with a chief complaint of slurred speech (28 Oct 2020 13:30)    SUBJECTIVE / OVERNIGHT EVENTS:  Patient offers no new complaints.  Tolerated vEEG well. Awaiting MRI of Brain.  No F/C, N/V, CP, SOB, Cough, lightheadedness, dizziness, abdominal pain, diarrhea, dysuria.    MEDICATIONS  (STANDING):  apixaban 5 milliGRAM(s) Oral every 12 hours  aspirin enteric coated 81 milliGRAM(s) Oral daily  atorvastatin 40 milliGRAM(s) Oral at bedtime  carvedilol 12.5 milliGRAM(s) Oral every 12 hours  DULoxetine 60 milliGRAM(s) Oral daily  lamoTRIgine 300 milliGRAM(s) Oral at bedtime  levETIRAcetam 500 milliGRAM(s) Oral two times a day  levothyroxine 175 MICROGram(s) Oral daily  sodium chloride 0.9%. 1000 milliLiter(s) (75 mL/Hr) IV Continuous <Continuous>    MEDICATIONS  (PRN):  acetaminophen   Tablet .. 650 milliGRAM(s) Oral every 6 hours PRN Mild Pain (1 - 3)  gabapentin 300 milliGRAM(s) Oral three times a day PRN pain  LORazepam     Tablet 2 milliGRAM(s) Oral every 8 hours PRN Anxiety  oxyCODONE    IR 10 milliGRAM(s) Oral every 6 hours PRN moderate and severe pain      Vital Signs Last 24 Hrs  T(C): 36.8 (29 Oct 2020 05:31), Max: 36.8 (29 Oct 2020 05:31)  T(F): 98.2 (29 Oct 2020 05:31), Max: 98.2 (29 Oct 2020 05:31)  HR: 60 (29 Oct 2020 05:31) (60 - 62)  BP: 136/65 (29 Oct 2020 05:31) (136/65 - 155/58)  BP(mean): --  RR: 16 (29 Oct 2020 05:31) (16 - 17)  SpO2: 97% (29 Oct 2020 05:31) (97% - 100%)  CAPILLARY BLOOD GLUCOSE        I&O's Summary    28 Oct 2020 07:01  -  29 Oct 2020 07:00  --------------------------------------------------------  IN: 275 mL / OUT: 600 mL / NET: -325 mL        PHYSICAL EXAM:  GENERAL: NAD  HEAD:  Atraumatic, Normocephalic  EYES: EOMI, PERRLA, conjunctiva and sclera clear  NECK: Supple, No JVD  CHEST/LUNG: Clear to auscultation bilaterally; No wheeze  HEART: Regular rate and rhythm; No murmurs, rubs, or gallops  ABDOMEN: Soft, Nontender, Nondistended; Bowel sounds present  EXTREMITIES:  2+ Peripheral Pulses, No clubbing, cyanosis, or edema  PSYCH: Anxious  NEUROLOGY: A/Ox3, left hemiparesis  SKIN: Multiple tattoos present    LABS:                        10.1   4.40  )-----------( 243      ( 29 Oct 2020 04:50 )             31.7     10-29    140  |  103  |  10  ----------------------------<  93  3.5   |  26  |  0.58    Ca    9.0      29 Oct 2020 04:50  Phos  3.3     10-29  Mg     2.1     10-29                RADIOLOGY & ADDITIONAL TESTS:    Imaging Personally Reviewed:    Care Discussed with Consultants/Other Providers:

## 2020-10-29 NOTE — CHART NOTE - NSCHARTNOTEFT_GEN_A_CORE
EEG shows R temporal slowing, likely due to chronic infarct in that area. Low suspicion for seizure.     Recommend:   - can d/c EEG  - will f/u MRI brain

## 2020-10-29 NOTE — PHYSICAL THERAPY INITIAL EVALUATION ADULT - ACTIVE RANGE OF MOTION EXAMINATION, REHAB EVAL
except active assisted  left shoulder flexion 0- 80 degrees/bilateral upper extremity Active ROM was WFL (within functional limits)/bilateral  lower extremity Active ROM was WFL (within functional limits)

## 2020-10-30 ENCOUNTER — TRANSCRIPTION ENCOUNTER (OUTPATIENT)
Age: 70
End: 2020-10-30

## 2020-10-30 VITALS — HEART RATE: 67 BPM | SYSTOLIC BLOOD PRESSURE: 144 MMHG | DIASTOLIC BLOOD PRESSURE: 78 MMHG

## 2020-10-30 LAB
ANION GAP SERPL CALC-SCNC: 10 MMO/L — SIGNIFICANT CHANGE UP (ref 7–14)
BUN SERPL-MCNC: 9 MG/DL — SIGNIFICANT CHANGE UP (ref 7–23)
CALCIUM SERPL-MCNC: 9.1 MG/DL — SIGNIFICANT CHANGE UP (ref 8.4–10.5)
CHLORIDE SERPL-SCNC: 104 MMOL/L — SIGNIFICANT CHANGE UP (ref 98–107)
CO2 SERPL-SCNC: 27 MMOL/L — SIGNIFICANT CHANGE UP (ref 22–31)
CREAT SERPL-MCNC: 0.64 MG/DL — SIGNIFICANT CHANGE UP (ref 0.5–1.3)
GLUCOSE SERPL-MCNC: 91 MG/DL — SIGNIFICANT CHANGE UP (ref 70–99)
HCT VFR BLD CALC: 35.6 % — SIGNIFICANT CHANGE UP (ref 34.5–45)
HGB BLD-MCNC: 11.3 G/DL — LOW (ref 11.5–15.5)
LEVETIRACETAM SERPL-MCNC: 18.2 UG/ML — SIGNIFICANT CHANGE UP (ref 10–40)
MAGNESIUM SERPL-MCNC: 2.1 MG/DL — SIGNIFICANT CHANGE UP (ref 1.6–2.6)
MCHC RBC-ENTMCNC: 31.4 PG — SIGNIFICANT CHANGE UP (ref 27–34)
MCHC RBC-ENTMCNC: 31.7 % — LOW (ref 32–36)
MCV RBC AUTO: 98.9 FL — SIGNIFICANT CHANGE UP (ref 80–100)
NRBC # FLD: 0 K/UL — SIGNIFICANT CHANGE UP (ref 0–0)
PHOSPHATE SERPL-MCNC: 3.7 MG/DL — SIGNIFICANT CHANGE UP (ref 2.5–4.5)
PLATELET # BLD AUTO: 264 K/UL — SIGNIFICANT CHANGE UP (ref 150–400)
PMV BLD: 9.7 FL — SIGNIFICANT CHANGE UP (ref 7–13)
POTASSIUM SERPL-MCNC: 3.3 MMOL/L — LOW (ref 3.5–5.3)
POTASSIUM SERPL-SCNC: 3.3 MMOL/L — LOW (ref 3.5–5.3)
RBC # BLD: 3.6 M/UL — LOW (ref 3.8–5.2)
RBC # FLD: 13.6 % — SIGNIFICANT CHANGE UP (ref 10.3–14.5)
SODIUM SERPL-SCNC: 141 MMOL/L — SIGNIFICANT CHANGE UP (ref 135–145)
WBC # BLD: 4.96 K/UL — SIGNIFICANT CHANGE UP (ref 3.8–10.5)
WBC # FLD AUTO: 4.96 K/UL — SIGNIFICANT CHANGE UP (ref 3.8–10.5)

## 2020-10-30 PROCEDURE — 99239 HOSP IP/OBS DSCHRG MGMT >30: CPT

## 2020-10-30 RX ORDER — GABAPENTIN 400 MG/1
1 CAPSULE ORAL
Qty: 90 | Refills: 0
Start: 2020-10-30 | End: 2020-11-28

## 2020-10-30 RX ORDER — LEVOTHYROXINE SODIUM 125 MCG
1 TABLET ORAL
Qty: 0 | Refills: 0 | DISCHARGE

## 2020-10-30 RX ORDER — APIXABAN 2.5 MG/1
1 TABLET, FILM COATED ORAL
Qty: 0 | Refills: 0 | DISCHARGE

## 2020-10-30 RX ORDER — POTASSIUM CHLORIDE 20 MEQ
40 PACKET (EA) ORAL EVERY 4 HOURS
Refills: 0 | Status: COMPLETED | OUTPATIENT
Start: 2020-10-30 | End: 2020-10-30

## 2020-10-30 RX ORDER — CARVEDILOL PHOSPHATE 80 MG/1
1 CAPSULE, EXTENDED RELEASE ORAL
Qty: 0 | Refills: 0 | DISCHARGE

## 2020-10-30 RX ORDER — LAMOTRIGINE 25 MG/1
1 TABLET, ORALLY DISINTEGRATING ORAL
Qty: 0 | Refills: 0 | DISCHARGE

## 2020-10-30 RX ORDER — ATORVASTATIN CALCIUM 80 MG/1
1 TABLET, FILM COATED ORAL
Qty: 0 | Refills: 0 | DISCHARGE

## 2020-10-30 RX ORDER — CARVEDILOL PHOSPHATE 80 MG/1
1 CAPSULE, EXTENDED RELEASE ORAL
Qty: 60 | Refills: 0
Start: 2020-10-30 | End: 2020-11-28

## 2020-10-30 RX ORDER — FUROSEMIDE 40 MG
1 TABLET ORAL
Qty: 0 | Refills: 0 | DISCHARGE

## 2020-10-30 RX ORDER — LEVETIRACETAM 250 MG/1
1 TABLET, FILM COATED ORAL
Qty: 0 | Refills: 0 | DISCHARGE

## 2020-10-30 RX ORDER — POTASSIUM CHLORIDE 20 MEQ
1 PACKET (EA) ORAL
Qty: 0 | Refills: 0 | DISCHARGE

## 2020-10-30 RX ADMIN — Medication 650 MILLIGRAM(S): at 06:34

## 2020-10-30 RX ADMIN — APIXABAN 5 MILLIGRAM(S): 2.5 TABLET, FILM COATED ORAL at 17:40

## 2020-10-30 RX ADMIN — LEVETIRACETAM 500 MILLIGRAM(S): 250 TABLET, FILM COATED ORAL at 17:40

## 2020-10-30 RX ADMIN — DULOXETINE HYDROCHLORIDE 60 MILLIGRAM(S): 30 CAPSULE, DELAYED RELEASE ORAL at 11:40

## 2020-10-30 RX ADMIN — Medication 40 MILLIEQUIVALENT(S): at 14:44

## 2020-10-30 RX ADMIN — Medication 40 MILLIEQUIVALENT(S): at 12:39

## 2020-10-30 RX ADMIN — APIXABAN 5 MILLIGRAM(S): 2.5 TABLET, FILM COATED ORAL at 06:33

## 2020-10-30 RX ADMIN — Medication 2 MILLIGRAM(S): at 06:33

## 2020-10-30 RX ADMIN — CARVEDILOL PHOSPHATE 12.5 MILLIGRAM(S): 80 CAPSULE, EXTENDED RELEASE ORAL at 06:33

## 2020-10-30 RX ADMIN — Medication 175 MICROGRAM(S): at 06:33

## 2020-10-30 RX ADMIN — Medication 81 MILLIGRAM(S): at 11:40

## 2020-10-30 RX ADMIN — CARVEDILOL PHOSPHATE 12.5 MILLIGRAM(S): 80 CAPSULE, EXTENDED RELEASE ORAL at 17:40

## 2020-10-30 RX ADMIN — Medication 2 MILLIGRAM(S): at 14:48

## 2020-10-30 RX ADMIN — LEVETIRACETAM 500 MILLIGRAM(S): 250 TABLET, FILM COATED ORAL at 06:33

## 2020-10-30 RX ADMIN — Medication 650 MILLIGRAM(S): at 07:05

## 2020-10-30 NOTE — PROGRESS NOTE ADULT - SUBJECTIVE AND OBJECTIVE BOX
[facility] Division of Hospital Medicine  Felipe Calderon MD  Pager (KATIE-F, 8A-5P): [phone]  Other Times:  [phone]    Patient is a 70y old  Female who presents with a chief complaint of AMS (29 Oct 2020 11:05)    SUBJECTIVE / OVERNIGHT EVENTS:  Patient offers no new complaints.  No F/C, N/V, CP, SOB, Cough, lightheadedness, dizziness, abdominal pain, diarrhea, dysuria.    MEDICATIONS  (STANDING):  apixaban 5 milliGRAM(s) Oral every 12 hours  aspirin enteric coated 81 milliGRAM(s) Oral daily  atorvastatin 40 milliGRAM(s) Oral at bedtime  carvedilol 12.5 milliGRAM(s) Oral every 12 hours  DULoxetine 60 milliGRAM(s) Oral daily  lamoTRIgine 300 milliGRAM(s) Oral at bedtime  levETIRAcetam 500 milliGRAM(s) Oral two times a day  levothyroxine 175 MICROGram(s) Oral daily  potassium chloride    Tablet ER 40 milliEquivalent(s) Oral every 4 hours  sodium chloride 0.9%. 1000 milliLiter(s) (75 mL/Hr) IV Continuous <Continuous>    MEDICATIONS  (PRN):  acetaminophen   Tablet .. 650 milliGRAM(s) Oral every 6 hours PRN Mild Pain (1 - 3)  gabapentin 300 milliGRAM(s) Oral three times a day PRN pain  LORazepam     Tablet 2 milliGRAM(s) Oral every 8 hours PRN Anxiety  oxyCODONE    IR 10 milliGRAM(s) Oral every 6 hours PRN moderate and severe pain      Vital Signs Last 24 Hrs  T(C): 36.8 (30 Oct 2020 06:23), Max: 36.9 (29 Oct 2020 21:28)  T(F): 98.3 (30 Oct 2020 06:23), Max: 98.5 (29 Oct 2020 21:28)  HR: 62 (30 Oct 2020 06:23) (62 - 70)  BP: 140/76 (30 Oct 2020 06:23) (131/60 - 173/83)  BP(mean): --  RR: 18 (30 Oct 2020 06:23) (18 - 18)  SpO2: 97% (30 Oct 2020 06:23) (97% - 100%)  CAPILLARY BLOOD GLUCOSE        I&O's Summary    29 Oct 2020 07:01  -  30 Oct 2020 07:00  --------------------------------------------------------  IN: 125 mL / OUT: 175 mL / NET: -50 mL        PHYSICAL EXAM:  GENERAL: NAD  HEAD:  Atraumatic, Normocephalic  EYES: EOMI, PERRLA, conjunctiva and sclera clear  NECK: Supple, No JVD  CHEST/LUNG: Clear to auscultation bilaterally; No wheeze  HEART: Regular rate and rhythm; No murmurs, rubs, or gallops  ABDOMEN: Soft, Nontender, Nondistended; Bowel sounds present  EXTREMITIES:  2+ Peripheral Pulses, No clubbing, cyanosis, or edema  PSYCH: Anxious  NEUROLOGY: A/Ox3, left hemiparesis  SKIN: Multiple tattoos present    LABS:                        11.3   4.96  )-----------( 264      ( 30 Oct 2020 07:11 )             35.6     10-30    141  |  104  |  9   ----------------------------<  91  3.3<L>   |  27  |  0.64    Ca    9.1      30 Oct 2020 07:11  Phos  3.7     10-30  Mg     2.1     10-30                RADIOLOGY & ADDITIONAL TESTS:  < from: MR Head No Cont (10.29.20 @ 17:43) >  FINDINGS:    Encephalomalacia and gliosis in the right posterior temporal/occipital region suggesting chronic infarct. There is no evidence of acute infarct. A few foci of susceptibility artifact in the bilateral cerebral hemispheres and right cerebellum suggesting chronic microhemorrhages. Scattered foci of T2/FLAIR hyperintensity in the bilateral hemispheric white matter are nonspecific but likely related to chronic white matter microvascular ischemic disease. There is cerebral volume loss.    Flow voids of the major intracranial vessels at the skull base follow expected course and contour.    The paranasal sinusesdemonstrate no signal abnormality. The mastoids demonstrate no signal abnormality.  Status post bilateral intraocular lens implants.      IMPRESSION: No acute infarct, hemorrhage, or mass effect.              DINAH MONTIEL MD; Attending Radiologist  Thisdocument has been electronically signed. Oct 29 2020  6:22PM    < end of copied text >    Imaging Personally Reviewed:    Care Discussed with Consultants/Other Providers:

## 2020-10-30 NOTE — CHART NOTE - NSCHARTNOTEFT_GEN_A_CORE
EEG Summary / Classification:  Abnormal EEG in the awake, drowsy and asleep states.  - Right posterior quadrant slowing.    EEG Impression / Clinical Correlate:  Abnormal EEG study.  Structural or functional abnormality in the right posterior head region.  No epileptiform pattern or seizure seen.       < from: MR Head No Cont (10.29.20 @ 17:43) >      Encephalomalacia and gliosis in the right posterior temporal/occipital region suggesting chronic infarct. There is no evidence of acute infarct. A few foci of susceptibility artifact in the bilateral cerebral hemispheres and right cerebellum suggesting chronic microhemorrhages. Scattered foci of T2/FLAIR hyperintensity in the bilateral hemispheric white matter are nonspecific but likely related to chronic white matter microvascular ischemic disease. There is cerebral volume loss.    < end of copied text >        Video EEG reviewed and is negative. Functional abnormality in R parietal is related to old stroke. MRI shows chronic R temporal/occipital infarct and chronic microhemorrhages likely in setting of long standing hypertension.  Suspect possible toxic-metabolic etiology for altered awareness. Low suspicion for seizure.    Recommend:   - Will continue current dose of Keppra at 500mg BID and Lamictal 300mg qhs.    - Please continue Eliquis for Afib.    - Please verify if patient requires ASA from Cardiac standpoint as there is no neurovascular indication for ASA as patient is already on Eliquis. If there is no cardiac indication, would recommend stopping ASA.     Can follow up outpatient with private Neurologist, or can follow up with Epilepsy clinic at [phone]

## 2020-10-30 NOTE — DISCHARGE NOTE NURSING/CASE MANAGEMENT/SOCIAL WORK - NSDCPEPTSTRK_GEN_ALL_CORE
Prescribed medications/Stroke support groups for patients, families, and friends/Stroke warning signs and symptoms/Signs and symptoms of stroke/Call 911 for stroke/Stroke education booklet/Need for follow up after discharge/Risk factors for stroke

## 2020-10-30 NOTE — DISCHARGE NOTE NURSING/CASE MANAGEMENT/SOCIAL WORK - PATIENT PORTAL LINK FT
You can access the FollowMyHealth Patient Portal offered by [facility] by registering at the following website: [email or web address]. By joining [employer]’s FollowMyHealth portal, you will also be able to view your health information using other applications (apps) compatible with our system.

## 2020-10-31 LAB — METHYLMALONATE SERPL-SCNC: 232 NMOL/L — SIGNIFICANT CHANGE UP (ref 0–378)

## 2021-03-15 NOTE — H&P ADULT - ATTENDING COMMENTS
Statement Selected Transitions of Care Status:  1.  Name of PCP: Dr. Montana   2.  PCP Contacted on Admission: [ ] Y    [ x] N    3.  PCP contacted at Discharge: [ ] Y    [ ] N    [ ] N/A  4.  Post-Discharge Appointment Date and Location:  5.  Summary of Handoff given to PCP:    discussed with [facility] [place] [id number]    Shala Rodgers D.O.  Hospitalist Pager # [phone]

## 2021-06-07 NOTE — DISCHARGE NOTE NURSING/CASE MANAGEMENT/SOCIAL WORK - NSDPLANG ASIS_GEN_ALL_CORE
Procedure:  CARDIAC EPS/PACER IMPLANT    Relevant Problems   CARDIO   (+) Atrial fibrillation (HCC)   (+) Benign essential hypertension   (+) RICHARDS (dyspnea on exertion)   (+) Mixed hyperlipidemia      ENDO   (+) Acquired hypothyroidism      GYN   (+) Recurrent malignant neoplasm of right breast (HCC)      NEURO/PSYCH   (+) Encounter for follow-up surveillance of breast cancer   (+) Personal history of adenocarcinoma of breast      PULMONARY   (+) RICHARDS (dyspnea on exertion)   (+) SOB (shortness of breath)      Other   (+) BPPV (benign paroxysmal positional vertigo)   (+) Bilateral edema of lower extremity   (+) Cardiomyopathy (HCC)   (+) Cervical spinal stenosis   (+) Class 1 obesity due to excess calories without serious comorbidity with body mass index (BMI) of 31 0 to 31 9 in adult   (+) Long QT interval      eliquis taken this AM as per surgeon  Diuretic not taken by patient today, pt c/o swelling of her lower extremities  INR 1 5 today    Physical Exam    Airway    Mallampati score: III  TM Distance: >3 FB  Neck ROM: full     Dental   No notable dental hx     Cardiovascular      Pulmonary      Other Findings        Anesthesia Plan  ASA Score- 3     Anesthesia Type- IV sedation with anesthesia with ASA Monitors  Additional Monitors:   Airway Plan:     Comment: IV sedation, Antiemetics, IV access  Plan Factors-    Chart reviewed  EKG reviewed  Existing labs reviewed  Patient is not a current smoker  Patient did not smoke on day of surgery  Induction- intravenous  Postoperative Plan-     Informed Consent- Anesthetic plan and risks discussed with patient  I personally reviewed this patient with the CRNA  Discussed and agreed on the Anesthesia Plan with the CRNA  Matheus Guilford
No

## 2021-06-11 ENCOUNTER — EMERGENCY (EMERGENCY)
Facility: HOSPITAL | Age: 71
LOS: 1 days | Discharge: HOME | End: 2021-06-11
Attending: EMERGENCY MEDICINE | Admitting: EMERGENCY MEDICINE
Payer: MEDICARE

## 2021-06-11 VITALS
SYSTOLIC BLOOD PRESSURE: 159 MMHG | DIASTOLIC BLOOD PRESSURE: 79 MMHG | HEIGHT: 66 IN | RESPIRATION RATE: 16 BRPM | TEMPERATURE: 98 F | HEART RATE: 87 BPM | OXYGEN SATURATION: 98 %

## 2021-06-11 DIAGNOSIS — Z90.710 ACQUIRED ABSENCE OF BOTH CERVIX AND UTERUS: Chronic | ICD-10-CM

## 2021-06-11 DIAGNOSIS — Z98.51 TUBAL LIGATION STATUS: Chronic | ICD-10-CM

## 2021-06-11 DIAGNOSIS — Z98.890 OTHER SPECIFIED POSTPROCEDURAL STATES: Chronic | ICD-10-CM

## 2021-06-11 DIAGNOSIS — Z90.79 ACQUIRED ABSENCE OF OTHER GENITAL ORGAN(S): Chronic | ICD-10-CM

## 2021-06-11 DIAGNOSIS — Z98.49 CATARACT EXTRACTION STATUS, UNSPECIFIED EYE: Chronic | ICD-10-CM

## 2021-06-11 DIAGNOSIS — Z98.891 HISTORY OF UTERINE SCAR FROM PREVIOUS SURGERY: Chronic | ICD-10-CM

## 2021-06-11 PROCEDURE — 99284 EMERGENCY DEPT VISIT MOD MDM: CPT | Mod: 25

## 2021-06-11 PROCEDURE — 30903 CONTROL OF NOSEBLEED: CPT

## 2021-06-11 RX ORDER — ONDANSETRON 8 MG/1
4 TABLET, FILM COATED ORAL ONCE
Refills: 0 | Status: COMPLETED | OUTPATIENT
Start: 2021-06-11 | End: 2021-06-11

## 2021-06-11 NOTE — ED PROVIDER NOTE - NSFOLLOWUPCLINICS_GEN_ALL_ED_FT
[facility] - ENT  Otolaryngology (ENT)  [address] [place] [address]  Phone: [phone]  Fax:

## 2021-06-11 NOTE — ED PROVIDER NOTE - ATTENDING CONTRIBUTION TO CARE
Attending note:   After face to face evaluation of this patient, I concur with above noted hx, pe, and care plan for this patient.  Guajardo: 70 yof with multiple medical issues complaining of nose bleed from right side for few hours. Pt unable to control it at home. Attending note:   After face to face evaluation of this patient, I concur with above noted hx, pe, and care plan for this patient.  Guajardo: 70 yof with multiple medical issues complaining of nose bleed from right side for few hours. Pt unable to control it at home. On exam, pt appears very anxious, rhino rocket in place in right nare, small amount of oozing around, small amount of bleeding in posterior pharynx as well, no active bleeding left, rest of exam as noted. Control bleeding, benzos for anxiety, hopefully BP will improve and help control bleeding with it. Labs and observe.

## 2021-06-11 NOTE — ED PROVIDER NOTE - PSH
H/O bilateral salpingo-oophorectomy    H/O thoracic aortic aneurysm repair    H/O total hysterectomy    H/O tubal ligation    H/O:   x2  History of cataract surgery    History of hip surgery    History of total abdominal hysterectomy

## 2021-06-11 NOTE — ED PROVIDER NOTE - NS ED ROS FT
ROS:  GENERAL: No fever, no chills  EYES: no change in vision  HEENT: +epistaxis. no trouble swallowing, no trouble speaking  CARDIAC: no chest pain  PULMONARY: no cough, no shortness of breath  GI: no abdominal pain, no nausea, no vomiting, no diarrhea, no constipation  : No dysuria, no frequency, no change in appearance, or odor of urine  SKIN: no rashes  NEURO: no headache, no weakness  MSK: No joint pain    Thaddeus Randle PGY3

## 2021-06-11 NOTE — ED PROVIDER NOTE - NSFOLLOWUPINSTRUCTIONS_ED_ALL_ED_FT
Follow up with your PCP in 24-48 hours.   Call ENT to make a follow up appointment.   Keep nasal packing in nose for 3 days (no longer) until follow up with ENT.   Take Augmentin twice per day for 3 days (while nasal packing is in place).  May take Tylenol and Motrin as directed on the bottle for pain control.   Return to the ER if you develop any new or worsening symptoms such as fever, nose bleeding, chest pain, shortness of breath, numbness, weakness, abdominal pain, nausea, vomiting, or visual changes.

## 2021-06-11 NOTE — ED ADULT NURSE NOTE - PRO INTERPRETER NEED 2
-cont oxygen  -cont nebs, IV steroids, -cont oxygen  -cont nebs, taper to po steroids -cont oxygen  -cont nebs, taper to po steroids on steroids, nebs ,pulmonary following, likely the major cause of respiratory decompensation w re-workup as outpt -cont oxygen  -cont nebs, IV steroids, English

## 2021-06-11 NOTE — ED PROVIDER NOTE - OBJECTIVE STATEMENT
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 From right nostril. Endorses nausea. Denies any other symptoms including fever, HA, chest pain, SOB, abd pain, N/V/D.

## 2021-06-11 NOTE — ED ADULT NURSE NOTE - OBJECTIVE STATEMENT
alert oriented no distress  epistaxis noted  right nares rhino rocket inserted by resident   still with blood trickling around it   no c/o chest pain SOB or dizziness  Blood Pressure elevated Dr Guajardo with patient at present and aware of VS

## 2021-06-11 NOTE — ED PROVIDER NOTE - CLINICAL SUMMARY MEDICAL DECISION MAKING FREE TEXT BOX
Epistaxis in pt on Eliquis. Endorses nausea but denies any other symptoms. Pt NAD, protecting airway, HD stable. Afrin + rhino rocket placed on arrival to ED. Now mostly hemostatic. Will check hgb/coags and observe for recurrence of bleeding.

## 2021-06-11 NOTE — ED PROVIDER NOTE - PROGRESS NOTE DETAILS
Pt feeling well, no further bleeding. Will dc on abx to f/u with ENT. Strict return precautions given.

## 2021-06-11 NOTE — ED PROVIDER NOTE - PHYSICAL EXAMINATION
Gen: AAOx3, non-toxic  Head: NCAT  HEENT: +bleeding from right naris. EOMI, oral mucosa moist, normal conjunctiva  Lung: CTAB, no respiratory distress, no wheezes/rhonchi/rales B/L, speaking in full sentences  CV: RRR, no murmurs, rubs or gallops  Abd: soft, NTND  MSK: no visible deformities  Neuro: No focal sensory or motor deficits  Skin: Warm, well perfused, no rash  Psych: normal affect.     Thaddeus Randle PGY3

## 2021-06-11 NOTE — ED PROVIDER NOTE - PATIENT PORTAL LINK FT
You can access the FollowMyHealth Patient Portal offered by [facility] by registering at the following website: [email or web address]. By joining [employer]’s FollowMyHealth portal, you will also be able to view your health information using other applications (apps) compatible with our system.

## 2021-06-11 NOTE — ED PROVIDER NOTE - PMH
Atrial fibrillation    Atrial fibrillation    Bipolar depression    Cataract    Cerebrovascular accident (CVA)    Fibromyalgia    H/O aortic aneurysm  and thoracic aortic aneurysm  Hip dislocation, left  avascular necrosis  HLD (hyperlipidemia)    HTN (hypertension)    Hypothyroid    Spinal stenosis    Spinal stenosis of lumbar region    Stroke    Subacute systemic lupus erythematosus    Uterine cancer    Uterine cancer

## 2021-06-12 VITALS
TEMPERATURE: 98 F | DIASTOLIC BLOOD PRESSURE: 82 MMHG | OXYGEN SATURATION: 100 % | SYSTOLIC BLOOD PRESSURE: 163 MMHG | RESPIRATION RATE: 18 BRPM | HEART RATE: 84 BPM

## 2021-06-12 PROBLEM — I63.9 CEREBRAL INFARCTION, UNSPECIFIED: Chronic | Status: ACTIVE | Noted: 2020-10-27

## 2021-06-12 PROBLEM — M48.00 SPINAL STENOSIS, SITE UNSPECIFIED: Chronic | Status: ACTIVE | Noted: 2020-10-27

## 2021-06-12 PROBLEM — C55 MALIGNANT NEOPLASM OF UTERUS, PART UNSPECIFIED: Chronic | Status: ACTIVE | Noted: 2020-10-27

## 2021-06-12 PROBLEM — I48.91 UNSPECIFIED ATRIAL FIBRILLATION: Chronic | Status: ACTIVE | Noted: 2020-10-27

## 2021-06-12 LAB
ALBUMIN SERPL ELPH-MCNC: 4.5 G/DL — SIGNIFICANT CHANGE UP (ref 3.3–5)
ALP SERPL-CCNC: 85 U/L — SIGNIFICANT CHANGE UP (ref 40–120)
ALT FLD-CCNC: 13 U/L — SIGNIFICANT CHANGE UP (ref 4–33)
ANION GAP SERPL CALC-SCNC: 16 MMOL/L — HIGH (ref 7–14)
APTT BLD: 42.3 SEC — HIGH (ref 27–36.3)
AST SERPL-CCNC: 21 U/L — SIGNIFICANT CHANGE UP (ref 4–32)
BASOPHILS # BLD AUTO: 0.02 K/UL — SIGNIFICANT CHANGE UP (ref 0–0.2)
BASOPHILS NFR BLD AUTO: 0.3 % — SIGNIFICANT CHANGE UP (ref 0–2)
BILIRUB SERPL-MCNC: 0.6 MG/DL — SIGNIFICANT CHANGE UP (ref 0.2–1.2)
BUN SERPL-MCNC: 15 MG/DL — SIGNIFICANT CHANGE UP (ref 7–23)
CALCIUM SERPL-MCNC: 10.7 MG/DL — HIGH (ref 8.4–10.5)
CHLORIDE SERPL-SCNC: 104 MMOL/L — SIGNIFICANT CHANGE UP (ref 98–107)
CO2 SERPL-SCNC: 21 MMOL/L — LOW (ref 22–31)
CREAT SERPL-MCNC: 0.59 MG/DL — SIGNIFICANT CHANGE UP (ref 0.5–1.3)
EOSINOPHIL # BLD AUTO: 0.08 K/UL — SIGNIFICANT CHANGE UP (ref 0–0.5)
EOSINOPHIL NFR BLD AUTO: 1.1 % — SIGNIFICANT CHANGE UP (ref 0–6)
GLUCOSE SERPL-MCNC: 122 MG/DL — HIGH (ref 70–99)
HCT VFR BLD CALC: 40.6 % — SIGNIFICANT CHANGE UP (ref 34.5–45)
HGB BLD-MCNC: 13.5 G/DL — SIGNIFICANT CHANGE UP (ref 11.5–15.5)
IANC: 5.24 K/UL — SIGNIFICANT CHANGE UP (ref 1.5–8.5)
IMM GRANULOCYTES NFR BLD AUTO: 0.5 % — SIGNIFICANT CHANGE UP (ref 0–1.5)
INR BLD: 1.53 RATIO — HIGH (ref 0.88–1.16)
LYMPHOCYTES # BLD AUTO: 1.47 K/UL — SIGNIFICANT CHANGE UP (ref 1–3.3)
LYMPHOCYTES # BLD AUTO: 19.6 % — SIGNIFICANT CHANGE UP (ref 13–44)
MCHC RBC-ENTMCNC: 32.8 PG — SIGNIFICANT CHANGE UP (ref 27–34)
MCHC RBC-ENTMCNC: 33.3 GM/DL — SIGNIFICANT CHANGE UP (ref 32–36)
MCV RBC AUTO: 98.8 FL — SIGNIFICANT CHANGE UP (ref 80–100)
MONOCYTES # BLD AUTO: 0.66 K/UL — SIGNIFICANT CHANGE UP (ref 0–0.9)
MONOCYTES NFR BLD AUTO: 8.8 % — SIGNIFICANT CHANGE UP (ref 2–14)
NEUTROPHILS # BLD AUTO: 5.24 K/UL — SIGNIFICANT CHANGE UP (ref 1.8–7.4)
NEUTROPHILS NFR BLD AUTO: 69.7 % — SIGNIFICANT CHANGE UP (ref 43–77)
NRBC # BLD: 0 /100 WBCS — SIGNIFICANT CHANGE UP
NRBC # FLD: 0 K/UL — SIGNIFICANT CHANGE UP
PLATELET # BLD AUTO: 334 K/UL — SIGNIFICANT CHANGE UP (ref 150–400)
POTASSIUM SERPL-MCNC: 3.7 MMOL/L — SIGNIFICANT CHANGE UP (ref 3.5–5.3)
POTASSIUM SERPL-SCNC: 3.7 MMOL/L — SIGNIFICANT CHANGE UP (ref 3.5–5.3)
PROT SERPL-MCNC: 8 G/DL — SIGNIFICANT CHANGE UP (ref 6–8.3)
PROTHROM AB SERPL-ACNC: 17.2 SEC — HIGH (ref 10.6–13.6)
RBC # BLD: 4.11 M/UL — SIGNIFICANT CHANGE UP (ref 3.8–5.2)
RBC # FLD: 12.9 % — SIGNIFICANT CHANGE UP (ref 10.3–14.5)
SODIUM SERPL-SCNC: 141 MMOL/L — SIGNIFICANT CHANGE UP (ref 135–145)
WBC # BLD: 7.51 K/UL — SIGNIFICANT CHANGE UP (ref 3.8–10.5)
WBC # FLD AUTO: 7.51 K/UL — SIGNIFICANT CHANGE UP (ref 3.8–10.5)

## 2021-06-12 RX ADMIN — Medication 2 MILLIGRAM(S): at 01:06

## 2021-06-12 RX ADMIN — ONDANSETRON 4 MILLIGRAM(S): 8 TABLET, FILM COATED ORAL at 01:06

## 2021-06-12 RX ADMIN — Medication 1 TABLET(S): at 02:55

## 2021-06-15 ENCOUNTER — APPOINTMENT (OUTPATIENT)
Dept: OTOLARYNGOLOGY | Facility: CLINIC | Age: 71
End: 2021-06-15
Payer: MEDICARE

## 2021-06-15 VITALS
HEART RATE: 77 BPM | BODY MASS INDEX: 29.55 KG/M2 | WEIGHT: 195 LBS | SYSTOLIC BLOOD PRESSURE: 152 MMHG | HEIGHT: 68 IN | DIASTOLIC BLOOD PRESSURE: 91 MMHG

## 2021-06-15 DIAGNOSIS — R04.0 EPISTAXIS: ICD-10-CM

## 2021-06-15 PROCEDURE — 99204 OFFICE O/P NEW MOD 45 MIN: CPT | Mod: 25

## 2021-06-15 PROCEDURE — 99072 ADDL SUPL MATRL&STAF TM PHE: CPT

## 2021-06-15 PROCEDURE — 31238 NSL/SINS NDSC SRG NSL HEMRRG: CPT

## 2021-06-15 RX ORDER — BUTALBITAL, ACETAMINOPHEN, AND CAFFEINE 50; 300; 40 MG/1; MG/1; MG/1
50-300-40 CAPSULE ORAL
Refills: 0 | Status: DISCONTINUED | COMMUNITY
End: 2021-06-15

## 2021-06-15 RX ORDER — HYOSCYAMINE SULFATE 0.12 MG/1
0.12 TABLET, ORALLY DISINTEGRATING ORAL
Refills: 0 | Status: DISCONTINUED | COMMUNITY
End: 2021-06-15

## 2021-06-15 RX ORDER — APIXABAN 5 MG/1
TABLET, FILM COATED ORAL
Refills: 0 | Status: ACTIVE | COMMUNITY

## 2021-06-15 RX ORDER — OXYCODONE HYDROCHLORIDE 30 MG/1
TABLET ORAL
Refills: 0 | Status: ACTIVE | COMMUNITY

## 2021-06-15 RX ORDER — CARVEDILOL 25 MG/1
TABLET, FILM COATED ORAL
Refills: 0 | Status: ACTIVE | COMMUNITY

## 2021-06-15 RX ORDER — TRAZODONE HYDROCHLORIDE 100 MG/1
100 TABLET ORAL
Refills: 0 | Status: DISCONTINUED | COMMUNITY
End: 2021-06-15

## 2021-06-15 RX ORDER — LEVETIRACETAM 1000 MG/1
TABLET, FILM COATED ORAL
Refills: 0 | Status: ACTIVE | COMMUNITY

## 2021-06-15 RX ORDER — METOPROLOL TARTRATE 100 MG/1
100 TABLET, FILM COATED ORAL
Refills: 0 | Status: DISCONTINUED | COMMUNITY
End: 2021-06-15

## 2021-06-15 NOTE — HISTORY OF PRESENT ILLNESS
[de-identified] : 70 year old female referred by ER for packing removal for epistaxis.  States 6/11/21 9:30pm  had right sided nosebleed, went by ambulance to ER.  Packed 6/12/21 at 2:00 am and sent home.  States still on Eliquis, sometimes takes aspirin 81mg. \par Pt with h/o open heart surgery for aneurysm Feb. 2020, has been on Eliquis.  States 9 days later had a stroke, put on Keppra.  Had multiple falls, never on face.

## 2021-06-15 NOTE — PHYSICAL EXAM
Nausea and vomiting, intractability of vomiting not specified, unspecified vomiting type [de-identified] : R rhino rocket in place.  Removed.  Topical Afrin and 4% Lidocaine applied bilaterally on cotton balls.  No bleeding seen. [Midline] : trachea located in midline position [Normal] : no rashes

## 2021-06-15 NOTE — REASON FOR VISIT
[Initial Evaluation] : an initial evaluation for [FreeTextEntry2] : referred by ER for packing removal for epistaxis

## 2021-06-15 NOTE — PROCEDURE
[Epistaxis] : evaluation of epistaxis [Anterior rhinoscopy insufficient to account for symptoms] : anterior rhinoscopy insufficient to account for symptoms [Topical Lidocaine] : topical lidocaine [Oxymetazoline HCl] : oxymetazoline HCl [Rigid Endoscope] : examined with a rigid endoscope [Serial Number: ___] : Serial Number: [unfilled] [Deviated to the Rt] : deviated to the right [Nasal Septum Mucosa Bleeding Right] : bleeding on the right [Normal] : the paranasal sinuses had no abnormalities [FreeTextEntry2] : R septal bleeding stoped with topical Surgicel.

## 2021-06-15 NOTE — CONSULT LETTER
[Dear  ___] : Dear  [unfilled], [Please see my note below.] : Please see my note below. [Sincerely,] : Sincerely, [Consult Letter:] : I had the pleasure of evaluating your patient, [unfilled]. [Consult Closing:] : Thank you very much for allowing me to participate in the care of this patient.  If you have any questions, please do not hesitate to contact me. [FreeTextEntry2] : Chetan Montana MD [FreeTextEntry3] : Bernardo Crawford MD, FACS\par Chief of Otolaryngology at [facility]\par \par Dept. of Otolaryngology\par [facility]\par  [DrLuke  ___] : Dr. FAIRBANKS

## 2021-06-15 NOTE — ASSESSMENT
[FreeTextEntry1] : Pt to hold tonight's dose of Eliquis and will resume in am tomorrow.  Pt to start saline rinses in 48 hours.

## 2021-06-17 NOTE — ED PROCEDURE NOTE - ATTENDING CONTRIBUTION TO CARE
Dr. Guajardo: I personally supervised this procedure performed by the resident and I agree with the above documentation.

## 2021-06-17 NOTE — ED PROCEDURE NOTE - CPROC ED NASAL DETAIL1
The anatomic location was packed./The bleeding stopped.

## 2021-06-30 NOTE — ED PROVIDER NOTE - PSH
30-Jun-2021
H/O bilateral salpingo-oophorectomy    H/O thoracic aortic aneurysm repair    History of total abdominal hysterectomy

## 2021-07-26 ENCOUNTER — EMERGENCY (EMERGENCY)
Facility: HOSPITAL | Age: 71
LOS: 1 days | Discharge: HOME | End: 2021-07-26
Attending: EMERGENCY MEDICINE | Admitting: EMERGENCY MEDICINE
Payer: MEDICARE

## 2021-07-26 VITALS
HEART RATE: 85 BPM | TEMPERATURE: 98 F | DIASTOLIC BLOOD PRESSURE: 76 MMHG | OXYGEN SATURATION: 98 % | SYSTOLIC BLOOD PRESSURE: 126 MMHG | RESPIRATION RATE: 16 BRPM

## 2021-07-26 VITALS
HEART RATE: 76 BPM | SYSTOLIC BLOOD PRESSURE: 171 MMHG | TEMPERATURE: 99 F | DIASTOLIC BLOOD PRESSURE: 71 MMHG | HEIGHT: 66 IN | OXYGEN SATURATION: 99 % | RESPIRATION RATE: 20 BRPM

## 2021-07-26 DIAGNOSIS — Z98.51 TUBAL LIGATION STATUS: Chronic | ICD-10-CM

## 2021-07-26 DIAGNOSIS — Z90.710 ACQUIRED ABSENCE OF BOTH CERVIX AND UTERUS: Chronic | ICD-10-CM

## 2021-07-26 DIAGNOSIS — Z98.890 OTHER SPECIFIED POSTPROCEDURAL STATES: Chronic | ICD-10-CM

## 2021-07-26 DIAGNOSIS — Z90.79 ACQUIRED ABSENCE OF OTHER GENITAL ORGAN(S): Chronic | ICD-10-CM

## 2021-07-26 DIAGNOSIS — Z98.49 CATARACT EXTRACTION STATUS, UNSPECIFIED EYE: Chronic | ICD-10-CM

## 2021-07-26 DIAGNOSIS — Z98.891 HISTORY OF UTERINE SCAR FROM PREVIOUS SURGERY: Chronic | ICD-10-CM

## 2021-07-26 LAB
ALBUMIN SERPL ELPH-MCNC: 4.6 G/DL — SIGNIFICANT CHANGE UP (ref 3.3–5)
ALP SERPL-CCNC: 89 U/L — SIGNIFICANT CHANGE UP (ref 40–120)
ALT FLD-CCNC: 10 U/L — SIGNIFICANT CHANGE UP (ref 4–33)
ANION GAP SERPL CALC-SCNC: 18 MMOL/L — HIGH (ref 7–14)
AST SERPL-CCNC: 15 U/L — SIGNIFICANT CHANGE UP (ref 4–32)
BASOPHILS # BLD AUTO: 0.02 K/UL — SIGNIFICANT CHANGE UP (ref 0–0.2)
BASOPHILS NFR BLD AUTO: 0.3 % — SIGNIFICANT CHANGE UP (ref 0–2)
BILIRUB SERPL-MCNC: 0.6 MG/DL — SIGNIFICANT CHANGE UP (ref 0.2–1.2)
BLOOD GAS VENOUS COMPREHENSIVE RESULT: SIGNIFICANT CHANGE UP
BUN SERPL-MCNC: 12 MG/DL — SIGNIFICANT CHANGE UP (ref 7–23)
CALCIUM SERPL-MCNC: 10 MG/DL — SIGNIFICANT CHANGE UP (ref 8.4–10.5)
CHLORIDE SERPL-SCNC: 102 MMOL/L — SIGNIFICANT CHANGE UP (ref 98–107)
CO2 SERPL-SCNC: 21 MMOL/L — LOW (ref 22–31)
CREAT SERPL-MCNC: 0.65 MG/DL — SIGNIFICANT CHANGE UP (ref 0.5–1.3)
EOSINOPHIL # BLD AUTO: 0.04 K/UL — SIGNIFICANT CHANGE UP (ref 0–0.5)
EOSINOPHIL NFR BLD AUTO: 0.5 % — SIGNIFICANT CHANGE UP (ref 0–6)
GLUCOSE SERPL-MCNC: 121 MG/DL — HIGH (ref 70–99)
HCT VFR BLD CALC: 38.5 % — SIGNIFICANT CHANGE UP (ref 34.5–45)
HGB BLD-MCNC: 12.5 G/DL — SIGNIFICANT CHANGE UP (ref 11.5–15.5)
IANC: 5.36 K/UL — SIGNIFICANT CHANGE UP (ref 1.5–8.5)
IMM GRANULOCYTES NFR BLD AUTO: 0.3 % — SIGNIFICANT CHANGE UP (ref 0–1.5)
INR BLD: 2.05 RATIO — HIGH (ref 0.88–1.16)
LYMPHOCYTES # BLD AUTO: 1.52 K/UL — SIGNIFICANT CHANGE UP (ref 1–3.3)
LYMPHOCYTES # BLD AUTO: 19.6 % — SIGNIFICANT CHANGE UP (ref 13–44)
MCHC RBC-ENTMCNC: 31 PG — SIGNIFICANT CHANGE UP (ref 27–34)
MCHC RBC-ENTMCNC: 32.5 GM/DL — SIGNIFICANT CHANGE UP (ref 32–36)
MCV RBC AUTO: 95.5 FL — SIGNIFICANT CHANGE UP (ref 80–100)
MONOCYTES # BLD AUTO: 0.81 K/UL — SIGNIFICANT CHANGE UP (ref 0–0.9)
MONOCYTES NFR BLD AUTO: 10.4 % — SIGNIFICANT CHANGE UP (ref 2–14)
NEUTROPHILS # BLD AUTO: 5.36 K/UL — SIGNIFICANT CHANGE UP (ref 1.8–7.4)
NEUTROPHILS NFR BLD AUTO: 68.9 % — SIGNIFICANT CHANGE UP (ref 43–77)
NRBC # BLD: 0 /100 WBCS — SIGNIFICANT CHANGE UP
NRBC # FLD: 0 K/UL — SIGNIFICANT CHANGE UP
PLATELET # BLD AUTO: 333 K/UL — SIGNIFICANT CHANGE UP (ref 150–400)
POTASSIUM SERPL-MCNC: 3.2 MMOL/L — LOW (ref 3.5–5.3)
POTASSIUM SERPL-SCNC: 3.2 MMOL/L — LOW (ref 3.5–5.3)
PROT SERPL-MCNC: 7.7 G/DL — SIGNIFICANT CHANGE UP (ref 6–8.3)
PROTHROM AB SERPL-ACNC: 22.8 SEC — HIGH (ref 10.6–13.6)
RBC # BLD: 4.03 M/UL — SIGNIFICANT CHANGE UP (ref 3.8–5.2)
RBC # FLD: 12.8 % — SIGNIFICANT CHANGE UP (ref 10.3–14.5)
SARS-COV-2 RNA SPEC QL NAA+PROBE: SIGNIFICANT CHANGE UP
SODIUM SERPL-SCNC: 141 MMOL/L — SIGNIFICANT CHANGE UP (ref 135–145)
TROPONIN T, HIGH SENSITIVITY RESULT: 9 NG/L — SIGNIFICANT CHANGE UP
WBC # BLD: 7.77 K/UL — SIGNIFICANT CHANGE UP (ref 3.8–10.5)
WBC # FLD AUTO: 7.77 K/UL — SIGNIFICANT CHANGE UP (ref 3.8–10.5)

## 2021-07-26 PROCEDURE — 74174 CTA ABD&PLVS W/CONTRAST: CPT | Mod: 26

## 2021-07-26 PROCEDURE — 99285 EMERGENCY DEPT VISIT HI MDM: CPT

## 2021-07-26 PROCEDURE — 71275 CT ANGIOGRAPHY CHEST: CPT | Mod: 26

## 2021-07-26 PROCEDURE — 73502 X-RAY EXAM HIP UNI 2-3 VIEWS: CPT | Mod: 26,LT

## 2021-07-26 PROCEDURE — 71046 X-RAY EXAM CHEST 2 VIEWS: CPT | Mod: 26

## 2021-07-26 RX ORDER — SODIUM CHLORIDE 9 MG/ML
1000 INJECTION INTRAMUSCULAR; INTRAVENOUS; SUBCUTANEOUS ONCE
Refills: 0 | Status: COMPLETED | OUTPATIENT
Start: 2021-07-26 | End: 2021-07-26

## 2021-07-26 RX ORDER — ACETAMINOPHEN 500 MG
650 TABLET ORAL ONCE
Refills: 0 | Status: COMPLETED | OUTPATIENT
Start: 2021-07-26 | End: 2021-07-26

## 2021-07-26 RX ADMIN — SODIUM CHLORIDE 2000 MILLILITER(S): 9 INJECTION INTRAMUSCULAR; INTRAVENOUS; SUBCUTANEOUS at 13:19

## 2021-07-26 RX ADMIN — Medication 2 MILLIGRAM(S): at 13:20

## 2021-07-26 RX ADMIN — Medication 650 MILLIGRAM(S): at 18:43

## 2021-07-26 NOTE — ED PROVIDER NOTE - CLINICAL SUMMARY MEDICAL DECISION MAKING FREE TEXT BOX
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

## 2021-07-26 NOTE — ED PROVIDER NOTE - PATIENT PORTAL LINK FT
You can access the FollowMyHealth Patient Portal offered by [facility] by registering at the following website: [email or web address]. By joining [employer]’s FollowMyHealth portal, you will also be able to view your health information using other applications (apps) compatible with our system.

## 2021-07-26 NOTE — ED PROVIDER NOTE - ATTENDING CONTRIBUTION TO CARE
Patient is a 69 yo F with history of HTN, hyperlipidemia, hypothyroidism, afib on eliquis, RA, SLE, history of right hip replacements in AVN of left hip, history of aortic arch aneurysm repair, CVA with left sided weakness, depression and anxiety here for chest pressure and high blood pressure. She reports her BP has been uncontrolled despite taking her medications. She has been grieving the death of her daughter due to COVID since April. She reports she noted chest pain with left sided arm tightness and became concerned so came in for evaluation. No radiation to her back, abdomen. No dizziness. No nausea, vomiting or diaphoresis. Denies exertional chest pain.     VS noted  Gen: tearful, anxious  HEENT: EOMI, mmm  Lungs: CTAB/L no C/ W /R   CVS: RRR   Abd; Soft non tender, non distended   Pelvis: stable, left leg short and externally rotated  Ext: no edema  Skin: no rash  Neuro AAOx3 non focal clear speech  a/p: chest pain with radiation to left arm - plan for ACS work up, r/o dissection. Pt admits to depression/ anxiety but denies SI. She states she is grieving. It is possible that her anxiety and grief is driving up her blood pressure. She does not want to speak to a psychiatrist at this time.  - Sohan BROOKS

## 2021-07-26 NOTE — ED PROVIDER NOTE - OBJECTIVE STATEMENT
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 Pt tearful, upset, states her daughter was the primary caretaker in her life,  from covid recently, states she hasn't been to a doctor and is missing her appointments. Pt usually takes Ativan 3x per day for anxiety.

## 2021-07-26 NOTE — ED PROVIDER NOTE - IV ALTEPLASE ADMIN OUTSIDE HIDDEN
Health Maintenance Due   Topic Date Due   â¢ Hepatitis B Vaccine (1 of 3 - Risk 3-dose series) 09/14/1967   â¢ Shingles Vaccine (1 of 2) 09/14/2000   â¢ Diabetes Eye Exam  06/27/2020   â¢ Diabetes A1C  07/13/2020   â¢ DM/CKD Microalbumin  07/16/2020   â¢ Influenza Vaccine (1) 09/01/2020   â¢ Medicare Wellness Visit  10/14/2020       Patient is due for topics listed above, she wishes to proceed with Diabetes A1C and Diabetes Foot Exam, but is not proceeding with Immunization(s) Hep B and Influenza and MWV (Medicare Wellness Visit) at this time. The following has occured: Orders placed for Diabetes A1C. show

## 2021-07-26 NOTE — ED ADULT TRIAGE NOTE - CHIEF COMPLAINT QUOTE
pt coming from home for CP, Hi-BP of 194/110 Hr. 106. very anxious, denies dizziness, cough, fever, as per EMS pt have been under stress due to her daughter die recently,   while pt trying to move to the stretcher pt c/o left hip pain appears short.

## 2021-07-26 NOTE — ED PROVIDER NOTE - NSFOLLOWUPINSTRUCTIONS_ED_ALL_ED_FT
Advance activity as tolerated.  Continue all previously prescribed medications as directed unless otherwise instructed.  Follow up with your primary care physician in 48-72 hours- bring copies of your results.  Return to the ER for worsening or persistent symptoms, and/or ANY NEW OR CONCERNING SYMPTOMS. If you have issues obtaining follow up, please call: [phone] to obtain a doctor or specialist who takes your insurance in your area.  You may call [phone] to make an appointment with the internal medicine clinic.

## 2021-07-26 NOTE — ED PROVIDER NOTE - NSFOLLOWUPCLINICS_GEN_ALL_ED_FT
[facility]  Behavioral Health  [address] [place] [address]  Phone: [phone]  Fax:

## 2021-07-26 NOTE — ED PROVIDER NOTE - PROGRESS NOTE DETAILS
Labs/imaging non-actionable, pt feels better after receiving Ativan, will d/c with Crisis center f/u.

## 2021-07-26 NOTE — ED ADULT NURSE NOTE - OBJECTIVE STATEMENT
Pt A/Ox4 states she has been feeling sad and anxious since her daughter passed in April. Pt tearful at time of assessment. Denies pain at present. Pt states her anxiety has been unmanageable recently and states she cant stop thinking about her daughter, states she feels like she has no one to talk to, recommended to see a therapist states she may look into that. Pt denies medical complaints at present. Appears physically comfortable. Noted 20G to RT AC placed per EMS, flushes well, dressing clean dry and intact. Blood drawn, labeled at bedside with 2 pt identifiers and sent to lab. Pt aware of POC, NAD. Will continue to monitor.

## 2021-07-26 NOTE — ED PROVIDER NOTE - WR ORDER DATE AND TIME 1
26-Jul-2021 13:09
Detail Level: Zone
Patient Specific Counseling (Will Not Stick From Patient To Patient): -\\nPatient to wash hair more frequently

## 2021-12-15 NOTE — ED ADULT NURSE NOTE - NSSEPSISSUSPECTED_ED_A_ED
TRIAGE CALL:    Patient has the following symptoms:  Right pinky finger nail came off at work    The symptoms have been present for: 1 day (occurred yesterday)    People at work told him to go to urgent care but patient refused. Prisca wanting to discuss with nurse if there is reason for concern or what to watch for if patient refused urgent care again today.     Emergent, warm transferred to Triage Nurse: No    Preferred Pharmacy:    [facility] [place]    Contact: Prisca Valdovinos     Cell Phone: [phone]       Patient notified if PCP out of office: NA      If the patient is calling after 4:45pm, contact the Triage Nurse line directly at ext [phone].  ------------  COVID-19 Screening:    • Does the patient OR patient’s household members have any of the following symptoms?  o Temperature: Fever ?100.0°F or ?37.8°C?  No  o Respiratory symptoms: New or worsening cough, shortness of breath, difficulty breathing, or sore throat? No  o GI symptoms: New onset of nausea, vomiting or diarrhea?  No  o Miscellaneous: New onset of loss of taste or smell, chills, repeated shaking with chills, muscle pain, headache, congestion or runny nose?  No  • Has the patient or a household member tested positive for COVID-19 in the last 14 days?  No  • Has the patient or a household member been tested for COVID-19 and are waiting for the results?  No     No

## 2022-03-23 ENCOUNTER — EMERGENCY (EMERGENCY)
Facility: HOSPITAL | Age: 72
LOS: 1 days | Discharge: HOME | End: 2022-03-23
Attending: EMERGENCY MEDICINE
Payer: MEDICARE

## 2022-03-23 VITALS
RESPIRATION RATE: 18 BRPM | HEART RATE: 64 BPM | DIASTOLIC BLOOD PRESSURE: 99 MMHG | SYSTOLIC BLOOD PRESSURE: 170 MMHG | HEIGHT: 66 IN | OXYGEN SATURATION: 98 % | WEIGHT: 179.9 LBS | TEMPERATURE: 98 F

## 2022-03-23 VITALS
DIASTOLIC BLOOD PRESSURE: 88 MMHG | SYSTOLIC BLOOD PRESSURE: 148 MMHG | HEART RATE: 69 BPM | RESPIRATION RATE: 18 BRPM | TEMPERATURE: 98 F | OXYGEN SATURATION: 95 %

## 2022-03-23 DIAGNOSIS — Z98.51 TUBAL LIGATION STATUS: Chronic | ICD-10-CM

## 2022-03-23 DIAGNOSIS — Z98.49 CATARACT EXTRACTION STATUS, UNSPECIFIED EYE: Chronic | ICD-10-CM

## 2022-03-23 DIAGNOSIS — Z90.79 ACQUIRED ABSENCE OF OTHER GENITAL ORGAN(S): Chronic | ICD-10-CM

## 2022-03-23 DIAGNOSIS — Z90.710 ACQUIRED ABSENCE OF BOTH CERVIX AND UTERUS: Chronic | ICD-10-CM

## 2022-03-23 DIAGNOSIS — Z98.891 HISTORY OF UTERINE SCAR FROM PREVIOUS SURGERY: Chronic | ICD-10-CM

## 2022-03-23 DIAGNOSIS — Z98.890 OTHER SPECIFIED POSTPROCEDURAL STATES: Chronic | ICD-10-CM

## 2022-03-23 LAB
ALBUMIN SERPL ELPH-MCNC: 4.8 G/DL — SIGNIFICANT CHANGE UP (ref 3.3–5)
ALP SERPL-CCNC: 66 U/L — SIGNIFICANT CHANGE UP (ref 40–120)
ALT FLD-CCNC: 31 U/L — SIGNIFICANT CHANGE UP (ref 10–45)
ANION GAP SERPL CALC-SCNC: 11 MMOL/L — SIGNIFICANT CHANGE UP (ref 5–17)
ANION GAP SERPL CALC-SCNC: 15 MMOL/L — SIGNIFICANT CHANGE UP (ref 5–17)
AST SERPL-CCNC: 116 U/L — HIGH (ref 10–40)
BASE EXCESS BLDV CALC-SCNC: 4.3 MMOL/L — HIGH (ref -2–2)
BASOPHILS # BLD AUTO: 0.03 K/UL — SIGNIFICANT CHANGE UP (ref 0–0.2)
BASOPHILS NFR BLD AUTO: 0.3 % — SIGNIFICANT CHANGE UP (ref 0–2)
BILIRUB SERPL-MCNC: 0.5 MG/DL — SIGNIFICANT CHANGE UP (ref 0.2–1.2)
BUN SERPL-MCNC: 8 MG/DL — SIGNIFICANT CHANGE UP (ref 7–23)
BUN SERPL-MCNC: 9 MG/DL — SIGNIFICANT CHANGE UP (ref 7–23)
CA-I SERPL-SCNC: 1.25 MMOL/L — SIGNIFICANT CHANGE UP (ref 1.15–1.33)
CALCIUM SERPL-MCNC: 10.1 MG/DL — SIGNIFICANT CHANGE UP (ref 8.4–10.5)
CALCIUM SERPL-MCNC: 9.8 MG/DL — SIGNIFICANT CHANGE UP (ref 8.4–10.5)
CHLORIDE BLDV-SCNC: 101 MMOL/L — SIGNIFICANT CHANGE UP (ref 96–108)
CHLORIDE SERPL-SCNC: 101 MMOL/L — SIGNIFICANT CHANGE UP (ref 96–108)
CHLORIDE SERPL-SCNC: 101 MMOL/L — SIGNIFICANT CHANGE UP (ref 96–108)
CO2 BLDV-SCNC: 31 MMOL/L — HIGH (ref 22–26)
CO2 SERPL-SCNC: 23 MMOL/L — SIGNIFICANT CHANGE UP (ref 22–31)
CO2 SERPL-SCNC: 23 MMOL/L — SIGNIFICANT CHANGE UP (ref 22–31)
CREAT SERPL-MCNC: 0.52 MG/DL — SIGNIFICANT CHANGE UP (ref 0.5–1.3)
CREAT SERPL-MCNC: 0.56 MG/DL — SIGNIFICANT CHANGE UP (ref 0.5–1.3)
EGFR: 101 ML/MIN/1.73M2 — SIGNIFICANT CHANGE UP
EGFR: 99 ML/MIN/1.73M2 — SIGNIFICANT CHANGE UP
EOSINOPHIL # BLD AUTO: 0.04 K/UL — SIGNIFICANT CHANGE UP (ref 0–0.5)
EOSINOPHIL NFR BLD AUTO: 0.5 % — SIGNIFICANT CHANGE UP (ref 0–6)
GAS PNL BLDV: 138 MMOL/L — SIGNIFICANT CHANGE UP (ref 136–145)
GAS PNL BLDV: SIGNIFICANT CHANGE UP
GAS PNL BLDV: SIGNIFICANT CHANGE UP
GLUCOSE BLDV-MCNC: 105 MG/DL — HIGH (ref 70–99)
GLUCOSE SERPL-MCNC: 103 MG/DL — HIGH (ref 70–99)
GLUCOSE SERPL-MCNC: 86 MG/DL — SIGNIFICANT CHANGE UP (ref 70–99)
HCO3 BLDV-SCNC: 30 MMOL/L — HIGH (ref 22–29)
HCT VFR BLD CALC: 39.5 % — SIGNIFICANT CHANGE UP (ref 34.5–45)
HCT VFR BLDA CALC: 41 % — SIGNIFICANT CHANGE UP (ref 34.5–46.5)
HGB BLD CALC-MCNC: 13.8 G/DL — SIGNIFICANT CHANGE UP (ref 11.7–16.1)
HGB BLD-MCNC: 12.7 G/DL — SIGNIFICANT CHANGE UP (ref 11.5–15.5)
IMM GRANULOCYTES NFR BLD AUTO: 0.3 % — SIGNIFICANT CHANGE UP (ref 0–1.5)
LACTATE BLDV-MCNC: 0.9 MMOL/L — SIGNIFICANT CHANGE UP (ref 0.7–2)
LYMPHOCYTES # BLD AUTO: 1.23 K/UL — SIGNIFICANT CHANGE UP (ref 1–3.3)
LYMPHOCYTES # BLD AUTO: 14.2 % — SIGNIFICANT CHANGE UP (ref 13–44)
MCHC RBC-ENTMCNC: 31.9 PG — SIGNIFICANT CHANGE UP (ref 27–34)
MCHC RBC-ENTMCNC: 32.2 GM/DL — SIGNIFICANT CHANGE UP (ref 32–36)
MCV RBC AUTO: 99.2 FL — SIGNIFICANT CHANGE UP (ref 80–100)
MONOCYTES # BLD AUTO: 0.79 K/UL — SIGNIFICANT CHANGE UP (ref 0–0.9)
MONOCYTES NFR BLD AUTO: 9.1 % — SIGNIFICANT CHANGE UP (ref 2–14)
NEUTROPHILS # BLD AUTO: 6.52 K/UL — SIGNIFICANT CHANGE UP (ref 1.8–7.4)
NEUTROPHILS NFR BLD AUTO: 75.6 % — SIGNIFICANT CHANGE UP (ref 43–77)
NRBC # BLD: 0 /100 WBCS — SIGNIFICANT CHANGE UP (ref 0–0)
PCO2 BLDV: 47 MMHG — HIGH (ref 39–42)
PH BLDV: 7.41 — SIGNIFICANT CHANGE UP (ref 7.32–7.43)
PLATELET # BLD AUTO: 289 K/UL — SIGNIFICANT CHANGE UP (ref 150–400)
PO2 BLDV: 25 MMHG — SIGNIFICANT CHANGE UP (ref 25–45)
POTASSIUM BLDV-SCNC: 3.5 MMOL/L — SIGNIFICANT CHANGE UP (ref 3.5–5.1)
POTASSIUM SERPL-MCNC: 5.1 MMOL/L — SIGNIFICANT CHANGE UP (ref 3.5–5.3)
POTASSIUM SERPL-MCNC: 8.8 MMOL/L — CRITICAL HIGH (ref 3.5–5.3)
POTASSIUM SERPL-SCNC: 5.1 MMOL/L — SIGNIFICANT CHANGE UP (ref 3.5–5.3)
POTASSIUM SERPL-SCNC: 8.8 MMOL/L — CRITICAL HIGH (ref 3.5–5.3)
PROT SERPL-MCNC: 8.6 G/DL — HIGH (ref 6–8.3)
RBC # BLD: 3.98 M/UL — SIGNIFICANT CHANGE UP (ref 3.8–5.2)
RBC # FLD: 14.2 % — SIGNIFICANT CHANGE UP (ref 10.3–14.5)
SAO2 % BLDV: 38.8 % — LOW (ref 67–88)
SODIUM SERPL-SCNC: 135 MMOL/L — SIGNIFICANT CHANGE UP (ref 135–145)
SODIUM SERPL-SCNC: 139 MMOL/L — SIGNIFICANT CHANGE UP (ref 135–145)
WBC # BLD: 8.64 K/UL — SIGNIFICANT CHANGE UP (ref 3.8–10.5)
WBC # FLD AUTO: 8.64 K/UL — SIGNIFICANT CHANGE UP (ref 3.8–10.5)

## 2022-03-23 PROCEDURE — 70486 CT MAXILLOFACIAL W/O DYE: CPT | Mod: MA

## 2022-03-23 PROCEDURE — 71045 X-RAY EXAM CHEST 1 VIEW: CPT | Mod: 26

## 2022-03-23 PROCEDURE — 72170 X-RAY EXAM OF PELVIS: CPT

## 2022-03-23 PROCEDURE — 71045 X-RAY EXAM CHEST 1 VIEW: CPT

## 2022-03-23 PROCEDURE — 85025 COMPLETE CBC W/AUTO DIFF WBC: CPT

## 2022-03-23 PROCEDURE — 70450 CT HEAD/BRAIN W/O DYE: CPT | Mod: 26,MA

## 2022-03-23 PROCEDURE — 72125 CT NECK SPINE W/O DYE: CPT | Mod: 26,MA

## 2022-03-23 PROCEDURE — 85014 HEMATOCRIT: CPT

## 2022-03-23 PROCEDURE — 80053 COMPREHEN METABOLIC PANEL: CPT

## 2022-03-23 PROCEDURE — 80048 BASIC METABOLIC PNL TOTAL CA: CPT

## 2022-03-23 PROCEDURE — 82947 ASSAY GLUCOSE BLOOD QUANT: CPT

## 2022-03-23 PROCEDURE — U0003: CPT

## 2022-03-23 PROCEDURE — 82330 ASSAY OF CALCIUM: CPT

## 2022-03-23 PROCEDURE — 72170 X-RAY EXAM OF PELVIS: CPT | Mod: 26

## 2022-03-23 PROCEDURE — 99285 EMERGENCY DEPT VISIT HI MDM: CPT | Mod: 25

## 2022-03-23 PROCEDURE — 97161 PT EVAL LOW COMPLEX 20 MIN: CPT

## 2022-03-23 PROCEDURE — 72125 CT NECK SPINE W/O DYE: CPT | Mod: MA

## 2022-03-23 PROCEDURE — 36415 COLL VENOUS BLD VENIPUNCTURE: CPT

## 2022-03-23 PROCEDURE — U0005: CPT

## 2022-03-23 PROCEDURE — 70486 CT MAXILLOFACIAL W/O DYE: CPT | Mod: 26,MA

## 2022-03-23 PROCEDURE — 76377 3D RENDER W/INTRP POSTPROCES: CPT

## 2022-03-23 PROCEDURE — 84132 ASSAY OF SERUM POTASSIUM: CPT

## 2022-03-23 PROCEDURE — 85018 HEMOGLOBIN: CPT

## 2022-03-23 PROCEDURE — 70450 CT HEAD/BRAIN W/O DYE: CPT | Mod: MA

## 2022-03-23 PROCEDURE — 83605 ASSAY OF LACTIC ACID: CPT

## 2022-03-23 PROCEDURE — 82435 ASSAY OF BLOOD CHLORIDE: CPT

## 2022-03-23 PROCEDURE — 84295 ASSAY OF SERUM SODIUM: CPT

## 2022-03-23 PROCEDURE — 82803 BLOOD GASES ANY COMBINATION: CPT

## 2022-03-23 PROCEDURE — 93005 ELECTROCARDIOGRAM TRACING: CPT

## 2022-03-23 PROCEDURE — 76377 3D RENDER W/INTRP POSTPROCES: CPT | Mod: 26

## 2022-03-23 PROCEDURE — 93010 ELECTROCARDIOGRAM REPORT: CPT

## 2022-03-23 RX ORDER — HYDRALAZINE HCL 50 MG
100 TABLET ORAL ONCE
Refills: 0 | Status: COMPLETED | OUTPATIENT
Start: 2022-03-23 | End: 2022-03-23

## 2022-03-23 RX ORDER — OXYCODONE HYDROCHLORIDE 5 MG/1
10 TABLET ORAL ONCE
Refills: 0 | Status: DISCONTINUED | OUTPATIENT
Start: 2022-03-23 | End: 2022-03-23

## 2022-03-23 RX ORDER — ONDANSETRON 8 MG/1
4 TABLET, FILM COATED ORAL ONCE
Refills: 0 | Status: COMPLETED | OUTPATIENT
Start: 2022-03-23 | End: 2022-03-23

## 2022-03-23 RX ADMIN — ONDANSETRON 4 MILLIGRAM(S): 8 TABLET, FILM COATED ORAL at 13:19

## 2022-03-23 RX ADMIN — Medication 100 MILLIGRAM(S): at 13:34

## 2022-03-23 RX ADMIN — OXYCODONE HYDROCHLORIDE 10 MILLIGRAM(S): 5 TABLET ORAL at 13:24

## 2022-03-23 NOTE — PHYSICAL THERAPY INITIAL EVALUATION ADULT - PERTINENT HX OF CURRENT PROBLEM, REHAB EVAL
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

## 2022-03-23 NOTE — ED PROVIDER NOTE - ATTENDING CONTRIBUTION TO CARE
71f who reports sp fall at her fiance's house, fell onto her face with bruising to forehead and left eye, no loc, pt used ice and percocet for the pain and swelling. Pt with mild left sided neck pain.   hx of htn, cva, avr

## 2022-03-23 NOTE — ED PROVIDER NOTE - CLINICAL SUMMARY MEDICAL DECISION MAKING FREE TEXT BOX
71yF with fall on eliquis, facial trauma as noted in exam, no concerns for retrobulbar hematoma, will eval hhead and spine given trauma and eliquis, HIP 71yF with fall on eliquis, facial trauma as noted in exam, no concerns for retrobulbar hematoma, will eval hhead and spine given trauma and eliquis, HIP has chronic symptoms

## 2022-03-23 NOTE — ED PROVIDER NOTE - PROGRESS NOTE DETAILS
Gonsalo Mattson MD, FACEP patient with hemolyzed K on labs, normal bun/cr, ekg without evidence of hyperkalemia, lab result consistent with pseudohyperkalemia in the setting of hemolyzed cbc. Will attempt to walk patient with walker if ambulatory will discharge home. Patient successfully walked with walker, provided with one in ED, will DC    Will discharge. Discussed the case with patient and/or family.  Instructed urgent follow up with primary care doctor for review of their ED visit (labs, radiology, etc.) Also instructed follow up for any specialty services as needed. Patient and/or family are aware to return to ED with any new or worsening symptoms. All questions were answered and the opportunity for to ask questions were given. Patient and/or family verbalized understanding of the above instructions.

## 2022-03-23 NOTE — ED PROVIDER NOTE - NSFOLLOWUPINSTRUCTIONS_ED_ALL_ED_FT
Advance activity as tolerated. Continue all previously prescribed medications as directed unless otherwise instructed.  Follow up with your primary care physician in 48-72 hours- bring copies of your results.     Please return to the ED if symptoms worsen, persist, or do not resolve, or if new symptoms develop, including, but not limited to, the following: fevers, chills, nausea, vomiting, chest pain, palpitations, "blackouts" (loss of consciousness), shortness of breath, wheezing, difficulty breathing, abdominal pain, back pain, trouble going to the bathroom, weakness, difficulty walking, headache, speech changes, visual changes, or numbness/tingling.     If you have issues obtaining follow up, please call: [phone] to obtain a doctor or specialist who takes your insurance in your area.  You may call [phone] to make an appointment with the internal medicine clinic.

## 2022-03-23 NOTE — ED ADULT NURSE NOTE - OBJECTIVE STATEMENT
72yo F ex-smoker, pmh HTN, HLD, RA, OA, SLE, A fib on eloquis, CVA with L sided weakness, lumbar spinal stenosis, uterine ca, congenital hip deformation s/p R hip replacement, presents to ED via EMS from fiance's home s/p mechanical fall at 3am last night. Pt reports tripping and falling onto floor in living room, fell onto face and primarily L side of the body while attempting to sit on the floor. Pt reports walking with walker at baseline d/t baseline leg weakness. Pt denies any LOC and was able to sit up following event, but has not walked since. Pt reports taking a prescribed oxycodone at 3am after fall, has not taken anything since. Pt called EMS d/t worsening of hematoma on L forehead, placed in c-collar and brought to ED by EMS. Pt is currently endorsing pain to the L head, face, abd neck. Pt denies any CP, SOB, new vision changes, new weakness, numbness, or tingling currently. Upon assessment, pt is a&ox4, afebrile, c-spine immobilized in c-collar, airway patent, spontaneous and unlabored breathing, hematoma and ecchymosis noted to L forehead, tenderness upon palpation to L face, PERRL, can move all extremities. See paper chart for neuro flowsheet. Pt seen and evaluated by MD. Patient undressed and placed into gown, call side rails up for safety. Pending CT and radiology.

## 2022-03-23 NOTE — ED PROVIDER NOTE - OBJECTIVE STATEMENT
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 Did not walk after the event, placed in C collar and EMS transported. Denies any numbness or tingling in the extremities. No visual changes, did not take eliquis today.

## 2022-03-23 NOTE — ED PROVIDER NOTE - PHYSICAL EXAMINATION
Trauma exam  	  GCS 15; no respiratory distress  A -- patent, phonating, no stridor, protecting airway  B -- CTA B/L, no flail segment, crepitation or tracheal deviation  C -- +2 rad pulse b/l, +2 DP b/l.  HR 64  , /99  D -- Not intoxicated, moving all extremity, no obvious focal deficits; follows commands    Secondary survey -- HEENT: unremarkable taisha/nasopharynx; no luxated teeth, no jaw malocclusion, + L forehead ecchymoisis with hematoma, no crepitation, no scalp lacerations, no vera/raccoon.  L eye with mild ecchymosis, No septal hematoma. + maxillofacial tenderness to palpation on the L without step off MMM, no jugular venous distension, supple neck, PERRLA, EOMI, VA equal bilaterally  at 20/60 (typically where's eye glasses but does not have them), no proptosis, nonicteric sclera, midline trachea; PULM: CTA B, no crackles/rubs/rales; CV: tachy, reg rhythm, S1S2, no MRG; ABD: Flat abdomen, NTND, no R/G/R, no CVAT.  MSK:  No C spine midline tenderness to palpation or stepoff.  No extremity deformities that are new, L knee with valgus deformation (chronic). L leg shorter than R - chronic   Compartments are soft throughout, +2 pulses x4;  NEURO: No obvious focal deficits; gross motor 5/5 and symmetric in b/l UE/LE, no sensory deficits in b/l UE/LE; PSYCH: AAOx3, no obvious intoxication, clear thought and normal sensorium.

## 2022-03-23 NOTE — PHYSICAL THERAPY INITIAL EVALUATION ADULT - ADDITIONAL COMMENTS
As per patient . she is minimally ambulatory , only walks from bed to the bathroom at home and uses wheelchair for outdoors and waiting to have R knee and left hip replacement and ambulates with unsteady gait due to this for a while.

## 2022-03-23 NOTE — ED PROVIDER NOTE - NS ED ROS FT
ROS:  GENERAL: No fever, no chills  EYES: no change in vision  HEENT: no trouble swallowing, no trouble speaking + L sided facial pain, L sided neck pain.  CARDIAC: no chest pain  PULMONARY: no cough, no shortness of breath  GI: no abdominal pain, no nausea, no vomiting, no diarrhea, no constipation  : No dysuria, no frequency, no change in appearance, or odor of urine  SKIN: no rashes, + ecchymosis L forehead and face  NEURO: no headache, no weakness  MSK: No joint pain.

## 2022-03-23 NOTE — ED ADULT NURSE NOTE - NSPATIENTFLAG_GEN_A_ER
no chest pain, no cough, and no shortness of breath.
Purple DH (Discharge Huddle; Vulnerable Patient)

## 2022-03-23 NOTE — ED PROVIDER NOTE - NSICDXPASTSURGICALHX_GEN_ALL_CORE_FT
PAST SURGICAL HISTORY:  H/O bilateral salpingo-oophorectomy     H/O thoracic aortic aneurysm repair     H/O total hysterectomy     H/O tubal ligation     H/O:  x2    History of cataract surgery     History of hip surgery     History of total abdominal hysterectomy

## 2022-03-23 NOTE — ED PROVIDER NOTE - NSICDXFAMILYHX_GEN_ALL_CORE_FT
FAMILY HISTORY:  Family history of pancreatic cancer  No pertinent family history in first degree relatives    Grandparent  Still living? No  Family history of aneurysm, Age at diagnosis: Age Unknown  Family history of stomach cancer, Age at diagnosis: Age Unknown

## 2022-03-23 NOTE — ED PROVIDER NOTE - NSICDXPASTMEDICALHX_GEN_ALL_CORE_FT
PAST MEDICAL HISTORY:  Atrial fibrillation     Atrial fibrillation     Bipolar depression     Cataract     Cerebrovascular accident (CVA)     Fibromyalgia     H/O aortic aneurysm and thoracic aortic aneurysm    Hip dislocation, left avascular necrosis    HLD (hyperlipidemia)     HTN (hypertension)     Hypothyroid     Spinal stenosis     Spinal stenosis of lumbar region     Stroke     Subacute systemic lupus erythematosus     Uterine cancer     Uterine cancer

## 2022-04-26 NOTE — ED ADULT NURSE NOTE - NS ED PATIENT SAFETY CONCERN
No
PAST SURGICAL HISTORY:  Colostomy care     H/O melanoma excision     S/P CABG (coronary artery bypass graft) ?    S/P implantation of automatic cardioverter/defibrillator (AICD)

## 2022-09-24 NOTE — ED PROCEDURE NOTE - NS ED PROCEDURE ASSISTED BY
Problem: Adjustment to Illness (Gastrointestinal Bleeding)  Goal: Optimal Coping with Acute Illness  Outcome: Ongoing, Progressing     Problem: Adult Inpatient Plan of Care  Goal: Absence of Hospital-Acquired Illness or Injury  Outcome: Ongoing, Progressing  Goal: Optimal Comfort and Wellbeing  Outcome: Ongoing, Progressing     Problem: Fluid Imbalance (Pneumonia)  Goal: Fluid Balance  Outcome: Ongoing, Progressing     Problem: Infection (Pneumonia)  Goal: Resolution of Infection Signs and Symptoms  Outcome: Ongoing, Progressing     Problem: Respiratory Compromise (Pneumonia)  Goal: Effective Oxygenation and Ventilation  Outcome: Ongoing, Progressing      Supervision was available

## 2022-12-23 NOTE — PHYSICAL THERAPY INITIAL EVALUATION ADULT - LEVEL OF INDEPENDENCE: SIT/STAND, REHAB EVAL
stand-by assist [facility] Pregnancy And Lactation Text: There is insufficient data to evaluate drug-associated risk for major birth defects, miscarriage, or other adverse maternal or fetal outcomes.  There is a pregnancy registry that monitors pregnancy outcomes in pregnant persons exposed to the medication during pregnancy.  It is unknown if this medication is excreted in breast milk.  Do not breastfeed during treatment and for about 4 weeks after the last dose.

## 2023-01-01 NOTE — H&P ADULT - NSHPLABSRESULTS_GEN_ALL_CORE
.  LABS:                         11.8   5.58  )-----------( 242      ( 20 Jul 2020 00:18 )             37.1     07-20    139  |  103  |  17  ----------------------------<  94  5.0   |  22  |  0.73    Ca    9.8      20 Jul 2020 00:18    TPro  7.2  /  Alb  4.2  /  TBili  0.3  /  DBili  x   /  AST  36  /  ALT  16  /  AlkPhos  90  07-20    PT/INR - ( 20 Jul 2020 00:18 )   PT: 14.3 sec;   INR: 1.21 ratio         PTT - ( 20 Jul 2020 00:18 )  PTT:37.9 sec          RADIOLOGY, EKG & ADDITIONAL TESTS: Reviewed.     ekg reviewed by me-- no acute st t changes [id number]

## 2023-02-02 NOTE — ED PROVIDER NOTE - NS ED MD EM SELECTION
PDMP reviewed; no aberrant behavior identified, prescription authorized.    Refills sent.     [id number] Comprehensive

## 2023-04-25 NOTE — PROGRESS NOTE ADULT - PROBLEM SELECTOR PROBLEM 4
Paroxysmal atrial fibrillation
No

## 2023-07-25 NOTE — PROVIDER CONTACT NOTE (OTHER) - ASSESSMENT
Scheduled Colonoscopy on 8.11.23 at [facility] [place]     Prep medications sent to pharmacy
Pt neurologically stable. no acute neurological change. vital signs stable. pt denies pain at this time.
Pt A&Ox4, VSS, lethargic (alert at baseline), diaphoretic and cool to touch, pupils dilated compared to earlier exam but remain brisk and reactive. L sided weakness. Neuro check stable otherwise.
Pt diaphoretic; lethargic   bp: 132/79  hr: 64  95%o2 RA  T: 97.4  B
pt stable, no hallucinations on shift

## 2024-05-31 NOTE — PROGRESS NOTE ADULT - PROBLEM SELECTOR PROBLEM 3
Problem: Safety - Adult  Goal: Free from fall injury  Outcome: Progressing     Problem: Skin/Tissue Integrity  Goal: Absence of new skin breakdown  Description: 1.  Monitor for areas of redness and/or skin breakdown  2.  Assess vascular access sites hourly  3.  Every 4-6 hours minimum:  Change oxygen saturation probe site  4.  Every 4-6 hours:  If on nasal continuous positive airway pressure, respiratory therapy assess nares and determine need for appliance change or resting period.  Outcome: Progressing     Problem: ABCDS Injury Assessment  Goal: Absence of physical injury  Outcome: Progressing      Prophylactic measure

## 2025-03-11 NOTE — PROGRESS NOTE ADULT - PROBLEM/PLAN-4
Patient had 9 beat run of [facility] according to [facility]. Physician notified.  Electronically signed by Gato Cardona RN on 9/14/2022 at 10:25 AM
Silvio was contacted by a population health pharmacist. Patient is being removed from the [employer]ose [employer]imization program candidate list.  After discussion regarding the program, patient does not wish to participate.     Heaven Cornelius, PharmD  Population [facility] Clinical Pharmacist  [phone]     
DISPLAY PLAN FREE TEXT

## 2025-05-16 NOTE — PROGRESS NOTE ADULT - PROBLEM SELECTOR PROBLEM 6
Vera Engle MD    
Other hyperlipidemia

## 2025-07-08 NOTE — BEHAVIORAL HEALTH ASSESSMENT NOTE - NS ED [facility] MED ROS ENT MOUTH
Medication passed protocol.     Medication:  passed protocol.   Last office visit date: 5/13/25  Next appointment scheduled?: Yes      No complaints